# Patient Record
Sex: FEMALE | Race: WHITE | Employment: OTHER | URBAN - METROPOLITAN AREA
[De-identification: names, ages, dates, MRNs, and addresses within clinical notes are randomized per-mention and may not be internally consistent; named-entity substitution may affect disease eponyms.]

---

## 2020-05-25 ENCOUNTER — APPOINTMENT (OUTPATIENT)
Dept: CT IMAGING | Age: 85
DRG: 481 | End: 2020-05-25
Payer: MEDICARE

## 2020-05-25 ENCOUNTER — APPOINTMENT (OUTPATIENT)
Dept: GENERAL RADIOLOGY | Age: 85
DRG: 481 | End: 2020-05-25
Payer: MEDICARE

## 2020-05-25 ENCOUNTER — HOSPITAL ENCOUNTER (INPATIENT)
Age: 85
LOS: 4 days | Discharge: NURSING, REHAB OR LONG-TERM CARE | DRG: 481 | End: 2020-05-29
Attending: ORTHOPAEDIC SURGERY | Admitting: ORTHOPAEDIC SURGERY
Payer: MEDICARE

## 2020-05-25 PROBLEM — S72.142A CLOSED INTERTROCHANTERIC FRACTURE OF HIP, LEFT, INITIAL ENCOUNTER (HCC): Status: ACTIVE | Noted: 2020-05-25

## 2020-05-25 LAB
ABO/RH: NORMAL
ALBUMIN SERPL-MCNC: 3.5 G/DL (ref 3.5–4.6)
ALP BLD-CCNC: 65 U/L (ref 40–130)
ALT SERPL-CCNC: 11 U/L (ref 0–33)
ANION GAP SERPL CALCULATED.3IONS-SCNC: 9 MEQ/L (ref 9–15)
ANTIBODY IDENTIFICATION: NORMAL
ANTIBODY SCREEN: NORMAL
APTT: 49.4 SEC (ref 24.4–36.8)
AST SERPL-CCNC: 22 U/L (ref 0–35)
BASOPHILS ABSOLUTE: 0.1 K/UL (ref 0–0.2)
BASOPHILS RELATIVE PERCENT: 1 %
BILIRUB SERPL-MCNC: 0.4 MG/DL (ref 0.2–0.7)
BUN BLDV-MCNC: 15 MG/DL (ref 8–23)
CALCIUM SERPL-MCNC: 8.7 MG/DL (ref 8.5–9.9)
CHLORIDE BLD-SCNC: 90 MEQ/L (ref 95–107)
CO2: 29 MEQ/L (ref 20–31)
CREAT SERPL-MCNC: 0.79 MG/DL (ref 0.5–0.9)
DAT IGG: NORMAL
DAT POLYSPECIFIC: NORMAL
EOSINOPHILS ABSOLUTE: 0.2 K/UL (ref 0–0.7)
EOSINOPHILS RELATIVE PERCENT: 1.9 %
GFR AFRICAN AMERICAN: >60
GFR NON-AFRICAN AMERICAN: >60
GLOBULIN: 2.5 G/DL (ref 2.3–3.5)
GLUCOSE BLD-MCNC: 126 MG/DL (ref 70–99)
HCT VFR BLD CALC: 32.9 % (ref 37–47)
HEMOGLOBIN: 11.3 G/DL (ref 12–16)
INR BLD: 3.1
LYMPHOCYTES ABSOLUTE: 1.1 K/UL (ref 1–4.8)
LYMPHOCYTES RELATIVE PERCENT: 11.9 %
MCH RBC QN AUTO: 27 PG (ref 27–31.3)
MCHC RBC AUTO-ENTMCNC: 34.3 % (ref 33–37)
MCV RBC AUTO: 78.8 FL (ref 82–100)
MONOCYTES ABSOLUTE: 0.8 K/UL (ref 0.2–0.8)
MONOCYTES RELATIVE PERCENT: 9.4 %
NEUTROPHILS ABSOLUTE: 6.7 K/UL (ref 1.4–6.5)
NEUTROPHILS RELATIVE PERCENT: 75.8 %
PDW BLD-RTO: 14.8 % (ref 11.5–14.5)
PLATELET # BLD: 162 K/UL (ref 130–400)
POTASSIUM SERPL-SCNC: 3.9 MEQ/L (ref 3.4–4.9)
PROTHROMBIN TIME: 31.5 SEC (ref 12.3–14.9)
RBC # BLD: 4.17 M/UL (ref 4.2–5.4)
SODIUM BLD-SCNC: 128 MEQ/L (ref 135–144)
TOTAL PROTEIN: 6 G/DL (ref 6.3–8)
WBC # BLD: 8.8 K/UL (ref 4.8–10.8)

## 2020-05-25 PROCEDURE — 6370000000 HC RX 637 (ALT 250 FOR IP): Performed by: PHYSICIAN ASSISTANT

## 2020-05-25 PROCEDURE — 85730 THROMBOPLASTIN TIME PARTIAL: CPT

## 2020-05-25 PROCEDURE — 96376 TX/PRO/DX INJ SAME DRUG ADON: CPT

## 2020-05-25 PROCEDURE — 6360000002 HC RX W HCPCS: Performed by: PHYSICIAN ASSISTANT

## 2020-05-25 PROCEDURE — 6370000000 HC RX 637 (ALT 250 FOR IP): Performed by: ORTHOPAEDIC SURGERY

## 2020-05-25 PROCEDURE — 96374 THER/PROPH/DIAG INJ IV PUSH: CPT

## 2020-05-25 PROCEDURE — P9016 RBC LEUKOCYTES REDUCED: HCPCS

## 2020-05-25 PROCEDURE — 2580000003 HC RX 258: Performed by: ORTHOPAEDIC SURGERY

## 2020-05-25 PROCEDURE — 99222 1ST HOSP IP/OBS MODERATE 55: CPT | Performed by: ORTHOPAEDIC SURGERY

## 2020-05-25 PROCEDURE — 6830039000 HC L3 TRAUMA ALERT

## 2020-05-25 PROCEDURE — 71045 X-RAY EXAM CHEST 1 VIEW: CPT

## 2020-05-25 PROCEDURE — 81001 URINALYSIS AUTO W/SCOPE: CPT

## 2020-05-25 PROCEDURE — 99285 EMERGENCY DEPT VISIT HI MDM: CPT

## 2020-05-25 PROCEDURE — 86901 BLOOD TYPING SEROLOGIC RH(D): CPT

## 2020-05-25 PROCEDURE — 2580000003 HC RX 258: Performed by: PHYSICIAN ASSISTANT

## 2020-05-25 PROCEDURE — 70450 CT HEAD/BRAIN W/O DYE: CPT

## 2020-05-25 PROCEDURE — 96375 TX/PRO/DX INJ NEW DRUG ADDON: CPT

## 2020-05-25 PROCEDURE — P9059 PLASMA, FRZ BETWEEN 8-24HOUR: HCPCS

## 2020-05-25 PROCEDURE — 80053 COMPREHEN METABOLIC PANEL: CPT

## 2020-05-25 PROCEDURE — 1210000000 HC MED SURG R&B

## 2020-05-25 PROCEDURE — 86850 RBC ANTIBODY SCREEN: CPT

## 2020-05-25 PROCEDURE — 93005 ELECTROCARDIOGRAM TRACING: CPT | Performed by: PHYSICIAN ASSISTANT

## 2020-05-25 PROCEDURE — 86880 COOMBS TEST DIRECT: CPT

## 2020-05-25 PROCEDURE — 85610 PROTHROMBIN TIME: CPT

## 2020-05-25 PROCEDURE — 36430 TRANSFUSION BLD/BLD COMPNT: CPT

## 2020-05-25 PROCEDURE — 86870 RBC ANTIBODY IDENTIFICATION: CPT

## 2020-05-25 PROCEDURE — 72125 CT NECK SPINE W/O DYE: CPT

## 2020-05-25 PROCEDURE — 86900 BLOOD TYPING SEROLOGIC ABO: CPT

## 2020-05-25 PROCEDURE — 36415 COLL VENOUS BLD VENIPUNCTURE: CPT

## 2020-05-25 PROCEDURE — 85025 COMPLETE CBC W/AUTO DIFF WBC: CPT

## 2020-05-25 PROCEDURE — 86922 COMPATIBILITY TEST ANTIGLOB: CPT

## 2020-05-25 PROCEDURE — 73552 X-RAY EXAM OF FEMUR 2/>: CPT

## 2020-05-25 PROCEDURE — 73502 X-RAY EXAM HIP UNI 2-3 VIEWS: CPT

## 2020-05-25 RX ORDER — ONDANSETRON 2 MG/ML
4 INJECTION INTRAMUSCULAR; INTRAVENOUS EVERY 6 HOURS PRN
Status: DISCONTINUED | OUTPATIENT
Start: 2020-05-25 | End: 2020-05-29 | Stop reason: HOSPADM

## 2020-05-25 RX ORDER — OXYCODONE HYDROCHLORIDE 5 MG/1
2.5 TABLET ORAL EVERY 4 HOURS PRN
Status: DISCONTINUED | OUTPATIENT
Start: 2020-05-25 | End: 2020-05-26

## 2020-05-25 RX ORDER — OXYCODONE HYDROCHLORIDE 5 MG/1
5 TABLET ORAL EVERY 4 HOURS PRN
Status: DISCONTINUED | OUTPATIENT
Start: 2020-05-25 | End: 2020-05-25

## 2020-05-25 RX ORDER — FENTANYL CITRATE 50 UG/ML
25 INJECTION, SOLUTION INTRAMUSCULAR; INTRAVENOUS ONCE
Status: COMPLETED | OUTPATIENT
Start: 2020-05-25 | End: 2020-05-25

## 2020-05-25 RX ORDER — ACETAMINOPHEN 500 MG
1000 TABLET ORAL ONCE
Status: COMPLETED | OUTPATIENT
Start: 2020-05-25 | End: 2020-05-25

## 2020-05-25 RX ORDER — SODIUM CHLORIDE 0.9 % (FLUSH) 0.9 %
10 SYRINGE (ML) INJECTION EVERY 12 HOURS SCHEDULED
Status: DISCONTINUED | OUTPATIENT
Start: 2020-05-25 | End: 2020-05-27 | Stop reason: HOSPADM

## 2020-05-25 RX ORDER — MORPHINE SULFATE 2 MG/ML
2 INJECTION, SOLUTION INTRAMUSCULAR; INTRAVENOUS
Status: DISCONTINUED | OUTPATIENT
Start: 2020-05-25 | End: 2020-05-26

## 2020-05-25 RX ORDER — PROMETHAZINE HYDROCHLORIDE 12.5 MG/1
12.5 TABLET ORAL EVERY 6 HOURS PRN
Status: DISCONTINUED | OUTPATIENT
Start: 2020-05-25 | End: 2020-05-29 | Stop reason: HOSPADM

## 2020-05-25 RX ORDER — PHYTONADIONE 5 MG/1
10 TABLET ORAL ONCE
Status: COMPLETED | OUTPATIENT
Start: 2020-05-25 | End: 2020-05-25

## 2020-05-25 RX ORDER — FENTANYL CITRATE 50 UG/ML
12.5 INJECTION, SOLUTION INTRAMUSCULAR; INTRAVENOUS ONCE
Status: COMPLETED | OUTPATIENT
Start: 2020-05-25 | End: 2020-05-25

## 2020-05-25 RX ORDER — ONDANSETRON 2 MG/ML
4 INJECTION INTRAMUSCULAR; INTRAVENOUS ONCE
Status: COMPLETED | OUTPATIENT
Start: 2020-05-25 | End: 2020-05-25

## 2020-05-25 RX ORDER — SODIUM CHLORIDE, SODIUM LACTATE, POTASSIUM CHLORIDE, CALCIUM CHLORIDE 600; 310; 30; 20 MG/100ML; MG/100ML; MG/100ML; MG/100ML
INJECTION, SOLUTION INTRAVENOUS CONTINUOUS
Status: DISPENSED | OUTPATIENT
Start: 2020-05-25 | End: 2020-05-26

## 2020-05-25 RX ORDER — SODIUM CHLORIDE 9 MG/ML
INJECTION, SOLUTION INTRAVENOUS CONTINUOUS
Status: DISCONTINUED | OUTPATIENT
Start: 2020-05-25 | End: 2020-05-25

## 2020-05-25 RX ORDER — OXYCODONE HYDROCHLORIDE 5 MG/1
5 TABLET ORAL EVERY 4 HOURS PRN
Status: DISCONTINUED | OUTPATIENT
Start: 2020-05-25 | End: 2020-05-26

## 2020-05-25 RX ORDER — CEFAZOLIN SODIUM 2 G/50ML
2 SOLUTION INTRAVENOUS
Status: DISCONTINUED | OUTPATIENT
Start: 2020-05-25 | End: 2020-05-26

## 2020-05-25 RX ORDER — SODIUM CHLORIDE 0.9 % (FLUSH) 0.9 %
10 SYRINGE (ML) INJECTION PRN
Status: DISCONTINUED | OUTPATIENT
Start: 2020-05-25 | End: 2020-05-27 | Stop reason: HOSPADM

## 2020-05-25 RX ADMIN — SODIUM CHLORIDE: 9 INJECTION, SOLUTION INTRAVENOUS at 16:10

## 2020-05-25 RX ADMIN — FENTANYL CITRATE 12.5 MCG: 50 INJECTION, SOLUTION INTRAMUSCULAR; INTRAVENOUS at 17:51

## 2020-05-25 RX ADMIN — Medication 10 ML: at 22:23

## 2020-05-25 RX ADMIN — SODIUM CHLORIDE, POTASSIUM CHLORIDE, SODIUM LACTATE AND CALCIUM CHLORIDE: 600; 310; 30; 20 INJECTION, SOLUTION INTRAVENOUS at 22:22

## 2020-05-25 RX ADMIN — ONDANSETRON 4 MG: 2 INJECTION INTRAMUSCULAR; INTRAVENOUS at 16:11

## 2020-05-25 RX ADMIN — PHYTONADIONE 10 MG: 5 TABLET ORAL at 17:51

## 2020-05-25 RX ADMIN — FENTANYL CITRATE 25 MCG: 50 INJECTION, SOLUTION INTRAMUSCULAR; INTRAVENOUS at 16:10

## 2020-05-25 RX ADMIN — ACETAMINOPHEN 1000 MG: 500 TABLET ORAL at 22:15

## 2020-05-25 ASSESSMENT — ENCOUNTER SYMPTOMS
TROUBLE SWALLOWING: 0
COLOR CHANGE: 0
ABDOMINAL PAIN: 0
APNEA: 0
ALLERGIC/IMMUNOLOGIC NEGATIVE: 1
EYE PAIN: 0
SHORTNESS OF BREATH: 0

## 2020-05-25 ASSESSMENT — PAIN DESCRIPTION - FREQUENCY: FREQUENCY: CONTINUOUS

## 2020-05-25 ASSESSMENT — PAIN SCALES - GENERAL
PAINLEVEL_OUTOF10: 6
PAINLEVEL_OUTOF10: 8
PAINLEVEL_OUTOF10: 8
PAINLEVEL_OUTOF10: 0

## 2020-05-25 ASSESSMENT — PAIN DESCRIPTION - ORIENTATION: ORIENTATION: LEFT

## 2020-05-25 ASSESSMENT — PAIN DESCRIPTION - LOCATION: LOCATION: HIP

## 2020-05-25 ASSESSMENT — PAIN - FUNCTIONAL ASSESSMENT: PAIN_FUNCTIONAL_ASSESSMENT: PREVENTS OR INTERFERES SOME ACTIVE ACTIVITIES AND ADLS

## 2020-05-25 ASSESSMENT — PAIN DESCRIPTION - PAIN TYPE: TYPE: ACUTE PAIN

## 2020-05-25 ASSESSMENT — PAIN DESCRIPTION - ONSET: ONSET: SUDDEN

## 2020-05-25 NOTE — FLOWSHEET NOTE
1850-Patient arrived to floor, no pain at this time unless she moves. Patient is AOx4. Patients cardiologist is Dr. Yamini Gonsalez.

## 2020-05-25 NOTE — H&P
Patient: Heidi Bautista  Unit/Bed:W291/W291-01  YOB: 1935  MRN: [id number]  Acct: [de-identified]   Admitting Diagnosis: Closed intertrochanteric fracture of hip, left, initial encounter [facility]) Aron Bright  Admit Date:  5/25/2020  Hospital Day: 0      Chief Complaint:     Left hip pain    History of Present Illness:    Patient s/p fall, and presented to ED per EMS. She apparently fell over her nebulizer tubing. She apparently hit the back of her head, but did not have LOC. She also denies headache, neck or head pain. Patient of significance is on coumadin.     Allergies   Allergen Reactions    Lipitor [Atorvastatin Calcium]      Leg cramping       Current Facility-Administered Medications   Medication Dose Route Frequency Provider Last Rate Last Dose    0.9 % sodium chloride infusion   Intravenous Continuous Nigel Perez PA-C 100 mL/hr at 05/25/20 1610      lactated ringers infusion   Intravenous Continuous Fernando Roy MD        sodium chloride flush 0.9 % injection 10 mL  10 mL Intravenous 2 times per day Mica Wood MD        sodium chloride flush 0.9 % injection 10 mL  10 mL Intravenous PRN Fernando Sanchez MD        ceFAZolin (ANCEF) 2 g in dextrose 3 % 50 mL IVPB (duplex)  2 g Intravenous On Call to 29 Rea Ozuna MD        acetaminophen (TYLENOL) tablet 1,000 mg  1,000 mg Oral Once Mica Wood MD        oxyCODONE (ROXICODONE) immediate release tablet 5 mg  5 mg Oral Q4H PRN Fernando Sanchez MD        morphine (PF) injection 2 mg  2 mg Intravenous Q2H PRN Mica Wood MD        magnesium hydroxide (MILK OF MAGNESIA) 400 MG/5ML suspension 30 mL  30 mL Oral Daily PRN Mica Wood MD        promethazine (PHENERGAN) tablet 12.5 mg  12.5 mg Oral Q6H PRN Fernando Roy MD        Or    ondansetron (ZOFRAN) injection 4 mg  4 mg Intravenous Q6H PRN Mica Wood MD           PMHx:  Past Medical History:   Diagnosis Date  COPD (chronic obstructive pulmonary disease) (HCC)     Hypertension     Lupus (HCC)        PSHx:  Past Surgical History:   Procedure Laterality Date    APPENDECTOMY      CARDIAC PACEMAKER PLACEMENT      CHOLECYSTECTOMY      ELBOW SURGERY Left        Social Hx:  Social History     Socioeconomic History    Marital status:      Spouse name: None    Number of children: None    Years of education: None    Highest education level: None   Occupational History    None   Social Needs    Financial resource strain: None    Food insecurity     Worry: None     Inability: None    Transportation needs     Medical: None     Non-medical: None   Tobacco Use    Smoking status: Former Smoker     Types: Cigarettes    Smokeless tobacco: Never Used   Substance and Sexual Activity    Alcohol use: Not Currently    Drug use: Never    Sexual activity: None   Lifestyle    Physical activity     Days per week: None     Minutes per session: None    Stress: None   Relationships    Social connections     Talks on phone: None     Gets together: None     Attends [employer] service: None     Active member of club or organization: None     Attends meetings of clubs or organizations: None     Relationship status: None    Intimate partner violence     Fear of current or ex partner: None     Emotionally abused: None     Physically abused: None     Forced sexual activity: None   Other Topics Concern    None   Social History Narrative    None       Family Hx:  History reviewed. No pertinent family history. Review ofSystems:   Review of Systems       Physical Examination:    /61   Pulse 67   Temp 98 °F (36.7 °C) (Oral)   Resp 18   Ht 5' 5\" (1.651 m)   Wt 160 lb (72.6 kg)   LMP  (LMP Unknown)   SpO2 95%   BMI 26.63 kg/m²    Physical Exam     Examination demonstrates shortened left lower extremity. A little external rotation.   She has no skin problems no ecchymosis no significant bruising no asymmetrical LMP  (LMP Unknown)   SpO2 100%   BMI 26.63 kg/m²   CONSTITUTIONAL: Alert, oriented x3, no acute distress  EYES: Extraocular movements intact, no nystagmus  NECK: No jugular venous distention, no cervical lymphadenopathy  BACK: Nontender to palpation along lumbar spinous processes. LUNGS: Diffuse crackles, no wheezing to auscultation. Respiratory effort symmetrical.  CARDIOVASCULAR: Normal S1 and S2, irregularly irregular rate and rhythm  ABDOMEN: Abdomen soft, nontender, nondistended with normal bowel sounds  MUSCULOSKELETAL:    Bilateral upper extremities and right lower extremity with no palpable step-off or deformity. Left lower extremity with no gross shortening. No visible contusion. Left thigh swollen but supple. Left EHL, plantar flexion, dorsiflexion, knee flexion, knee extension are 4+/5. Wiggles toes. No spasticity or clonus. DP, femoral, popliteal pulses 2+ with capillary refill less than 2 seconds. NEUROLOGIC: Sensory intact light touch in deep peroneal, superficial peroneal, saphenous, tibial, sural nerve distributions. Patellar tendon and ankle reflexes 1+ and symmetrical.  SKIN: No visible contusion or rash.     DATA:  CBC:   Lab Results   Component Value Date    WBC 8.8 05/25/2020    RBC 4.17 05/25/2020    RBC 3.78 11/07/2011    HGB 11.3 05/25/2020    HCT 32.9 05/25/2020    MCV 78.8 05/25/2020    MCH 27.0 05/25/2020    MCHC 34.3 05/25/2020    RDW 14.8 05/25/2020     05/25/2020    MPV 9.0 05/05/2013     BMP:    Lab Results   Component Value Date     05/25/2020    K 3.9 05/25/2020    CL 90 05/25/2020    CO2 29 05/25/2020    BUN 15 05/25/2020    LABALBU 3.5 05/25/2020    LABALBU 3.6 11/06/2011    CREATININE 0.79 05/25/2020    CALCIUM 8.7 05/25/2020    GFRAA >60.0 05/25/2020    LABGLOM >60.0 05/25/2020    GLUCOSE 126 05/25/2020    GLUCOSE 105 11/07/2011     INR 1.5 this morning    Radiographs of the pelvis and left femur with unstable intertrochanteric fracture,  dissociation of posterior medial buttress. Left hip joint space concentric. ASSESSMENT AND PLAN:    -Acute left intertrochanteric femur fracture: Findings and options discussed with the patient. At this point demonstrates some element of fluid overload as well as atrial fibrillation. INR is 1.5 after reversal.  Awaiting cardiology input and optimization. Recommendation at this point is to defer surgery until clearance and optimization of been obtained. Discussed the risk, benefits, alternatives, convalescence for indirect versus open reduction and intramedullary nail fixation of left intertrochanteric femur fracture. Risk discussed included, but were not limited to, infection, DVT, pulmonary embolism, hematoma formation, wound healing complications, intraoperative and postoperative fracture, malunion or nonunion at the fracture site, need for further surgery, hardware related complications including trochanteric bursitis and cut out through the femoral head, acute and/or chronic postoperative pain, and death. Patient verbalized understanding and agrees to proceed. Plan for surgery tomorrow morning by my colleague, Dr. Adithya Rubalcava, to allow for appropriate medical optimization.     Jose Dawn MD  Orthopaedic Surgery

## 2020-05-25 NOTE — CARE COORDINATION
[facility] AT [place] Case Management Initial Discharge Assessment    Met with Patient to discuss discharge plan. PCP: Ronel Roach, DO                                Date of Last Visit: 3/23/20    If no PCP, list provided? N/A she states that her Dr retired and she will be getting a new Dr.    Sharon Sue: independently at home    Who do you live with? Daughter, son in law, grandson    Who helps you with your care:  self    If lives at home:     Do you have any barriers navigating in your home? no    Patient can perform ADL? Yes    Current Services (outpatient and in home) :  None    Dialysis: No    Is transportation available to get to your appointments? Yes    DME Equipment:  no    Respiratory equipment: PRN/HS Oxygen 2.5 Liters    Respiratory provider:  yes - [address]:  yes - [facility]    Consult with Medication Assistance Program?  No        Does Patient Have a High-Risk for Readmission Diagnosis (CHF, PN, MI, COPD)? Yes h/o copd on prn home o2. Initial Discharge Plan? (Note: please see concurrent daily documentation for any updates after initial note). CM to assess for further d/c needs and referrals. Daughter Kary Brennan is very involved in pt's care.       Electronically signed by Jaydon Viveros on 5/25/2020 at 6:15 PM

## 2020-05-26 ENCOUNTER — APPOINTMENT (OUTPATIENT)
Dept: GENERAL RADIOLOGY | Age: 85
DRG: 481 | End: 2020-05-26
Payer: MEDICARE

## 2020-05-26 ENCOUNTER — ANESTHESIA EVENT (OUTPATIENT)
Dept: OPERATING ROOM | Age: 85
DRG: 481 | End: 2020-05-26
Payer: MEDICARE

## 2020-05-26 LAB
ANION GAP SERPL CALCULATED.3IONS-SCNC: 8 MEQ/L (ref 9–15)
BACTERIA: NEGATIVE /HPF
BILIRUBIN URINE: NEGATIVE
BLOOD BANK DISPENSE STATUS: NORMAL
BLOOD BANK DISPENSE STATUS: NORMAL
BLOOD BANK PRODUCT CODE: NORMAL
BLOOD BANK PRODUCT CODE: NORMAL
BLOOD, URINE: NEGATIVE
BPU ID: NORMAL
BPU ID: NORMAL
BUN BLDV-MCNC: 10 MG/DL (ref 8–23)
CALCIUM SERPL-MCNC: 8.9 MG/DL (ref 8.5–9.9)
CHLORIDE BLD-SCNC: 92 MEQ/L (ref 95–107)
CLARITY: CLEAR
CO2: 30 MEQ/L (ref 20–31)
COLOR: YELLOW
CREAT SERPL-MCNC: 0.59 MG/DL (ref 0.5–0.9)
DESCRIPTION BLOOD BANK: NORMAL
DESCRIPTION BLOOD BANK: NORMAL
EKG ATRIAL RATE: 66 BPM
EKG ATRIAL RATE: 67 BPM
EKG P AXIS: 76 DEGREES
EKG P AXIS: 79 DEGREES
EKG P-R INTERVAL: 170 MS
EKG P-R INTERVAL: 188 MS
EKG Q-T INTERVAL: 464 MS
EKG Q-T INTERVAL: 508 MS
EKG QRS DURATION: 146 MS
EKG QRS DURATION: 176 MS
EKG QTC CALCULATION (BAZETT): 490 MS
EKG QTC CALCULATION (BAZETT): 532 MS
EKG R AXIS: 77 DEGREES
EKG R AXIS: 80 DEGREES
EKG T AXIS: -64 DEGREES
EKG T AXIS: -74 DEGREES
EKG VENTRICULAR RATE: 66 BPM
EKG VENTRICULAR RATE: 67 BPM
EPITHELIAL CELLS, UA: ABNORMAL /HPF (ref 0–5)
FERRITIN: 202.8 NG/ML (ref 13–150)
GFR AFRICAN AMERICAN: >60
GFR NON-AFRICAN AMERICAN: >60
GLUCOSE BLD-MCNC: 103 MG/DL (ref 70–99)
GLUCOSE URINE: NEGATIVE MG/DL
HYALINE CASTS: ABNORMAL /HPF (ref 0–5)
INR BLD: 1.5
IRON SATURATION: 14 % (ref 11–46)
IRON: 33 UG/DL (ref 37–145)
KETONES, URINE: NEGATIVE MG/DL
LEUKOCYTE ESTERASE, URINE: ABNORMAL
LV EF: 58 %
LVEF MODALITY: NORMAL
NITRITE, URINE: NEGATIVE
PH UA: 7 (ref 5–9)
POTASSIUM SERPL-SCNC: 4.1 MEQ/L (ref 3.4–4.9)
PROTEIN UA: NEGATIVE MG/DL
PROTHROMBIN TIME: 18.3 SEC (ref 12.3–14.9)
RBC UA: ABNORMAL /HPF (ref 0–5)
SARS-COV-2, NAAT: NOT DETECTED
SODIUM BLD-SCNC: 130 MEQ/L (ref 135–144)
SPECIFIC GRAVITY UA: 1.01 (ref 1–1.03)
TOTAL IRON BINDING CAPACITY: 236 UG/DL (ref 178–450)
URINE REFLEX TO CULTURE: ABNORMAL
UROBILINOGEN, URINE: 1 E.U./DL
WBC UA: ABNORMAL /HPF (ref 0–5)

## 2020-05-26 PROCEDURE — 2580000003 HC RX 258: Performed by: ORTHOPAEDIC SURGERY

## 2020-05-26 PROCEDURE — 85610 PROTHROMBIN TIME: CPT

## 2020-05-26 PROCEDURE — 83540 ASSAY OF IRON: CPT

## 2020-05-26 PROCEDURE — 6370000000 HC RX 637 (ALT 250 FOR IP): Performed by: NURSE PRACTITIONER

## 2020-05-26 PROCEDURE — 93010 ELECTROCARDIOGRAM REPORT: CPT | Performed by: INTERNAL MEDICINE

## 2020-05-26 PROCEDURE — 94761 N-INVAS EAR/PLS OXIMETRY MLT: CPT

## 2020-05-26 PROCEDURE — U0002 COVID-19 LAB TEST NON-CDC: HCPCS

## 2020-05-26 PROCEDURE — 6370000000 HC RX 637 (ALT 250 FOR IP): Performed by: ORTHOPAEDIC SURGERY

## 2020-05-26 PROCEDURE — 71045 X-RAY EXAM CHEST 1 VIEW: CPT

## 2020-05-26 PROCEDURE — 99223 1ST HOSP IP/OBS HIGH 75: CPT | Performed by: INTERNAL MEDICINE

## 2020-05-26 PROCEDURE — 94640 AIRWAY INHALATION TREATMENT: CPT

## 2020-05-26 PROCEDURE — 82728 ASSAY OF FERRITIN: CPT

## 2020-05-26 PROCEDURE — 93306 TTE W/DOPPLER COMPLETE: CPT

## 2020-05-26 PROCEDURE — 83550 IRON BINDING TEST: CPT

## 2020-05-26 PROCEDURE — 80048 BASIC METABOLIC PNL TOTAL CA: CPT

## 2020-05-26 PROCEDURE — 1210000000 HC MED SURG R&B

## 2020-05-26 PROCEDURE — 6370000000 HC RX 637 (ALT 250 FOR IP): Performed by: INTERNAL MEDICINE

## 2020-05-26 PROCEDURE — 36415 COLL VENOUS BLD VENIPUNCTURE: CPT

## 2020-05-26 RX ORDER — LISINOPRIL 20 MG/1
20 TABLET ORAL NIGHTLY
Status: DISCONTINUED | OUTPATIENT
Start: 2020-05-26 | End: 2020-05-29 | Stop reason: HOSPADM

## 2020-05-26 RX ORDER — MORPHINE SULFATE 2 MG/ML
1 INJECTION, SOLUTION INTRAMUSCULAR; INTRAVENOUS
Status: DISCONTINUED | OUTPATIENT
Start: 2020-05-26 | End: 2020-05-29 | Stop reason: HOSPADM

## 2020-05-26 RX ORDER — WARFARIN SODIUM 4 MG/1
4 TABLET ORAL EVERY OTHER DAY
Status: ON HOLD | COMMUNITY
End: 2020-06-10 | Stop reason: HOSPADM

## 2020-05-26 RX ORDER — MONTELUKAST SODIUM 10 MG/1
10 TABLET ORAL NIGHTLY
Status: DISCONTINUED | OUTPATIENT
Start: 2020-05-26 | End: 2020-05-29 | Stop reason: HOSPADM

## 2020-05-26 RX ORDER — IPRATROPIUM BROMIDE AND ALBUTEROL SULFATE 2.5; .5 MG/3ML; MG/3ML
1 SOLUTION RESPIRATORY (INHALATION) 4 TIMES DAILY PRN
Status: DISCONTINUED | OUTPATIENT
Start: 2020-05-26 | End: 2020-05-26

## 2020-05-26 RX ORDER — POTASSIUM CHLORIDE 20 MEQ/1
20 TABLET, EXTENDED RELEASE ORAL 2 TIMES DAILY
COMMUNITY

## 2020-05-26 RX ORDER — MAGNESIUM OXIDE 400 MG/1
400 TABLET ORAL DAILY
Status: DISCONTINUED | OUTPATIENT
Start: 2020-05-27 | End: 2020-05-26 | Stop reason: RX

## 2020-05-26 RX ORDER — HYDROCODONE BITARTRATE AND ACETAMINOPHEN 5; 325 MG/1; MG/1
0.5 TABLET ORAL EVERY 4 HOURS PRN
Status: DISCONTINUED | OUTPATIENT
Start: 2020-05-26 | End: 2020-05-29 | Stop reason: HOSPADM

## 2020-05-26 RX ORDER — MAGNESIUM OXIDE 400 MG/1
400 TABLET ORAL DAILY
COMMUNITY

## 2020-05-26 RX ORDER — HYDROXYCHLOROQUINE SULFATE 200 MG/1
100 TABLET, FILM COATED ORAL 2 TIMES DAILY
COMMUNITY
End: 2021-03-18

## 2020-05-26 RX ORDER — WARFARIN SODIUM 3 MG/1
3 TABLET ORAL EVERY OTHER DAY
Status: ON HOLD | COMMUNITY
End: 2020-06-10 | Stop reason: HOSPADM

## 2020-05-26 RX ORDER — TRANEXAMIC ACID 650 1/1
1300 TABLET ORAL ONCE
Status: COMPLETED | OUTPATIENT
Start: 2020-05-27 | End: 2020-05-27

## 2020-05-26 RX ORDER — ASPIRIN 81 MG/1
81 TABLET, CHEWABLE ORAL DAILY
COMMUNITY

## 2020-05-26 RX ORDER — SOTALOL HYDROCHLORIDE 120 MG/1
120 TABLET ORAL 2 TIMES DAILY
Status: DISCONTINUED | OUTPATIENT
Start: 2020-05-26 | End: 2020-05-29 | Stop reason: HOSPADM

## 2020-05-26 RX ORDER — ACETAMINOPHEN 500 MG
1000 TABLET ORAL DAILY
Status: DISCONTINUED | OUTPATIENT
Start: 2020-05-27 | End: 2020-05-29 | Stop reason: HOSPADM

## 2020-05-26 RX ORDER — CEFAZOLIN SODIUM 1 G/50ML
2 INJECTION, SOLUTION INTRAVENOUS
Status: DISCONTINUED | OUTPATIENT
Start: 2020-05-26 | End: 2020-05-26 | Stop reason: CLARIF

## 2020-05-26 RX ORDER — SOTALOL HYDROCHLORIDE 120 MG/1
120 TABLET ORAL 2 TIMES DAILY
COMMUNITY

## 2020-05-26 RX ORDER — HYDROCHLOROTHIAZIDE 12.5 MG/1
12.5 CAPSULE, GELATIN COATED ORAL DAILY
COMMUNITY

## 2020-05-26 RX ORDER — CEFAZOLIN SODIUM 2 G/50ML
2 SOLUTION INTRAVENOUS
Status: DISPENSED | OUTPATIENT
Start: 2020-05-26 | End: 2020-05-26

## 2020-05-26 RX ORDER — AMOXICILLIN 250 MG
2 CAPSULE ORAL 2 TIMES DAILY
Status: DISCONTINUED | OUTPATIENT
Start: 2020-05-26 | End: 2020-05-29 | Stop reason: HOSPADM

## 2020-05-26 RX ORDER — PANTOPRAZOLE SODIUM 40 MG/1
40 TABLET, DELAYED RELEASE ORAL
Status: DISCONTINUED | OUTPATIENT
Start: 2020-05-27 | End: 2020-05-29 | Stop reason: HOSPADM

## 2020-05-26 RX ORDER — METHOCARBAMOL 500 MG/1
500 TABLET, FILM COATED ORAL 4 TIMES DAILY PRN
Status: DISCONTINUED | OUTPATIENT
Start: 2020-05-26 | End: 2020-05-29 | Stop reason: HOSPADM

## 2020-05-26 RX ORDER — HYDROXYCHLOROQUINE SULFATE 200 MG/1
100 TABLET, FILM COATED ORAL 2 TIMES DAILY
Status: DISCONTINUED | OUTPATIENT
Start: 2020-05-26 | End: 2020-05-29 | Stop reason: HOSPADM

## 2020-05-26 RX ORDER — LANOLIN ALCOHOL/MO/W.PET/CERES
400 CREAM (GRAM) TOPICAL DAILY
Status: DISCONTINUED | OUTPATIENT
Start: 2020-05-27 | End: 2020-05-29 | Stop reason: HOSPADM

## 2020-05-26 RX ORDER — ALBUTEROL SULFATE 2.5 MG/3ML
2.5 SOLUTION RESPIRATORY (INHALATION) EVERY 6 HOURS PRN
COMMUNITY

## 2020-05-26 RX ORDER — HYDROCODONE BITARTRATE AND ACETAMINOPHEN 10; 325 MG/1; MG/1
1 TABLET ORAL EVERY 4 HOURS PRN
Status: DISCONTINUED | OUTPATIENT
Start: 2020-05-26 | End: 2020-05-29 | Stop reason: HOSPADM

## 2020-05-26 RX ORDER — FUROSEMIDE 20 MG/1
40 TABLET ORAL DAILY
Status: ON HOLD | COMMUNITY
End: 2020-06-10 | Stop reason: HOSPADM

## 2020-05-26 RX ORDER — LISINOPRIL 20 MG/1
20 TABLET ORAL NIGHTLY
Status: ON HOLD | COMMUNITY
End: 2020-06-10 | Stop reason: HOSPADM

## 2020-05-26 RX ORDER — ACETAMINOPHEN 80 MG
TABLET,CHEWABLE ORAL ONCE
Status: COMPLETED | OUTPATIENT
Start: 2020-05-26 | End: 2020-05-26

## 2020-05-26 RX ORDER — HYDROCODONE BITARTRATE AND ACETAMINOPHEN 5; 325 MG/1; MG/1
1 TABLET ORAL EVERY 4 HOURS PRN
Status: DISCONTINUED | OUTPATIENT
Start: 2020-05-26 | End: 2020-05-29 | Stop reason: HOSPADM

## 2020-05-26 RX ADMIN — MONTELUKAST SODIUM 10 MG: 10 TABLET, FILM COATED ORAL at 20:25

## 2020-05-26 RX ADMIN — DOCUSATE SODIUM 50MG AND SENNOSIDES 8.6MG 2 TABLET: 8.6; 5 TABLET, FILM COATED ORAL at 20:25

## 2020-05-26 RX ADMIN — METHOCARBAMOL TABLETS 500 MG: 500 TABLET, COATED ORAL at 12:20

## 2020-05-26 RX ADMIN — Medication 10 ML: at 20:29

## 2020-05-26 RX ADMIN — SOTALOL HYDROCHLORIDE 120 MG: 120 TABLET ORAL at 20:24

## 2020-05-26 RX ADMIN — TIOTROPIUM BROMIDE INHALATION SPRAY 2 PUFF: 3.12 SPRAY, METERED RESPIRATORY (INHALATION) at 17:32

## 2020-05-26 RX ADMIN — HYDROXYCHLOROQUINE SULFATE 100 MG: 200 TABLET, FILM COATED ORAL at 15:35

## 2020-05-26 RX ADMIN — HYDROXYCHLOROQUINE SULFATE 100 MG: 200 TABLET, FILM COATED ORAL at 23:55

## 2020-05-26 RX ADMIN — LISINOPRIL 20 MG: 20 TABLET ORAL at 23:55

## 2020-05-26 RX ADMIN — IPRATROPIUM BROMIDE AND ALBUTEROL SULFATE 1 AMPULE: .5; 3 SOLUTION RESPIRATORY (INHALATION) at 09:58

## 2020-05-26 RX ADMIN — SOTALOL HYDROCHLORIDE 120 MG: 120 TABLET ORAL at 12:20

## 2020-05-26 RX ADMIN — Medication: at 12:20

## 2020-05-26 ASSESSMENT — ENCOUNTER SYMPTOMS
GASTROINTESTINAL NEGATIVE: 1
SHORTNESS OF BREATH: 1
COUGH: 0
CHEST TIGHTNESS: 0
EYES NEGATIVE: 1

## 2020-05-26 ASSESSMENT — PAIN SCALES - GENERAL: PAINLEVEL_OUTOF10: 0

## 2020-05-26 NOTE — CONSULTS
Hospitalist Group   History and Physical      CHIEF COMPLAINT: Fall    History of Present Illness:  81 y.o. female with a history of lupus, COPD on O2 as needed, GONZALEZ on CPAP at night, hypertension, A. fib on Coumadin status post pacemaker, presents to the ER from home after a fall. Patient was using her nebulizer for COPD when she got up she tripped on the tube and fell hitting the back of her head and her left hip. She denied losing consciousness. Patient was only complaining of left hip pain. Patient is active at baseline. She takes care of herself and she usually walks with no assistance. Patient is completely independent. She currently denies dizziness, chest pain, shortness of breath, nausea, vomiting, abdominal pain, urinary symptoms. She said that she has history of A. fib and has pacemaker for the past 20 years. She is on Coumadin and her INR is 3.1. She denies bleeding from anywhere. Patient uses oxygen when needed and usually sleeps with CPAP. She denies any sick exposure. REVIEW OF SYSTEMS:  no fevers, chills, cp, sob, n/v, ha, vision/hearing changes, wt changes, hot/cold flashes, other open skin lesions, diarrhea, constipation, dysuria/hematuria unless noted in HPI. Complete ROS performed with the patient and is otherwise negative. PMH:  Past Medical History:   Diagnosis Date    COPD [facility]chronic obstructive pulmonary disease) ([facility] [address]     Hypertension     Lupus ([facility] [address]        Surgical History:  Past Surgical History:   Procedure Laterality Date    APPENDECTOMY      CARDIAC PACEMAKER PLACEMENT      CHOLECYSTECTOMY      ELBOW SURGERY Left        Medications Prior to Admission:    Prior to Admission medications    Not on File       Allergies:    Lipitor [atorvastatin calcium]    Social History:    reports that she has quit smoking. Her smoking use included cigarettes. She has never used smokeless tobacco. She reports previous alcohol use.  She reports that she does not use drugs. Family History:   History reviewed. No pertinent family history. PHYSICAL EXAM:  Vitals:  /61   Pulse 67   Temp 98 °F (36.7 °C) (Oral)   Resp 18   Ht 5' 5\" (1.651 m)   Wt 160 lb (72.6 kg)   LMP  (LMP Unknown)   SpO2 95%   BMI 26.63 kg/m²   General Appearance: alert and oriented to person, place and time, well developed and well- nourished, in no acute distress  Skin: warm and dry, no rash or erythema  Head: normocephalic and atraumatic  Eyes: pupils equal, round, and reactive to light, extraocular eye movements intact, conjunctivae normal  ENT: tympanic membrane, external ear and ear canal normal bilaterally, nose without deformity, nasal mucosa and turbinates normal without polyps  Neck: supple and non-tender without mass, no thyromegaly or thyroid nodules, no cervical lymphadenopathy  Pulmonary/Chest: clear to auscultation bilaterally- no wheezes, rales or rhonchi, normal air movement, no respiratory distress  Cardiovascular: normal rate, regular rhythm, normal S1 and S2, no murmurs, rubs, clicks, or gallops, distal pulses intact, no carotid bruits  Abdomen: soft, non-tender, non-distended, normal bowel sounds, no masses or organomegaly  Extremities: no cyanosis, clubbing or edema  Musculoskeletal: Tenderness over the left hip, restricted range of motion of the left lower extremity, externally rotated left lower extremity  Neurologic: reflexes normal and symmetric, no cranial nerve deficit, coordination and speech normal      LABS:  Recent Labs     05/25/20  1615   *   K 3.9   CL 90*   CO2 29   BUN 15   CREATININE 0.79   GLUCOSE 126*   CALCIUM 8.7       Recent Labs     05/25/20  1615   WBC 8.8   RBC 4.17*   HGB 11.3*   HCT 32.9*   MCV 78.8*   MCH 27.0   MCHC 34.3   RDW 14.8*          No results for input(s): POCGLU in the last 72 hours.     CBC with Differential:    Lab Results   Component Value Date    WBC 8.8 05/25/2020    RBC 4.17 05/25/2020    RBC 3.78 11/07/2011    HGB 11.3 05/25/2020    HCT 32.9 05/25/2020     05/25/2020    MCV 78.8 05/25/2020    MCH 27.0 05/25/2020    MCHC 34.3 05/25/2020    RDW 14.8 05/25/2020    LYMPHOPCT 11.9 05/25/2020    MONOPCT 9.4 05/25/2020    EOSPCT 0.0 11/05/2011    BASOPCT 1.0 05/25/2020    MONOSABS 0.8 05/25/2020    LYMPHSABS 1.1 05/25/2020    EOSABS 0.2 05/25/2020    BASOSABS 0.1 05/25/2020     CMP:    Lab Results   Component Value Date     05/25/2020    K 3.9 05/25/2020    CL 90 05/25/2020    CO2 29 05/25/2020    BUN 15 05/25/2020    CREATININE 0.79 05/25/2020    GFRAA >60.0 05/25/2020    LABGLOM >60.0 05/25/2020    GLUCOSE 126 05/25/2020    GLUCOSE 105 11/07/2011    PROT 6.0 05/25/2020    LABALBU 3.5 05/25/2020    LABALBU 3.6 11/06/2011    CALCIUM 8.7 05/25/2020    BILITOT 0.4 05/25/2020    ALKPHOS 65 05/25/2020    AST 22 05/25/2020    ALT 11 05/25/2020       Radiology: Ct Head Wo Contrast    Result Date: 5/25/2020  EXAM: CT SCAN OF THE BRAIN WITHOUT CONTRAST COMPARISON: 5/3/2013 REASONS FOR EXAMINATION:     TRAUMA, HEAD INJURY IN FALL, PATIENT STRUCK HEAD IN FALL, HEADACHE TECHNIQUE:  CT brain is obtained without IV contrast agent. FINDINGS: An unenhanced CT scan of the brain demonstrates no evidence of a skull fracture. There is cerebral atrophy and age related findings in the brain. There is no acute CVA. There is no acute intra-axial or extra-axial findings in the brain. There is no hydrocephalus, intracranial mass, hemorrhage, \"mass effect\" or midline shift. The remainder of the CT scan of the brain appears unremarkable. 1. CEREBRAL ATROPHY AND AGE RELATED FINDINGS IN THE BRAIN. 2. NO ACUTE INTRA-AXIAL OR EXTRA-AXIAL FINDINGS IN THE BRAIN. All CT scans at this facility use dose modulation, iterative reconstruction, and/or weight based dosing when appropriate to reduce radiation dose to as low as reasonably achievable.      Ct Cervical Spine Wo Contrast    Result Date: 5/25/2020  EXAMINATION: CT CERVICAL SPINE WITHOUT surgery   Cardiology was also consulted for preoperative assessment   Patient has history of COPD on oxygen as needed, A. fib status post pacemaker, on anticoagulation with INR 3.1, on aspirin   Patient is active and fully independent   She does not have active chest pain, diabetes, CKD   She has low to moderate risk for surgery -recommend to continue telemetry after surgery   Follow-up cardiology assessment and recommendation before surgery   Hold Coumadin and aspirin   INR needs to be reversed before surgery  3. Hyponatremia   Sodium 128   Patient is on lisinopril and hydrochlorothiazide   Most likely medication induced   Start patient on hydralazine PRN and hold hydrochlorothiazide for now   Monitor sodium  4. A. Fib history    Pacemaker in place   Patient is on Coumadin with INR 3.1   Surgery for hip fracture scheduled for Wednesday    Will hold Coumadin and give a dose of vitamin K -if needed, patient can be transfused FFP's before surgery   Monitor INR daily   Follow-up cardiology recommendation  5. history of COPD   Patient currently not complaining shortness of breath   Will resume home medication   DuoNeb PRN  6. Hypertension    Given the mild hyponatremia-hydrochlorothiazide will be held   Will resume other home medication after verification  7. GONZALEZ   Resume CPAP    Code Status: Full  DVT prophylaxis:  NR 3.1      Electronically signed by Skye Torres MD on 5/25/2020 at 8:40 PM      NOTE: This report was transcribed using voice recognition software. Every effort was made to ensure accuracy; however, inadvertent computerized transcription errors may be present.

## 2020-05-26 NOTE — CONSULTS
[facility] Daily Consult Note  Name: Jeromy Parker  Age: 81 y.o. Gender: female  CodeStatus: Full Code  Allergies: Lipitor [Atorvastatin [facility]      Primary Cardiology:    Dr. Lilian Iniguez Cardiology:  Dr. Jessica Gay  Primary Care Provider: Jaqueline Green DO  InpatientTreatment Team: Treatment Team: Attending Provider: Althea Redmond MD; : Lion Miramontes. Anna, RN; Consulting Physician: Ricardo Navarrete MD; Patient Care Tech: Kandy Blend; Registered Nurse: Vijay Rust RN; Utilization Reviewer: Reyna Johnson, PENELOPE; Consulting Physician: Fozia Patel MD  Admission Date: 5/25/2020   Reason for consult: Preop assessment/atrial fibrillation/shortness of breath/  Time of consult 5/26/2020 0929 hours    I have personally performed a complete face to face history and physical on this patient. I have reviewed the notations as entered by VENESSA Langston I have personally  formulated the impression and plan on this patient and amended as necessary. Westley Gay MD [address] [facility] with physical as noted below. She is currently short of breath and would postpone surgery by 24 hours. 1.  Shortness of breath would by physical exam be most likely related to COPD exacerbation. She has missed several breathing treatments since in hospital.  Agree with echocardiogram to assess degree if any of significant pulmonary hypertension. Continue respiratory treatments as ordered. She also missed 1 dose of Lasix and would resume her Lasix as at home. 2.  Persistent atrial fibrillation: No clinical recurrence though she describes at home occasionally feeling chest tightness and irregular heart rates which is her symptom when A. fib recurs. It is fairly infrequent. Continue sotalol at current dose with QTC less than 550 which is reasonable given that she has continued paced rhythm. 3.  Hyponatremia: Likely related to hydrochlorothiazide. May be able to tolerate ACE plus loop diuretic.   Repeat sodium today to see Respiratory distress present. Comments: Fine crackles bilateral lower lobes Prolonged expiratory phase. Purse lipped breathing. No wheeze. Poor air exchange. Abdominal:      Palpations: Abdomen is soft. Tenderness: There is no abdominal tenderness. Musculoskeletal:         General: Tenderness and signs of injury present. Comments: Limited ROM L leg d/t fracture. Skin:     General: Skin is warm and dry. Neurological:      General: No focal deficit present. Mental Status: She is alert and oriented to person, place, and time. Psychiatric:         Mood and Affect: Mood normal.         Review of Systems   Constitutional: Negative for chills, fatigue and fever. HENT: Negative. Eyes: Negative. Respiratory: Positive for shortness of breath. Negative for cough and chest tightness. Cardiovascular: Negative for chest pain, palpitations and leg swelling. Gastrointestinal: Negative. Genitourinary: Negative. Musculoskeletal: Negative. Skin: Negative. Neurological: Negative for dizziness, syncope, light-headedness and numbness. Psychiatric/Behavioral: Negative.         Medications:  Reviewed    Infusion Medications:    lactated ringers 50 mL/hr at 05/25/20 2222     Scheduled Medications:    sodium chloride flush  10 mL Intravenous 2 times per day     PRN Meds: sodium chloride flush, morphine, magnesium hydroxide, promethazine **OR** ondansetron, oxyCODONE, oxyCODONE    Vitals  Vitals:    05/26/20 0507   BP: (!) 150/59   Pulse: 69   Resp: 16   Temp: 98 °F (36.7 °C)   SpO2: 100%           Labs:   Recent Labs     05/25/20  1615   WBC 8.8   HGB 11.3*   HCT 32.9*        Recent Labs     05/25/20  1615   *   K 3.9   CL 90*   CO2 29   BUN 15   CREATININE 0.79   CALCIUM 8.7     Recent Labs     05/25/20  1615   AST 22   ALT 11   BILITOT 0.4   ALKPHOS 65     Recent Labs     05/25/20  1615 05/26/20  0524   INR 3.1 1.5     No results for input(s): Best Patel in the

## 2020-05-26 NOTE — CONSULTS
infiltrate        Assessment, plan:   Patient is at risk due to    · Mechanical fall with left femoral fracture  · COPD, currently symptoms at baseline, no signs of exacerbation  · Chronic cough likely chronic bronchitis  ·  Arozulla score 23 , class III, 4.2% risk of respiratory failure  · [facility] 1.8%     Patient at low to moderate risk of pulmonary complications.     · Obstructive sleep apnea on sleep  · Chronic hypoxic respiratory failure on O2      Recommendation  · Start Spiriva  · Pulmonary hygiene  · O2 to keep sat 90 to 92%  · Continue CPAP while asleep  · DuoNebs every 6 hours as needed  · Early mobility  · Pain control       Thank you for consultation    Electronically signed by Sandra Smith MD, FCCP,  on 5/26/2020 at 10:50 AM

## 2020-05-27 ENCOUNTER — TELEPHONE (OUTPATIENT)
Dept: ORTHOPEDIC SURGERY | Age: 85
End: 2020-05-27

## 2020-05-27 ENCOUNTER — ANESTHESIA (OUTPATIENT)
Dept: OPERATING ROOM | Age: 85
DRG: 481 | End: 2020-05-27
Payer: MEDICARE

## 2020-05-27 ENCOUNTER — APPOINTMENT (OUTPATIENT)
Dept: GENERAL RADIOLOGY | Age: 85
DRG: 481 | End: 2020-05-27
Payer: MEDICARE

## 2020-05-27 VITALS — DIASTOLIC BLOOD PRESSURE: 45 MMHG | TEMPERATURE: 97 F | OXYGEN SATURATION: 100 % | SYSTOLIC BLOOD PRESSURE: 91 MMHG

## 2020-05-27 LAB
ALBUMIN SERPL-MCNC: 3.4 G/DL (ref 3.5–4.6)
ANION GAP SERPL CALCULATED.3IONS-SCNC: 8 MEQ/L (ref 9–15)
BUN BLDV-MCNC: 5 MG/DL (ref 8–23)
CALCIUM SERPL-MCNC: 9 MG/DL (ref 8.5–9.9)
CHLORIDE BLD-SCNC: 94 MEQ/L (ref 95–107)
CO2: 30 MEQ/L (ref 20–31)
CREAT SERPL-MCNC: 0.39 MG/DL (ref 0.5–0.9)
GFR AFRICAN AMERICAN: >60
GFR NON-AFRICAN AMERICAN: >60
GLUCOSE BLD-MCNC: 106 MG/DL (ref 70–99)
HCT VFR BLD CALC: 27.6 % (ref 37–47)
HEMOGLOBIN: 9.2 G/DL (ref 12–16)
INR BLD: 1.1
INR BLD: 1.1
MAGNESIUM: 2.1 MG/DL (ref 1.7–2.4)
MCH RBC QN AUTO: 26.9 PG (ref 27–31.3)
MCHC RBC AUTO-ENTMCNC: 33.4 % (ref 33–37)
MCV RBC AUTO: 80.3 FL (ref 82–100)
PDW BLD-RTO: 14.8 % (ref 11.5–14.5)
PHOSPHORUS: 2.5 MG/DL (ref 2.3–4.8)
PLATELET # BLD: 132 K/UL (ref 130–400)
POTASSIUM SERPL-SCNC: 3.8 MEQ/L (ref 3.4–4.9)
PROTHROMBIN TIME: 13.8 SEC (ref 12.3–14.9)
PROTHROMBIN TIME: 14 SEC (ref 12.3–14.9)
RBC # BLD: 3.43 M/UL (ref 4.2–5.4)
SODIUM BLD-SCNC: 132 MEQ/L (ref 135–144)
WBC # BLD: 8.1 K/UL (ref 4.8–10.8)

## 2020-05-27 PROCEDURE — 6360000002 HC RX W HCPCS: Performed by: ORTHOPAEDIC SURGERY

## 2020-05-27 PROCEDURE — 7100000001 HC PACU RECOVERY - ADDTL 15 MIN: Performed by: ORTHOPAEDIC SURGERY

## 2020-05-27 PROCEDURE — 6370000000 HC RX 637 (ALT 250 FOR IP): Performed by: ORTHOPAEDIC SURGERY

## 2020-05-27 PROCEDURE — 3600000004 HC SURGERY LEVEL 4 BASE: Performed by: ORTHOPAEDIC SURGERY

## 2020-05-27 PROCEDURE — 2580000003 HC RX 258: Performed by: ORTHOPAEDIC SURGERY

## 2020-05-27 PROCEDURE — 3209999900 FLUORO FOR SURGICAL PROCEDURES

## 2020-05-27 PROCEDURE — 2700000000 HC OXYGEN THERAPY PER DAY

## 2020-05-27 PROCEDURE — C1769 GUIDE WIRE: HCPCS | Performed by: ORTHOPAEDIC SURGERY

## 2020-05-27 PROCEDURE — 99232 SBSQ HOSP IP/OBS MODERATE 35: CPT | Performed by: INTERNAL MEDICINE

## 2020-05-27 PROCEDURE — C1713 ANCHOR/SCREW BN/BN,TIS/BN: HCPCS | Performed by: ORTHOPAEDIC SURGERY

## 2020-05-27 PROCEDURE — 6370000000 HC RX 637 (ALT 250 FOR IP): Performed by: NURSE PRACTITIONER

## 2020-05-27 PROCEDURE — 2580000003 HC RX 258: Performed by: NURSE ANESTHETIST, CERTIFIED REGISTERED

## 2020-05-27 PROCEDURE — 2720000010 HC SURG SUPPLY STERILE: Performed by: ORTHOPAEDIC SURGERY

## 2020-05-27 PROCEDURE — 85027 COMPLETE CBC AUTOMATED: CPT

## 2020-05-27 PROCEDURE — 94640 AIRWAY INHALATION TREATMENT: CPT

## 2020-05-27 PROCEDURE — 36415 COLL VENOUS BLD VENIPUNCTURE: CPT

## 2020-05-27 PROCEDURE — 6370000000 HC RX 637 (ALT 250 FOR IP): Performed by: INTERNAL MEDICINE

## 2020-05-27 PROCEDURE — 1210000000 HC MED SURG R&B

## 2020-05-27 PROCEDURE — 3600000014 HC SURGERY LEVEL 4 ADDTL 15MIN: Performed by: ORTHOPAEDIC SURGERY

## 2020-05-27 PROCEDURE — 94761 N-INVAS EAR/PLS OXIMETRY MLT: CPT

## 2020-05-27 PROCEDURE — 80069 RENAL FUNCTION PANEL: CPT

## 2020-05-27 PROCEDURE — 3700000000 HC ANESTHESIA ATTENDED CARE: Performed by: ORTHOPAEDIC SURGERY

## 2020-05-27 PROCEDURE — 27245 TREAT THIGH FRACTURE: CPT | Performed by: ORTHOPAEDIC SURGERY

## 2020-05-27 PROCEDURE — 2500000003 HC RX 250 WO HCPCS: Performed by: NURSE ANESTHETIST, CERTIFIED REGISTERED

## 2020-05-27 PROCEDURE — 83735 ASSAY OF MAGNESIUM: CPT

## 2020-05-27 PROCEDURE — 7100000000 HC PACU RECOVERY - FIRST 15 MIN: Performed by: ORTHOPAEDIC SURGERY

## 2020-05-27 PROCEDURE — 85610 PROTHROMBIN TIME: CPT

## 2020-05-27 PROCEDURE — 2709999900 HC NON-CHARGEABLE SUPPLY: Performed by: ORTHOPAEDIC SURGERY

## 2020-05-27 PROCEDURE — 3700000001 HC ADD 15 MINUTES (ANESTHESIA): Performed by: ORTHOPAEDIC SURGERY

## 2020-05-27 PROCEDURE — 6360000002 HC RX W HCPCS: Performed by: NURSE ANESTHETIST, CERTIFIED REGISTERED

## 2020-05-27 PROCEDURE — 0QSC06Z REPOSITION LEFT LOWER FEMUR WITH INTRAMEDULLARY INTERNAL FIXATION DEVICE, OPEN APPROACH: ICD-10-PCS | Performed by: ORTHOPAEDIC SURGERY

## 2020-05-27 DEVICE — SCREW BNE L36MM DIA5MM TIB LT GRN TI ST CANN LOK FULL THRD: Type: IMPLANTABLE DEVICE | Site: HIP | Status: FUNCTIONAL

## 2020-05-27 DEVICE — IMPLANTABLE DEVICE: Type: IMPLANTABLE DEVICE | Site: HIP | Status: FUNCTIONAL

## 2020-05-27 RX ORDER — POTASSIUM CHLORIDE 20 MEQ/1
20 TABLET, EXTENDED RELEASE ORAL 2 TIMES DAILY
Status: DISCONTINUED | OUTPATIENT
Start: 2020-05-27 | End: 2020-05-29 | Stop reason: HOSPADM

## 2020-05-27 RX ORDER — SODIUM CHLORIDE 0.9 % (FLUSH) 0.9 %
10 SYRINGE (ML) INJECTION EVERY 12 HOURS SCHEDULED
Status: DISCONTINUED | OUTPATIENT
Start: 2020-05-27 | End: 2020-05-29 | Stop reason: HOSPADM

## 2020-05-27 RX ORDER — PROPOFOL 10 MG/ML
INJECTION, EMULSION INTRAVENOUS PRN
Status: DISCONTINUED | OUTPATIENT
Start: 2020-05-27 | End: 2020-05-27 | Stop reason: SDUPTHER

## 2020-05-27 RX ORDER — WARFARIN SODIUM 5 MG/1
5 TABLET ORAL DAILY
Status: DISCONTINUED | OUTPATIENT
Start: 2020-05-27 | End: 2020-05-29 | Stop reason: HOSPADM

## 2020-05-27 RX ORDER — EPHEDRINE SULFATE/0.9% NACL/PF 50 MG/5 ML
SYRINGE (ML) INTRAVENOUS PRN
Status: DISCONTINUED | OUTPATIENT
Start: 2020-05-27 | End: 2020-05-27 | Stop reason: SDUPTHER

## 2020-05-27 RX ORDER — LIDOCAINE HYDROCHLORIDE 20 MG/ML
INJECTION, SOLUTION INTRAVENOUS PRN
Status: DISCONTINUED | OUTPATIENT
Start: 2020-05-27 | End: 2020-05-27 | Stop reason: SDUPTHER

## 2020-05-27 RX ORDER — ALBUTEROL SULFATE 2.5 MG/3ML
2.5 SOLUTION RESPIRATORY (INHALATION) EVERY 6 HOURS PRN
Status: DISCONTINUED | OUTPATIENT
Start: 2020-05-27 | End: 2020-05-28

## 2020-05-27 RX ORDER — SODIUM CHLORIDE, SODIUM LACTATE, POTASSIUM CHLORIDE, CALCIUM CHLORIDE 600; 310; 30; 20 MG/100ML; MG/100ML; MG/100ML; MG/100ML
INJECTION, SOLUTION INTRAVENOUS CONTINUOUS
Status: ACTIVE | OUTPATIENT
Start: 2020-05-27 | End: 2020-05-28

## 2020-05-27 RX ORDER — SODIUM CHLORIDE, SODIUM LACTATE, POTASSIUM CHLORIDE, CALCIUM CHLORIDE 600; 310; 30; 20 MG/100ML; MG/100ML; MG/100ML; MG/100ML
INJECTION, SOLUTION INTRAVENOUS CONTINUOUS PRN
Status: DISCONTINUED | OUTPATIENT
Start: 2020-05-27 | End: 2020-05-27 | Stop reason: SDUPTHER

## 2020-05-27 RX ORDER — FENTANYL CITRATE 50 UG/ML
INJECTION, SOLUTION INTRAMUSCULAR; INTRAVENOUS PRN
Status: DISCONTINUED | OUTPATIENT
Start: 2020-05-27 | End: 2020-05-27 | Stop reason: SDUPTHER

## 2020-05-27 RX ORDER — ACETAMINOPHEN 325 MG/1
650 TABLET ORAL EVERY 6 HOURS
Status: DISCONTINUED | OUTPATIENT
Start: 2020-05-27 | End: 2020-05-29 | Stop reason: HOSPADM

## 2020-05-27 RX ORDER — MAGNESIUM HYDROXIDE 1200 MG/15ML
LIQUID ORAL CONTINUOUS PRN
Status: COMPLETED | OUTPATIENT
Start: 2020-05-27 | End: 2020-05-27

## 2020-05-27 RX ORDER — SODIUM CHLORIDE 0.9 % (FLUSH) 0.9 %
10 SYRINGE (ML) INJECTION PRN
Status: DISCONTINUED | OUTPATIENT
Start: 2020-05-27 | End: 2020-05-29 | Stop reason: HOSPADM

## 2020-05-27 RX ORDER — CEFAZOLIN SODIUM 2 G/50ML
2 SOLUTION INTRAVENOUS
Status: COMPLETED | OUTPATIENT
Start: 2020-05-27 | End: 2020-05-27

## 2020-05-27 RX ORDER — CEFAZOLIN SODIUM 2 G/50ML
2 SOLUTION INTRAVENOUS EVERY 8 HOURS
Status: COMPLETED | OUTPATIENT
Start: 2020-05-27 | End: 2020-05-28

## 2020-05-27 RX ORDER — SODIUM CHLORIDE, SODIUM LACTATE, POTASSIUM CHLORIDE, CALCIUM CHLORIDE 600; 310; 30; 20 MG/100ML; MG/100ML; MG/100ML; MG/100ML
INJECTION, SOLUTION INTRAVENOUS
Status: COMPLETED
Start: 2020-05-27 | End: 2020-05-27

## 2020-05-27 RX ORDER — DEXAMETHASONE SODIUM PHOSPHATE 10 MG/ML
INJECTION INTRAMUSCULAR; INTRAVENOUS PRN
Status: DISCONTINUED | OUTPATIENT
Start: 2020-05-27 | End: 2020-05-27 | Stop reason: SDUPTHER

## 2020-05-27 RX ORDER — TRANEXAMIC ACID 650 1/1
1300 TABLET ORAL EVERY 8 HOURS
Status: COMPLETED | OUTPATIENT
Start: 2020-05-27 | End: 2020-05-27

## 2020-05-27 RX ORDER — ROCURONIUM BROMIDE 10 MG/ML
INJECTION, SOLUTION INTRAVENOUS PRN
Status: DISCONTINUED | OUTPATIENT
Start: 2020-05-27 | End: 2020-05-27 | Stop reason: SDUPTHER

## 2020-05-27 RX ORDER — FUROSEMIDE 20 MG/1
20 TABLET ORAL 2 TIMES DAILY
Status: DISCONTINUED | OUTPATIENT
Start: 2020-05-27 | End: 2020-05-29 | Stop reason: HOSPADM

## 2020-05-27 RX ORDER — TRANEXAMIC ACID 650 1/1
1300 TABLET ORAL ONCE
Status: COMPLETED | OUTPATIENT
Start: 2020-05-28 | End: 2020-05-28

## 2020-05-27 RX ORDER — ALBUTEROL SULFATE 90 UG/1
2 AEROSOL, METERED RESPIRATORY (INHALATION) 3 TIMES DAILY
Status: DISCONTINUED | OUTPATIENT
Start: 2020-05-27 | End: 2020-05-28

## 2020-05-27 RX ORDER — ONDANSETRON 2 MG/ML
INJECTION INTRAMUSCULAR; INTRAVENOUS PRN
Status: DISCONTINUED | OUTPATIENT
Start: 2020-05-27 | End: 2020-05-27 | Stop reason: SDUPTHER

## 2020-05-27 RX ADMIN — TRANEXAMIC ACID 1300 MG: 650 TABLET ORAL at 17:48

## 2020-05-27 RX ADMIN — MONTELUKAST SODIUM 10 MG: 10 TABLET, FILM COATED ORAL at 20:56

## 2020-05-27 RX ADMIN — PANTOPRAZOLE SODIUM 40 MG: 40 TABLET, DELAYED RELEASE ORAL at 06:33

## 2020-05-27 RX ADMIN — PROPOFOL 10 MG: 10 INJECTION, EMULSION INTRAVENOUS at 11:51

## 2020-05-27 RX ADMIN — PHENYLEPHRINE HYDROCHLORIDE 50 MCG: 10 INJECTION INTRAVENOUS at 11:16

## 2020-05-27 RX ADMIN — PHENYLEPHRINE HYDROCHLORIDE 100 MCG: 10 INJECTION INTRAVENOUS at 11:19

## 2020-05-27 RX ADMIN — Medication 5 MG: at 11:40

## 2020-05-27 RX ADMIN — LIDOCAINE HYDROCHLORIDE 40 MG: 20 INJECTION, SOLUTION INTRAVENOUS at 10:14

## 2020-05-27 RX ADMIN — SOTALOL HYDROCHLORIDE 120 MG: 120 TABLET ORAL at 08:31

## 2020-05-27 RX ADMIN — ONDANSETRON 4 MG: 2 INJECTION INTRAMUSCULAR; INTRAVENOUS at 11:02

## 2020-05-27 RX ADMIN — Medication 5 MG: at 11:49

## 2020-05-27 RX ADMIN — HYDROXYCHLOROQUINE SULFATE 100 MG: 200 TABLET, FILM COATED ORAL at 20:56

## 2020-05-27 RX ADMIN — CEFAZOLIN SODIUM 2 G: 2 SOLUTION INTRAVENOUS at 17:49

## 2020-05-27 RX ADMIN — SUGAMMADEX 200 MG: 100 INJECTION, SOLUTION INTRAVENOUS at 11:27

## 2020-05-27 RX ADMIN — FENTANYL CITRATE 25 MCG: 50 INJECTION, SOLUTION INTRAMUSCULAR; INTRAVENOUS at 12:03

## 2020-05-27 RX ADMIN — TIOTROPIUM BROMIDE INHALATION SPRAY 2 PUFF: 3.12 SPRAY, METERED RESPIRATORY (INHALATION) at 07:34

## 2020-05-27 RX ADMIN — PHENYLEPHRINE HYDROCHLORIDE 100 MCG: 10 INJECTION INTRAVENOUS at 10:44

## 2020-05-27 RX ADMIN — DOCUSATE SODIUM 50MG AND SENNOSIDES 8.6MG 2 TABLET: 8.6; 5 TABLET, FILM COATED ORAL at 20:55

## 2020-05-27 RX ADMIN — ALBUTEROL SULFATE 2 PUFF: 90 AEROSOL, METERED RESPIRATORY (INHALATION) at 19:48

## 2020-05-27 RX ADMIN — Medication 5 MG: at 11:28

## 2020-05-27 RX ADMIN — ROCURONIUM BROMIDE 30 MG: 10 INJECTION INTRAVENOUS at 10:16

## 2020-05-27 RX ADMIN — Medication 5 MG: at 10:40

## 2020-05-27 RX ADMIN — POTASSIUM CHLORIDE 20 MEQ: 20 TABLET, EXTENDED RELEASE ORAL at 20:56

## 2020-05-27 RX ADMIN — DEXAMETHASONE SODIUM PHOSPHATE 10 MG: 10 INJECTION INTRAMUSCULAR; INTRAVENOUS at 10:34

## 2020-05-27 RX ADMIN — ACETAMINOPHEN 650 MG: 325 TABLET, FILM COATED ORAL at 14:45

## 2020-05-27 RX ADMIN — PHENYLEPHRINE HYDROCHLORIDE 50 MCG: 10 INJECTION INTRAVENOUS at 11:10

## 2020-05-27 RX ADMIN — FENTANYL CITRATE 25 MCG: 50 INJECTION, SOLUTION INTRAMUSCULAR; INTRAVENOUS at 10:59

## 2020-05-27 RX ADMIN — FENTANYL CITRATE 25 MCG: 50 INJECTION, SOLUTION INTRAMUSCULAR; INTRAVENOUS at 11:59

## 2020-05-27 RX ADMIN — SODIUM CHLORIDE, POTASSIUM CHLORIDE, SODIUM LACTATE AND CALCIUM CHLORIDE: 600; 310; 30; 20 INJECTION, SOLUTION INTRAVENOUS at 11:45

## 2020-05-27 RX ADMIN — PROPOFOL 100 MG: 10 INJECTION, EMULSION INTRAVENOUS at 10:14

## 2020-05-27 RX ADMIN — FUROSEMIDE 20 MG: 20 TABLET ORAL at 20:56

## 2020-05-27 RX ADMIN — PHENYLEPHRINE HYDROCHLORIDE 100 MCG: 10 INJECTION INTRAVENOUS at 11:49

## 2020-05-27 RX ADMIN — Medication 5 MG: at 10:31

## 2020-05-27 RX ADMIN — CEFAZOLIN SODIUM 2 G: 2 SOLUTION INTRAVENOUS at 10:25

## 2020-05-27 RX ADMIN — PHENYLEPHRINE HYDROCHLORIDE 100 MCG: 10 INJECTION INTRAVENOUS at 11:29

## 2020-05-27 RX ADMIN — PHENYLEPHRINE HYDROCHLORIDE 100 MCG: 10 INJECTION INTRAVENOUS at 10:31

## 2020-05-27 RX ADMIN — PROPOFOL 20 MG: 10 INJECTION, EMULSION INTRAVENOUS at 11:32

## 2020-05-27 RX ADMIN — SODIUM CHLORIDE, POTASSIUM CHLORIDE, SODIUM LACTATE AND CALCIUM CHLORIDE: 600; 310; 30; 20 INJECTION, SOLUTION INTRAVENOUS at 10:10

## 2020-05-27 RX ADMIN — PHENYLEPHRINE HYDROCHLORIDE 100 MCG: 10 INJECTION INTRAVENOUS at 11:58

## 2020-05-27 RX ADMIN — PROPOFOL 20 MG: 10 INJECTION, EMULSION INTRAVENOUS at 11:40

## 2020-05-27 RX ADMIN — PHENYLEPHRINE HYDROCHLORIDE 100 MCG: 10 INJECTION INTRAVENOUS at 10:50

## 2020-05-27 RX ADMIN — TRANEXAMIC ACID 1300 MG: 650 TABLET ORAL at 08:52

## 2020-05-27 RX ADMIN — FENTANYL CITRATE 25 MCG: 50 INJECTION, SOLUTION INTRAMUSCULAR; INTRAVENOUS at 10:16

## 2020-05-27 RX ADMIN — PHENYLEPHRINE HYDROCHLORIDE 100 MCG: 10 INJECTION INTRAVENOUS at 11:43

## 2020-05-27 RX ADMIN — Medication 10 ML: at 08:31

## 2020-05-27 RX ADMIN — ACETAMINOPHEN 650 MG: 325 TABLET, FILM COATED ORAL at 21:00

## 2020-05-27 RX ADMIN — Medication 5 MG: at 10:28

## 2020-05-27 ASSESSMENT — PULMONARY FUNCTION TESTS
PIF_VALUE: 2
PIF_VALUE: 23
PIF_VALUE: 21
PIF_VALUE: 2
PIF_VALUE: 3
PIF_VALUE: 29
PIF_VALUE: 2
PIF_VALUE: 20
PIF_VALUE: 17
PIF_VALUE: 25
PIF_VALUE: 23
PIF_VALUE: 25
PIF_VALUE: 20
PIF_VALUE: 3
PIF_VALUE: 20
PIF_VALUE: 20
PIF_VALUE: 21
PIF_VALUE: 2
PIF_VALUE: 24
PIF_VALUE: 21
PIF_VALUE: 16
PIF_VALUE: 20
PIF_VALUE: 2
PIF_VALUE: 25
PIF_VALUE: 2
PIF_VALUE: 0
PIF_VALUE: 22
PIF_VALUE: 1
PIF_VALUE: 20
PIF_VALUE: 22
PIF_VALUE: 20
PIF_VALUE: 23
PIF_VALUE: 2
PIF_VALUE: 2
PIF_VALUE: 0
PIF_VALUE: 18
PIF_VALUE: 16
PIF_VALUE: 13
PIF_VALUE: 3
PIF_VALUE: 19
PIF_VALUE: 25
PIF_VALUE: 2
PIF_VALUE: 1
PIF_VALUE: 15
PIF_VALUE: 3
PIF_VALUE: 20
PIF_VALUE: 2
PIF_VALUE: 21
PIF_VALUE: 23
PIF_VALUE: 24
PIF_VALUE: 2
PIF_VALUE: 19
PIF_VALUE: 24
PIF_VALUE: 3
PIF_VALUE: 26
PIF_VALUE: 15
PIF_VALUE: 3
PIF_VALUE: 4
PIF_VALUE: 1
PIF_VALUE: 26
PIF_VALUE: 2
PIF_VALUE: 24
PIF_VALUE: 3
PIF_VALUE: 22
PIF_VALUE: 21
PIF_VALUE: 21
PIF_VALUE: 23
PIF_VALUE: 3
PIF_VALUE: 2
PIF_VALUE: 21
PIF_VALUE: 2
PIF_VALUE: 25
PIF_VALUE: 1
PIF_VALUE: 22
PIF_VALUE: 25
PIF_VALUE: 1
PIF_VALUE: 20
PIF_VALUE: 20
PIF_VALUE: 17
PIF_VALUE: 22
PIF_VALUE: 1
PIF_VALUE: 20
PIF_VALUE: 16
PIF_VALUE: 24
PIF_VALUE: 2
PIF_VALUE: 20
PIF_VALUE: 6
PIF_VALUE: 12
PIF_VALUE: 15
PIF_VALUE: 2
PIF_VALUE: 14
PIF_VALUE: 2
PIF_VALUE: 18
PIF_VALUE: 24
PIF_VALUE: 22
PIF_VALUE: 17
PIF_VALUE: 20
PIF_VALUE: 3
PIF_VALUE: 15
PIF_VALUE: 19
PIF_VALUE: 4
PIF_VALUE: 21
PIF_VALUE: 24
PIF_VALUE: 18
PIF_VALUE: 4

## 2020-05-27 ASSESSMENT — PAIN SCALES - GENERAL
PAINLEVEL_OUTOF10: 0
PAINLEVEL_OUTOF10: 4
PAINLEVEL_OUTOF10: 4
PAINLEVEL_OUTOF10: 2

## 2020-05-27 NOTE — PROGRESS NOTES
[facility] EMERGENCY [facility] AT [place] Respiratory Therapy Evaluation   Current Order:  ALB Q6 PRN      Home Regimen: Y      Ordering Physician: Leigh Ann Abbasi  Re-evaluation Date:  5/30     Diagnosis: POST OP      Patient Status: Stable / Unstable + Physician notified    The following MDI Criteria must be met in order to convert aerosol to MDI with spacer. If unable to meet, MDI will be converted to aerosol:  []  Patient able to demonstrate the ability to use MDI effectively  []  Patient alert and cooperative  []  Patient able to take deep breath with 5-10 second hold  []  Medication(s) available in this delivery method   []  Peak flow greater than or equal to 200 ml/min            Current Order Substituted To  (same drug, same frequency)   Aerosol to MDI [x] Albuterol Sulfate 0.083% unit dose by aerosol Albuterol Sulfate MDI 2 puffs by inhalation with spacer    [] Levalbuterol 1.25 mg unit dose by aerosol Levalbuterol MDI 2 puffs by inhalation with spacer    [] Levalbuterol 0.63 mg unit dose by aerosol Levalbuterol MDI 2 puffs by inhalation with spacer    [] Ipratropium Bromide 0.02% unit dose by aerosol Ipratropium Bromide MDI 2 puffs by inhalation with spacer    [] Duoneb (Ipratropium + Albuterol) unit dose by aerosol Ipratropium MDI + Albuterol MDI 2 puffs by inhalation w/spacer   MDI to Aerosol [] Albuterol Sulfate MDI Albuterol Sulfate 0.083% unit dose by aerosol    [] Levalbuterol MDI 2 puffs by inhalation Levalbuterol 1.25 mg unit dose by aerosol    [] Ipratropium Bromide MDI by inhalation Ipratropium Bromide 0.02% unit dose by aerosol    [] Combivent (Ipratropium + Albuterol) MDI by inhalation Duoneb (Ipratropium + Albuterol) unit dose by aerosol   Treatment Assessment [Frequency/Schedule]:  Change frequency to: _________ALB MDI TID _________________________________________per Protocol, P&T, MEC      Points 0 1 2 3 4   Pulmonary Status  Non-Smoker  []   Smoking history   < 20 pack years  []   Smoking history  ?  20 pack years  []

## 2020-05-27 NOTE — PROGRESS NOTES
8.8 8.1   RBC 4.17* 3.43*   HGB 11.3* 9.2*   HCT 32.9* 27.6*    132       CMP:    Recent Labs     05/25/20  1615 05/26/20  0522 05/27/20  0558   * 130* 132*   K 3.9 4.1 3.8   CL 90* 92* 94*   CO2 29 30 30   BUN 15 10 5*   CREATININE 0.79 0.59 0.39*   GFRAA >60.0 >60.0 >60.0   LABGLOM >60.0 >60.0 >60.0   GLUCOSE 126* 103* 106*   CALCIUM 8.7 8.9 9.0       Hepatic Function Panel:    Recent Labs     05/25/20  1615 05/27/20  0558   ALKPHOS 65  --    ALT 11  --    AST 22  --    PROT 6.0*  --    BILITOT 0.4  --    LABALBU 3.5 3.4*       Magnesium:    Recent Labs     05/27/20  0558   MG 2.1       PT/INR:    Recent Labs     05/26/20  0524 05/27/20  0558 05/27/20  1333   PROTIME 18.3* 14.0 13.8   INR 1.5 1.1 1.1       Lipids:  No results for input(s): CHOL, TRIG, HDL, LDLCALC, LABVLDL in the last 72 hours. Active Problems:    Closed intertrochanteric fracture of hip, left, initial encounter ([facility] [address]    Closed head injury  Resolved Problems:    * No resolved hospital problems.  *           Electronically signed by Dori Myrick MD on 5/27/2020 at 2:51 PM

## 2020-05-27 NOTE — ANESTHESIA PRE PROCEDURE
05/27/2020    MCV 80.3 05/27/2020    RDW 14.8 05/27/2020     05/27/2020       CMP:   Lab Results   Component Value Date     05/27/2020    K 3.8 05/27/2020    CL 94 05/27/2020    CO2 30 05/27/2020    BUN 5 05/27/2020    CREATININE 0.39 05/27/2020    GFRAA >60.0 05/27/2020    LABGLOM >60.0 05/27/2020    GLUCOSE 106 05/27/2020    GLUCOSE 105 11/07/2011    PROT 6.0 05/25/2020    CALCIUM 9.0 05/27/2020    BILITOT 0.4 05/25/2020    ALKPHOS 65 05/25/2020    AST 22 05/25/2020    ALT 11 05/25/2020       POC Tests: No results for input(s): POCGLU, POCNA, POCK, POCCL, POCBUN, POCHEMO, POCHCT in the last 72 hours. Coags:   Lab Results   Component Value Date    PROTIME 14.0 05/27/2020    PROTIME 10.7 11/06/2011    INR 1.1 05/27/2020    APTT 49.4 05/25/2020       HCG (If Applicable): No results found for: PREGTESTUR, PREGSERUM, HCG, HCGQUANT     ABGs: No results found for: PHART, PO2ART, MEC1XWI, TPR8MAH, BEART, B7YBMHWV     Type & Screen (If Applicable):  No results found for: LABABO, LABRH    Drug/Infectious Status (If Applicable):  No results found for: HIV, HEPCAB    COVID-19 Screening (If Applicable):   Lab Results   Component Value Date    COVID19 Not Detected 05/26/2020         Anesthesia Evaluation  Patient summary reviewed and Nursing notes reviewed no history of anesthetic complications:   Airway: Mallampati: II  TM distance: >3 FB     Mouth opening: > = 3 FB Dental: normal exam         Pulmonary:normal exam    (+) COPD:                             Cardiovascular:    (+) hypertension: no interval change,       ECG reviewed                        Neuro/Psych:   Negative Neuro/Psych ROS              GI/Hepatic/Renal: Neg GI/Hepatic/Renal ROS            Endo/Other:    (+) : arthritis:., .                 Abdominal:           Vascular: negative vascular ROS. Anesthesia Plan      general     ASA 3           MIPS: Prophylactic antiemetics administered.   Anesthetic plan and risks discussed with patient. Plan discussed with CRNA.     Attending anesthesiologist reviewed and agrees with Pre Eval content              Dori Mcknight MD   5/27/2020

## 2020-05-27 NOTE — PROGRESS NOTES
Patient ID:  Roverto Salas  [id number]  54 y.o.  1935  BOVIE PAD SITE CLEAR AND INTACT PRE AND POST OP. TAKEN TO PACU,   ATTACHED TO MONITOR AND REPORT GIVEN TO RN.   VSS DRSG DRY AND INTACT  DENTURES IN LABELED DENTURE CUP IN LABELED BAG ON PATIENT BED        Electronically signed by Carlos Cox RN on 5/27/2020

## 2020-05-27 NOTE — OP NOTE
guide.  This was placed with the same distal incision flush against the cortex. This was placed under routine technique with drilling measurement and placement of 36 mm derotation screw. The guides were all removed final x-rays were taken noted satisfactory alignment and carlos alberto placement. Final x-rays were taken. Attention was turned toward closure of the ITB and were was repaired in both locations with a Vicryl suture followed by closure approximation and closure of the skin in multiple layers including subcuticular and external layer. Sterile dressings were applied. Disposition will be recovery room and to the floor. Due to the comminution of the greater troches the patient will be made only 25% weightbearing on the left hip.     Electronically signed by Kerri Geller MD on 5/27/2020 at 11:24 AM

## 2020-05-27 NOTE — PROGRESS NOTES
INPATIENT PROGRESS NOTES    PATIENT NAME: Ted Benjamin  MRN: [id number]  SERVICE DATE:  May 27, 2020   SERVICE TIME:  2:13 PM      PRIMARY SERVICE: Pulmonary Disease    CHIEF COMPLAINTS: Cough    INTERVAL HPI: Patient seen and examined at bedside, Interval Notes, orders reviewed. Nursing notes noted    Patient report feeling better today, cough is stable at her baseline, no phlegm, no chest pain, no shortness of breath, she had her surgery this morning with no issues    Review of system:     GI Abdominal pain No  Skin Rash No    Social History     Tobacco Use    Smoking status: Former Smoker     Types: Cigarettes    Smokeless tobacco: Never Used   Substance Use Topics    Alcohol use: Not Currently     History reviewed. No pertinent family history. OBJECTIVE    Body mass index is 26.63 kg/m². PHYSICAL EXAM:  Vitals:  BP (!) 87/48   Pulse 60   Temp 96.9 °F (36.1 °C)   Resp 20   Ht 5' 5\" (1.651 m)   Wt 160 lb (72.6 kg)   LMP  (LMP Unknown)   SpO2 100%   BMI 26.63 kg/m²     General: alert, cooperative, no distress  Head: normocephalic, atraumatic  Eyes:No gross abnormalities. and PERRL  ENT:  MMM no lesions  Neck:  supple and no masses  Chest : Good air movement, minimal rales at the left base, no wheezing, nontender, tympanic  Heart[de-identified] Heart sounds are normal.  Regular rate and rhythm without murmur, gallop or rub. ABD:  symmetric, soft, non-tender  Musculoskeletal : no cyanosis, no clubbing and no edema  Neuro:  Grossly normal  Skin: No rashes or nodules noted.   Lymph node:  no cervical nodes  Urology: No Ortega   Psychiatric: appropriate    DATA:   Recent Labs     05/25/20  1615 05/27/20  0558   WBC 8.8 8.1   HGB 11.3* 9.2*   HCT 32.9* 27.6*   MCV 78.8* 80.3*    132     Recent Labs     05/25/20  1615 05/26/20  0522 05/27/20  0558   * 130* 132*   K 3.9 4.1 3.8   CL 90* 92* 94*   CO2 29 30 30   BUN 15 10 5*   CREATININE 0.79 0.59 0.39*   GLUCOSE 126* 103* 106*   CALCIUM 8.7 8.9 9.0 PROT 6.0*  --   --    LABALBU 3.5  --  3.4*   BILITOT 0.4  --   --    ALKPHOS 65  --   --    AST 22  --   --    ALT 11  --   --    LABGLOM >60.0 >60.0 >60.0   GFRAA >60.0 >60.0 >60.0   GLOB 2.5  --   --        MV Settings:          No results for input(s): PHART, YQB3ZXO, PO2ART, ZLC6OLL, BEART, Q3RGINEW in the last 72 hours. O2 Device: Simple Mask  O2 Flow Rate (L/min): 8 L/min    DIET CLEAR LIQUID;     MEDICATIONS during current hospitalization:    Continuous Infusions:   lactated ringers 50 mL/hr at 05/27/20 1359       Scheduled Meds:   furosemide  20 mg Oral BID    potassium chloride  20 mEq Oral BID    sodium chloride flush  10 mL Intravenous 2 times per day    ceFAZolin (ANCEF) IVPB  2 g Intravenous Q8H    acetaminophen  650 mg Oral Q6H    warfarin  5 mg Oral Daily    acetaminophen  1,000 mg Oral Daily    pantoprazole  40 mg Oral QAM AC    senna-docusate  2 tablet Oral BID    montelukast  10 mg Oral Nightly    tiotropium  2 puff Inhalation Daily    hydroxychloroquine  100 mg Oral BID    sotalol  120 mg Oral BID    lisinopril  20 mg Oral Nightly    magnesium oxide  400 mg Oral Daily       PRN Meds:albuterol, sodium chloride flush, famotidine (PEPCID) injection, HYDROcodone 5 mg - acetaminophen **OR** HYDROcodone 5 mg - acetaminophen **OR** HYDROcodone-acetaminophen, morphine, methocarbamol, magnesium hydroxide, promethazine **OR** ondansetron    Radiology  Xr Chest (single View Frontal)    Result Date: 5/26/2020  EXAMINATION: CHEST PORTABLE VIEW  CLINICAL HISTORY: Short of breath COMPARISONS: May 25, 2020  FINDINGS: 2 views of the chest is submitted. Left-sided CCD device. Leads overlying the cardiac silhouette. Unchanged  The cardiac silhouette is of normal size configuration. Pulmonary vascular attenuated. Lung fields are hyperinflated. Coarse interstitium. Right sided trachea. No focal infiltrates. No Pneumothoraces. FALL, HEADACHE TECHNIQUE:  CT brain is obtained without IV contrast agent. FINDINGS: An unenhanced CT scan of the brain demonstrates no evidence of a skull fracture. There is cerebral atrophy and age related findings in the brain. There is no acute CVA. There is no acute intra-axial or extra-axial findings in the brain. There is no hydrocephalus, intracranial mass, hemorrhage, \"mass effect\" or midline shift. The remainder of the CT scan of the brain appears unremarkable. 1. CEREBRAL ATROPHY AND AGE RELATED FINDINGS IN THE BRAIN. 2. NO ACUTE INTRA-AXIAL OR EXTRA-AXIAL FINDINGS IN THE BRAIN. All CT scans at this facility use dose modulation, iterative reconstruction, and/or weight based dosing when appropriate to reduce radiation dose to as low as reasonably achievable. Ct Cervical Spine Wo Contrast    Result Date: 5/25/2020  EXAMINATION: CT CERVICAL SPINE WITHOUT CONTRAST CLINICAL HISTORY: TRAUMA, HEAD AND NECK INJURY IN FALL, NECK PAIN, EVALUATE FOR C-SPINE FRACTURE COMPARISONS: None available. TECHNIQUE:  Routine axial and MPR CT images of the cervical spine were obtained. FINDINGS: There is no prevertebral soft tissue swelling. 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 There is cervical spondylosis with degenerative bone spurring and posterior facet arthritis at multiple cervical vertebral levels. There is uncovertebral joint arthropathy throughout the cervical spine. 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 There is no fracture, subluxation or dislocation involving the cervical spine. There are no osteolytic or osteoblastic lesion in the cervical spine. No acute traumatic bone injury involving the cervical spine. 1.  DEGENERATIVE AND ARTHRITIC CHANGES THROUGHOUT THE C-SPINE AS DESCRIBED. 2.  NO ACUTE FRACTURE, SUBLUXATION OR DISLOCATION INVOLVING THE CERVICAL SPINE.  All CT scans at to 90%  · Early mobility and pain control               Electronically signed by Devang Rai MD,  [facility]   on 5/27/2020 at 2:13 PM

## 2020-05-28 ENCOUNTER — APPOINTMENT (OUTPATIENT)
Dept: GENERAL RADIOLOGY | Age: 85
DRG: 481 | End: 2020-05-28
Payer: MEDICARE

## 2020-05-28 LAB
ALBUMIN SERPL-MCNC: 2.8 G/DL (ref 3.5–4.6)
ANION GAP SERPL CALCULATED.3IONS-SCNC: 5 MEQ/L (ref 9–15)
BUN BLDV-MCNC: 9 MG/DL (ref 8–23)
CALCIUM SERPL-MCNC: 8.7 MG/DL (ref 8.5–9.9)
CHLORIDE BLD-SCNC: 95 MEQ/L (ref 95–107)
CO2: 29 MEQ/L (ref 20–31)
CREAT SERPL-MCNC: 0.5 MG/DL (ref 0.5–0.9)
FOLATE: 14.5 NG/ML (ref 7.3–26.1)
GFR AFRICAN AMERICAN: >60
GFR NON-AFRICAN AMERICAN: >60
GLUCOSE BLD-MCNC: 98 MG/DL (ref 70–99)
HCT VFR BLD CALC: 22.2 % (ref 37–47)
HEMOGLOBIN: 7.4 G/DL (ref 12–16)
INR BLD: 1.1
MAGNESIUM: 2 MG/DL (ref 1.7–2.4)
MCH RBC QN AUTO: 26.9 PG (ref 27–31.3)
MCHC RBC AUTO-ENTMCNC: 33.4 % (ref 33–37)
MCV RBC AUTO: 80.7 FL (ref 82–100)
PDW BLD-RTO: 14.9 % (ref 11.5–14.5)
PHOSPHORUS: 2.6 MG/DL (ref 2.3–4.8)
PLATELET # BLD: 132 K/UL (ref 130–400)
POTASSIUM REFLEX MAGNESIUM: 4.9 MEQ/L (ref 3.4–4.9)
POTASSIUM SERPL-SCNC: 4.9 MEQ/L (ref 3.4–4.9)
PROTHROMBIN TIME: 13.9 SEC (ref 12.3–14.9)
RBC # BLD: 2.75 M/UL (ref 4.2–5.4)
SODIUM BLD-SCNC: 129 MEQ/L (ref 135–144)
VITAMIN B-12: 1554 PG/ML (ref 232–1245)
WBC # BLD: 9 K/UL (ref 4.8–10.8)

## 2020-05-28 PROCEDURE — 6370000000 HC RX 637 (ALT 250 FOR IP): Performed by: ORTHOPAEDIC SURGERY

## 2020-05-28 PROCEDURE — 97162 PT EVAL MOD COMPLEX 30 MIN: CPT

## 2020-05-28 PROCEDURE — 94760 N-INVAS EAR/PLS OXIMETRY 1: CPT

## 2020-05-28 PROCEDURE — 71045 X-RAY EXAM CHEST 1 VIEW: CPT

## 2020-05-28 PROCEDURE — 80069 RENAL FUNCTION PANEL: CPT

## 2020-05-28 PROCEDURE — 36415 COLL VENOUS BLD VENIPUNCTURE: CPT

## 2020-05-28 PROCEDURE — 1210000000 HC MED SURG R&B

## 2020-05-28 PROCEDURE — 6370000000 HC RX 637 (ALT 250 FOR IP): Performed by: INTERNAL MEDICINE

## 2020-05-28 PROCEDURE — 94640 AIRWAY INHALATION TREATMENT: CPT

## 2020-05-28 PROCEDURE — P9016 RBC LEUKOCYTES REDUCED: HCPCS

## 2020-05-28 PROCEDURE — 85027 COMPLETE CBC AUTOMATED: CPT

## 2020-05-28 PROCEDURE — 85610 PROTHROMBIN TIME: CPT

## 2020-05-28 PROCEDURE — 36430 TRANSFUSION BLD/BLD COMPNT: CPT

## 2020-05-28 PROCEDURE — 6360000002 HC RX W HCPCS: Performed by: ORTHOPAEDIC SURGERY

## 2020-05-28 PROCEDURE — 97166 OT EVAL MOD COMPLEX 45 MIN: CPT

## 2020-05-28 PROCEDURE — 2580000003 HC RX 258

## 2020-05-28 PROCEDURE — 83735 ASSAY OF MAGNESIUM: CPT

## 2020-05-28 PROCEDURE — 99233 SBSQ HOSP IP/OBS HIGH 50: CPT | Performed by: INTERNAL MEDICINE

## 2020-05-28 PROCEDURE — 82746 ASSAY OF FOLIC ACID SERUM: CPT

## 2020-05-28 PROCEDURE — 2580000003 HC RX 258: Performed by: ORTHOPAEDIC SURGERY

## 2020-05-28 PROCEDURE — 97535 SELF CARE MNGMENT TRAINING: CPT

## 2020-05-28 PROCEDURE — 2700000000 HC OXYGEN THERAPY PER DAY

## 2020-05-28 PROCEDURE — 6370000000 HC RX 637 (ALT 250 FOR IP): Performed by: NURSE PRACTITIONER

## 2020-05-28 PROCEDURE — 82607 VITAMIN B-12: CPT

## 2020-05-28 RX ORDER — ALBUTEROL SULFATE 2.5 MG/3ML
2.5 SOLUTION RESPIRATORY (INHALATION) 3 TIMES DAILY
Status: DISCONTINUED | OUTPATIENT
Start: 2020-05-28 | End: 2020-05-28

## 2020-05-28 RX ORDER — PREDNISONE 20 MG/1
40 TABLET ORAL DAILY
Status: DISCONTINUED | OUTPATIENT
Start: 2020-05-28 | End: 2020-05-29 | Stop reason: HOSPADM

## 2020-05-28 RX ORDER — ALBUTEROL SULFATE 90 UG/1
2 AEROSOL, METERED RESPIRATORY (INHALATION) EVERY 4 HOURS PRN
Status: DISCONTINUED | OUTPATIENT
Start: 2020-05-28 | End: 2020-05-29 | Stop reason: HOSPADM

## 2020-05-28 RX ORDER — BUDESONIDE AND FORMOTEROL FUMARATE DIHYDRATE 80; 4.5 UG/1; UG/1
2 AEROSOL RESPIRATORY (INHALATION) 2 TIMES DAILY
Status: DISCONTINUED | OUTPATIENT
Start: 2020-05-28 | End: 2020-05-29 | Stop reason: HOSPADM

## 2020-05-28 RX ORDER — SODIUM CHLORIDE 9 MG/ML
INJECTION, SOLUTION INTRAVENOUS
Status: COMPLETED
Start: 2020-05-28 | End: 2020-05-28

## 2020-05-28 RX ADMIN — SODIUM CHLORIDE 250 ML: 9 INJECTION, SOLUTION INTRAVENOUS at 18:12

## 2020-05-28 RX ADMIN — LISINOPRIL 20 MG: 20 TABLET ORAL at 21:51

## 2020-05-28 RX ADMIN — HYDROXYCHLOROQUINE SULFATE 100 MG: 200 TABLET, FILM COATED ORAL at 10:00

## 2020-05-28 RX ADMIN — Medication 10 ML: at 10:00

## 2020-05-28 RX ADMIN — CEFAZOLIN SODIUM 2 G: 2 SOLUTION INTRAVENOUS at 03:09

## 2020-05-28 RX ADMIN — PREDNISONE 40 MG: 20 TABLET ORAL at 15:27

## 2020-05-28 RX ADMIN — PANTOPRAZOLE SODIUM 40 MG: 40 TABLET, DELAYED RELEASE ORAL at 06:04

## 2020-05-28 RX ADMIN — WARFARIN SODIUM 5 MG: 5 TABLET ORAL at 18:09

## 2020-05-28 RX ADMIN — ALBUTEROL SULFATE 2 PUFF: 90 AEROSOL, METERED RESPIRATORY (INHALATION) at 13:19

## 2020-05-28 RX ADMIN — ACETAMINOPHEN 650 MG: 325 TABLET, FILM COATED ORAL at 03:09

## 2020-05-28 RX ADMIN — ALBUTEROL SULFATE 2.5 MG: 2.5 SOLUTION RESPIRATORY (INHALATION) at 10:24

## 2020-05-28 RX ADMIN — DOCUSATE SODIUM 50MG AND SENNOSIDES 8.6MG 2 TABLET: 8.6; 5 TABLET, FILM COATED ORAL at 21:46

## 2020-05-28 RX ADMIN — MONTELUKAST SODIUM 10 MG: 10 TABLET, FILM COATED ORAL at 21:45

## 2020-05-28 RX ADMIN — BUDESONIDE AND FORMOTEROL FUMARATE DIHYDRATE 2 PUFF: 80; 4.5 AEROSOL RESPIRATORY (INHALATION) at 20:00

## 2020-05-28 RX ADMIN — HYDROXYCHLOROQUINE SULFATE 100 MG: 200 TABLET, FILM COATED ORAL at 21:45

## 2020-05-28 RX ADMIN — TIOTROPIUM BROMIDE INHALATION SPRAY 2 PUFF: 3.12 SPRAY, METERED RESPIRATORY (INHALATION) at 07:27

## 2020-05-28 RX ADMIN — ACETAMINOPHEN 1000 MG: 500 TABLET ORAL at 09:58

## 2020-05-28 RX ADMIN — SOTALOL HYDROCHLORIDE 120 MG: 120 TABLET ORAL at 21:45

## 2020-05-28 RX ADMIN — TRANEXAMIC ACID 1300 MG: 650 TABLET ORAL at 06:04

## 2020-05-28 RX ADMIN — METHOCARBAMOL TABLETS 500 MG: 500 TABLET, COATED ORAL at 21:45

## 2020-05-28 RX ADMIN — POTASSIUM CHLORIDE 20 MEQ: 20 TABLET, EXTENDED RELEASE ORAL at 09:59

## 2020-05-28 RX ADMIN — ALBUTEROL SULFATE 2 PUFF: 90 AEROSOL, METERED RESPIRATORY (INHALATION) at 07:27

## 2020-05-28 RX ADMIN — ACETAMINOPHEN 650 MG: 325 TABLET, FILM COATED ORAL at 15:27

## 2020-05-28 RX ADMIN — SOTALOL HYDROCHLORIDE 120 MG: 120 TABLET ORAL at 10:10

## 2020-05-28 RX ADMIN — ACETAMINOPHEN 650 MG: 325 TABLET, FILM COATED ORAL at 21:46

## 2020-05-28 RX ADMIN — Medication 400 MG: at 09:59

## 2020-05-28 RX ADMIN — HYDROCODONE BITARTRATE AND ACETAMINOPHEN 1 TABLET: 5; 325 TABLET ORAL at 11:28

## 2020-05-28 RX ADMIN — DOCUSATE SODIUM 50MG AND SENNOSIDES 8.6MG 2 TABLET: 8.6; 5 TABLET, FILM COATED ORAL at 09:58

## 2020-05-28 RX ADMIN — POTASSIUM CHLORIDE 20 MEQ: 20 TABLET, EXTENDED RELEASE ORAL at 21:46

## 2020-05-28 ASSESSMENT — PAIN SCALES - GENERAL
PAINLEVEL_OUTOF10: 4
PAINLEVEL_OUTOF10: 2
PAINLEVEL_OUTOF10: 4
PAINLEVEL_OUTOF10: 1
PAINLEVEL_OUTOF10: 4
PAINLEVEL_OUTOF10: 2

## 2020-05-28 ASSESSMENT — PAIN DESCRIPTION - ORIENTATION
ORIENTATION: LEFT
ORIENTATION: LEFT

## 2020-05-28 ASSESSMENT — PAIN DESCRIPTION - FREQUENCY: FREQUENCY: INTERMITTENT

## 2020-05-28 ASSESSMENT — PAIN DESCRIPTION - DESCRIPTORS: DESCRIPTORS: ACHING;SORE

## 2020-05-28 ASSESSMENT — PAIN - FUNCTIONAL ASSESSMENT: PAIN_FUNCTIONAL_ASSESSMENT: PREVENTS OR INTERFERES WITH MANY ACTIVE NOT PASSIVE ACTIVITIES

## 2020-05-28 ASSESSMENT — PAIN DESCRIPTION - PAIN TYPE
TYPE: ACUTE PAIN
TYPE: ACUTE PAIN

## 2020-05-28 ASSESSMENT — PAIN DESCRIPTION - PROGRESSION: CLINICAL_PROGRESSION: GRADUALLY IMPROVING

## 2020-05-28 ASSESSMENT — PAIN DESCRIPTION - LOCATION
LOCATION: HIP
LOCATION: HIP

## 2020-05-28 NOTE — PROGRESS NOTES
[facility] OF THE [facility] Heart Progress Note      Patient:  Radha Tate  YOB: 1935  MRN: [id number]   Acct: [de-identified]  Admit Date:  5/25/2020  Primary Cardiologist:Thierry Hidalgo Cardiologist: Elin Terry  APRN_CNP    Impression/Plan:     1. Post op: stable. 2. No short of breath  3. Persis a.fib: no recurrence to date. SR/paced with PVC's  4. Resume anticoag today, per ortho OK to restart coumadin today. 5.         Hyponatremia: Continues, Na 129  6. Anemia post op:  Suggest to maintain Hgb greater than 8      Chief Complaint/Active Symptoms: No angina/dyspnea/palpitations. Mild residual surgical pain laying in bed. Overall feels better.        Review of Systems:   Mild hip pain    CARDIAC HISTORY:  Persistent atrial fib  Echo 5/26/20  Normal  HTN  2019-MAY normal lexiscan  RBBB  Hx diastolic chf      Objective:   Vitals:    05/27/20 1949 05/27/20 2357 05/28/20 0727 05/28/20 0735   BP:  (!) 97/42  (!) 115/45   Pulse:  60  60   Resp: 16 16 16 16   Temp:  97.3 °F (36.3 °C)  97.5 °F (36.4 °C)   TempSrc:  Oral  Oral   SpO2: 99% 100% 99% 100%   Weight:       Height:          Wt Readings from Last 3 Encounters:   05/25/20 160 lb (72.6 kg)       TELEMETRY: normal sinus  and paced                            Rhythm Strip: sinus with PVC's    Physical Exam:  General Appearance: alert and oriented to person, place and time, in no acute distress  Cardiovascular: normal rate, regular rhythm, normal S1 and S2, no murmurs, rubs, clicks, or gallops,  no JVD  Pulmonary/Chest: clear to auscultation bilaterally- no wheezes, rales or rhonchi, normal air movement, no respiratory distress  Abdomen: soft, non-tender, non-distended, normal bowel sounds, no masses   Extremities: no cyanosis, clubbing or edema  Skin: warm and dry, no rash or erythema  Eyes: EOMI  Neck: supple and non-tender without mass, no thyromegaly   Neurological: alert, oriented, normal speech, no focal findings or movement disorder noted    Allergies: Allergies   Allergen Reactions    Lipitor [Atorvastatin Calcium]      Leg cramping        Medications:    furosemide  20 mg Oral BID    potassium chloride  20 mEq Oral BID    sodium chloride flush  10 mL Intravenous 2 times per day    acetaminophen  650 mg Oral Q6H    warfarin  5 mg Oral Daily    albuterol sulfate HFA  2 puff Inhalation TID    acetaminophen  1,000 mg Oral Daily    pantoprazole  40 mg Oral QAM AC    senna-docusate  2 tablet Oral BID    montelukast  10 mg Oral Nightly    tiotropium  2 puff Inhalation Daily    hydroxychloroquine  100 mg Oral BID    sotalol  120 mg Oral BID    lisinopril  20 mg Oral Nightly    magnesium oxide  400 mg Oral Daily      lactated ringers 50 mL/hr at 05/27/20 1359     albuterol, 2.5 mg, Q6H PRN  sodium chloride flush, 10 mL, PRN  famotidine (PEPCID) injection, 20 mg, BID PRN  HYDROcodone 5 mg - acetaminophen, 0.5 tablet, Q4H PRN    Or  HYDROcodone 5 mg - acetaminophen, 1 tablet, Q4H PRN    Or  HYDROcodone-acetaminophen, 1 tablet, Q4H PRN  morphine, 1 mg, Q2H PRN  methocarbamol, 500 mg, 4x Daily PRN  magnesium hydroxide, 30 mL, Daily PRN  promethazine, 12.5 mg, Q6H PRN    Or  ondansetron, 4 mg, Q6H PRN        Diagnostics:  EKG:  Sinus/paced    Echo:  05/06/2020  Normal left ventricular systolic function, no regional wall motion   abnormalities, estimated ejection fraction of 55-60%. Normal left ventricular size and function. Normal left ventricular wall thickness. Normal diastolic filling pattern for age. No evidence of mitral regurgitation. No evidence of mitral valve stenosis. No evidence of aortic valve regurgitation . No evidence of aortic valve stenosis. There is Trace tricuspid regurgitation with estimated RVSP of 36 mm Hg. CXR: 2-view: No results found for this or any previous visit. Portable: No results found for this or any previous visit.       Lab Data:    Cardiac Enzymes:  No results for input(s): CKTOTAL, CKMB, CKMBINDEX, TROPONINI in the last 72 hours. ProBNP: No results found for: PROBNP    CBC:   Recent Labs     05/25/20  1615 05/27/20  0558 05/28/20  0542   WBC 8.8 8.1 9.0   RBC 4.17* 3.43* 2.75*   HGB 11.3* 9.2* 7.4*   HCT 32.9* 27.6* 22.2*    132 132       CMP:    Recent Labs     05/26/20  0522 05/27/20  0558 05/28/20  0542   * 132* 129*   K 4.1 3.8 4.9   CL 92* 94* 95   CO2 30 30 29   BUN 10 5* 9   CREATININE 0.59 0.39* 0.50   GFRAA >60.0 >60.0 >60.0   LABGLOM >60.0 >60.0 >60.0   GLUCOSE 103* 106* 98   CALCIUM 8.9 9.0 8.7       Hepatic Function Panel:    Recent Labs     05/25/20  1615 05/27/20  0558 05/28/20  0542   ALKPHOS 65  --   --    ALT 11  --   --    AST 22  --   --    PROT 6.0*  --   --    BILITOT 0.4  --   --    LABALBU 3.5 3.4* 2.8*       Magnesium:    Recent Labs     05/27/20  0558 05/28/20  0542   MG 2.1 2.0       PT/INR:    Recent Labs     05/27/20  0558 05/27/20  1333 05/28/20  0542   PROTIME 14.0 13.8 13.9   INR 1.1 1.1 1.1       Lipids:  No results for input(s): CHOL, TRIG, HDL, LDLCALC, LABVLDL in the last 72 hours. Active Problems:    Closed [facility]teric [facility] of hip, [facility]    Closed head injury  Resolved Problems:    * No resolved hospital problems.  *           Electronically signed by RON Silva CNP on 5/28/2020 at 8:45 AM

## 2020-05-28 NOTE — PROGRESS NOTES
INPATIENT PROGRESS NOTES    PATIENT NAME: Emerson Bush  MRN: [id number]  SERVICE DATE:  May 28, 2020   SERVICE TIME:  1:54 PM      PRIMARY SERVICE: Pulmonary Disease    CHIEF COMPLAINTS: Cough    INTERVAL HPI: Patient seen and examined at bedside, Interval Notes, orders reviewed. Nursing notes noted    Feels worse today, shortness of breath is worse, she did have some wheezing earlier today, no cough, no chest pain, no fever, no nausea no vomiting. Review of system:     GI Abdominal pain No  Skin Rash No    Social History     Tobacco Use    Smoking status: Former Smoker     Types: Cigarettes    Smokeless tobacco: Never Used   Substance Use Topics    Alcohol use: Not Currently     History reviewed. No pertinent family history. OBJECTIVE    Body mass index is 26.63 kg/m². PHYSICAL EXAM:  Vitals:  BP (!) 113/44   Pulse 60   Temp 97.5 °F (36.4 °C) (Oral)   Resp 16   Ht 5' 5\" (1.651 m)   Wt 160 lb (72.6 kg)   LMP  (LMP Unknown)   SpO2 99%   BMI 26.63 kg/m²     General: alert, cooperative, no distress  Head: normocephalic, atraumatic  Eyes:No gross abnormalities. and PERRL  ENT:  MMM no lesions  Neck:  supple and no masses  Chest : Good air movement, no wheezing, minimal rales at the bases, nontender, tympanic  Heart[de-identified] Heart sounds are normal.  Regular rate and rhythm without murmur, gallop or rub. ABD:  symmetric, soft, non-tender  Musculoskeletal : no cyanosis, no clubbing and   trace edema  Neuro:  Grossly normal  Skin: No rashes or nodules noted.   Lymph node:  no cervical nodes  Urology: No Ortega   Psychiatric: appropriate    DATA:   Recent Labs     05/27/20  0558 05/28/20  0542   WBC 8.1 9.0   HGB 9.2* 7.4*   HCT 27.6* 22.2*   MCV 80.3* 80.7*    132     Recent Labs     05/25/20  1615  05/27/20  0558 05/28/20  0542   *   < > 132* 129*   K 3.9   < > 3.8 4.9  4.9   CL 90*   < > 94* 95   CO2 29   < > 30 29   BUN 15   < > 5* 9   CREATININE 0.79   < > 0.39* 0.50   GLUCOSE 126*   < > Pneumothoraces. NO ACUTE ACTIVE CARDIOPULMONARY PROCESS. RADIOGRAPHIC FINDINGS OF COPD    Xr Chest (single View Frontal)    Result Date: 5/26/2020  XR CHEST (SINGLE VIEW FRONTAL) Exam Date/Time:  5/25/2020 5:15 PM Clinical History:   PAIN    Fall . Comparison:  11/5/2011  RESULT: Lines, tubes, and devices:  Left transvenous pacemaker with leads in the region of the right atrium and right ventricle, unchanged. Lungs and pleura:  Emphysematous changes. No focal consolidation. No pleural effusion. No pneumothorax. Normal pulmonary vascular pattern. Cardiomediastinal silhouette:  Normal. Aortic atherosclerotic calcification. Other:  No acute osseous findings. Surgical clips right upper quadrant. No acute radiographic abnormality or significant interval change. Emphysema    Xr Femur Left (min 2 Views)    Result Date: 5/26/2020  EXAMINATION:  XR FEMUR LEFT (MIN 2 VIEWS), XR HIP 2-3 VW W PELVIS LEFT HISTORY:   PAIN    Fall . Tripped over nebulizer tubing, fell on left hip. Left hip pain. TECHNIQUE:  XR FEMUR LEFT (MIN 2 VIEWS), XR HIP 2-3 VW W PELVIS LEFT COMPARISON: Correlation with abdominal radiographs from 3/1/2012. RESULT: Acute complex left proximal femur intertrochanteric fracture, with fracture line extending to involve both the greater and lesser trochanters, with multiple small fracture fragments at the fracture sites and displacement measuring up to 15 mm. Alignment of left hip appears maintained. Degenerative changes lower lumbar spine and SI joints. Right hip unremarkable. No other fracture in the distal femur on the dedicated left femur radiographs. Alignment is grossly maintained. No other significant abnormality. ---------------------------------------------     Left femur complex intertrochanteric fracture.     Ct Head Wo Contrast    Result Date: 5/25/2020  EXAM: CT SCAN OF THE BRAIN WITHOUT CONTRAST COMPARISON: 5/3/2013

## 2020-05-28 NOTE — DISCHARGE INSTR - COC
5\" (1.651 m)   Wt 160 lb (72.6 kg)   LMP  (LMP Unknown)   SpO2 99%   BMI 26.63 kg/m²     Last documented pain score (0-10 scale): Pain Level: 2  Last Weight:   Wt Readings from Last 1 Encounters:   20 160 lb (72.6 kg)     Mental Status:  {IP PT MENTAL STATUS:00971}    IV Access:  { ISSAC IV ACCESS:788754777}    Nursing Mobility/ADLs:  Walking   {CHP DME ICYM:812680708}  Transfer  {CHP DME EKSQ:402428544}  Bathing  {CHP DME JLME:972627346}  Dressing  {CHP DME VSPJ:597130546}  Toileting  {CHP DME BYVD:266864197}  Feeding  {CHP DME CFWH:412614394}  Med Admin  {CHP DME KTRN:399445559}  Med Delivery   { ISSAC MED Delivery:113791479}    Wound Care Documentation and Therapy:        Elimination:  Continence:   · Bowel: {YES / VL:88609}  · Bladder: {YES / LI:39956}  Urinary Catheter: {Urinary Catheter:423953955}   Colostomy/Ileostomy/Ileal Conduit: {YES / XI:99482}       Date of Last BM: ***    Intake/Output Summary (Last 24 hours) at 2020 1644  Last data filed at 2020 0645  Gross per 24 hour   Intake 1647 ml   Output 950 ml   Net 697 ml     I/O last 3 completed shifts:   In: 1624 [P.O.:960; I.V.:587; IV Piggyback:100]  Out: 950 [Urine:950]    Safety Concerns:     [address] [employer] Safety Concerns:852328484}    Impairments/Disabilities:      [address] [employer] Impairments/Disabilities:832921208}    Nutrition Therapy:  Current Nutrition Therapy:   [address] [employer] Diet List:931850593}    Routes of Feeding: {CHP DME Other Feedings:818385677}  Liquids: {Slp liquid thickness:07247}  Daily Fluid Restriction: {CHP DME Yes amt example:922749410}  Last Modified Barium Swallow with Video (Video Swallowing Test): {Done Not Done IBGK:506180918}    Treatments at the Time of Hospital Discharge:   Respiratory Treatments: ***  Oxygen Therapy:  {Therapy; copd oxygen:21508}  Ventilator:    {EDEN GARCIA Vent SJQQ:353162509}    Rehab Therapies: {THERAPEUTIC INTERVENTION:8350827756}  Weight Bearing Status/Restrictions: {EDEN GARCIA Weight Bearin}  Other

## 2020-05-28 NOTE — PROGRESS NOTES
requires verbal cues or instructions from another person, close to but not touching, to perform the activity  Minimal assistance= pt performs 75% or more of the activity; assistance is required to complete the activity  Moderate assistance= pt performs 50% of the activity; assistance is required to complete the activity  Maximal assistance = pt performs 25% of the activity; assistance is required to complete the activity  Dependent = pt requires total physical assistance to accomplish the task

## 2020-05-28 NOTE — PROGRESS NOTES
CL 92* 94* 95   CO2 30 30 29   BUN 10 5* 9   CREATININE 0.59 0.39* 0.50   CALCIUM 8.9 9.0 8.7   PHOS  --  2.5 2.6     Recent Labs     05/25/20  1615   AST 22   ALT 11   BILITOT 0.4   ALKPHOS 65     Recent Labs     05/27/20  0558 05/27/20  1333 05/28/20  0542   INR 1.1 1.1 1.1     No results for input(s): Reyna Newer in the last 72 hours. Urinalysis:      Lab Results   Component Value Date    NITRU Negative 05/25/2020    WBCUA 3-5 05/25/2020    BACTERIA Negative 05/25/2020    RBCUA 3-5 05/25/2020    BLOODU Negative 05/25/2020    SPECGRAV 1.010 05/25/2020    GLUCOSEU Negative 05/25/2020    GLUCOSEU NEG 11/06/2011       Radiology:  FLUORO FOR SURGICAL PROCEDURES   Final Result   FLUOROSCOPIC INTRAOPERATIVE IMAGING ASSISTANCE FOR LEFT HIP TFN ORIF         XR CHEST (SINGLE VIEW FRONTAL)   Final Result   NO ACUTE ACTIVE CARDIOPULMONARY PROCESS. RADIOGRAPHIC FINDINGS OF COPD      XR HIP 2-3 VW W PELVIS LEFT   Final Result      Left femur complex intertrochanteric fracture. XR FEMUR LEFT (MIN 2 VIEWS)   Final Result      Left femur complex intertrochanteric fracture. XR CHEST (SINGLE VIEW FRONTAL)   Final Result      No acute radiographic abnormality or significant interval change. Emphysema      CT HEAD WO CONTRAST   Final Result   1. CEREBRAL ATROPHY AND AGE RELATED FINDINGS IN THE BRAIN. 2. NO ACUTE INTRA-AXIAL OR EXTRA-AXIAL FINDINGS IN THE BRAIN. All CT scans at this facility use dose modulation, iterative reconstruction, and/or weight based dosing when appropriate to reduce radiation dose to as low as reasonably achievable. CT CERVICAL SPINE WO CONTRAST   Final Result   1. DEGENERATIVE AND ARTHRITIC CHANGES THROUGHOUT THE C-SPINE AS DESCRIBED. 2.  NO ACUTE FRACTURE, SUBLUXATION OR DISLOCATION INVOLVING THE CERVICAL SPINE.       All CT scans at this facility use dose modulation, iterative reconstruction, and/or weight based dosing when appropriate to reduce radiation dose to as low as reasonably achievable.                  Assessment/Plan:    80 y/o female with history of AFIB on Warfarin, SSS s/p PPM, HTN, SLE, COPD on home O2, GONZALEZ who presented with :     Acute left intertrochanteric femur fracture s/p fall  - s/p repair   - management per Ortho    Hyponatremia  - holding HCTZ and furosemide  - monitor Na level    Acute blood loss anemia  - 1 unit of PRBCs will be transfused today per ortho  - follow H/H    AFIB, SSS s/p PPM  - warfarin resumed  - on Sotalol  - follow INR  - cardiology following    COPD exacerbation/ Chronic respiratory failure with hypoxia  - started on Prednisone and Symbicort  - pulmonology following    SLE  - continue plaquenil        Electronically signed by Shahida Mo MD on 5/28/2020 at 11:25 AM

## 2020-05-28 NOTE — PROGRESS NOTES
Moderate assistance    Bed Mobility  Bed mobility  Supine to Sit: Moderate assistance    Seated and Standing Balance:  Balance  Sitting Balance: Supervision  Standing Balance: Minimal assistance    Functional Endurance:  Activity Tolerance  Activity Tolerance: Patient Tolerated treatment well  Activity Tolerance: Fair-    D/C Recommendations:  OT D/C RECOMMENDATIONS  REQUIRES OT FOLLOW UP: Yes    Equipment Recommendations:       OT Education:        OT Follow Up:  OT D/C RECOMMENDATIONS  REQUIRES OT FOLLOW UP: Yes       Assessment/Discharge Disposition:     Performance deficits / Impairments: Decreased functional mobility , Decreased strength, Decreased endurance, Decreased ADL status, Decreased high-level IADLs, Decreased balance  Prognosis: Good  Discharge Recommendations: Continue to assess pending progress  Decision Making: Medium Complexity  History: 3 complexities  Exam: 6 deficits  Assistance / Modification: Max A    Six Click Score    How much help for putting on and taking off regular lower body clothing?: Total  How much help for Bathing?: A Lot  How much help for Toileting?: A Little  How much help for putting on and taking off regular upper body clothing?: None  How much help for taking care of personal grooming?: None  How much help for eating meals?: None  AM-[facility] Inpatient Daily Activity Raw Score: 18  AM-PAC Inpatient ADL T-Scale Score : 38.66  ADL Inpatient CMS 0-100% Score: 46.65    Plan:  Plan  Times per week: 1-4x/wk  Current Treatment Recommendations: Strengthening, Functional Mobility Training, Endurance Training, Balance Training, Neuromuscular Re-education, Self-Care / ADL, Equipment Evaluation, Education, & procurement, Safety Education & Training    Goals:   Patient will:    - Improve functional endurance to tolerate/complete 10-15 mins of ADL's  - Be independent in UB ADLs   - Be MIn A in LB ADLs  - Be SBA in ADL transfers without LOB  - Be SBA in toileting tasks  - Improve bilateral UE

## 2020-05-29 ENCOUNTER — HOSPITAL ENCOUNTER (INPATIENT)
Age: 85
LOS: 12 days | Discharge: HOME HEALTH | DRG: 560 | End: 2020-06-10
Attending: PHYSICAL MEDICINE & REHABILITATION | Admitting: PHYSICAL MEDICINE & REHABILITATION
Payer: MEDICARE

## 2020-05-29 VITALS
DIASTOLIC BLOOD PRESSURE: 55 MMHG | TEMPERATURE: 97.9 F | HEIGHT: 65 IN | BODY MASS INDEX: 26.66 KG/M2 | OXYGEN SATURATION: 100 % | SYSTOLIC BLOOD PRESSURE: 139 MMHG | HEART RATE: 64 BPM | WEIGHT: 160 LBS | RESPIRATION RATE: 16 BRPM

## 2020-05-29 PROBLEM — I49.5 SICK SINUS SYNDROME (HCC): Status: ACTIVE | Noted: 2018-03-01

## 2020-05-29 PROBLEM — I10 ESSENTIAL HYPERTENSION: Status: ACTIVE | Noted: 2018-03-01

## 2020-05-29 PROBLEM — M80.00XD AGE-RELATED OSTEOPOROSIS WITH CURRENT PATHOLOGICAL FRACTURE WITH ROUTINE HEALING: Status: ACTIVE | Noted: 2020-05-29

## 2020-05-29 PROBLEM — L30.9 ECZEMA: Status: ACTIVE | Noted: 2017-01-11

## 2020-05-29 PROBLEM — I48.91 ATRIAL FIBRILLATION (HCC): Status: ACTIVE | Noted: 2020-05-29

## 2020-05-29 PROBLEM — J96.90 RESPIRATORY FAILURE (HCC): Status: ACTIVE | Noted: 2018-03-01

## 2020-05-29 PROBLEM — R26.9 GAIT ABNORMALITY: Status: ACTIVE | Noted: 2020-05-29

## 2020-05-29 PROBLEM — Z72.0 TOBACCO USER: Status: ACTIVE | Noted: 2020-05-29

## 2020-05-29 PROBLEM — E78.5 HYPERLIPIDEMIA, UNSPECIFIED: Status: ACTIVE | Noted: 2018-03-01

## 2020-05-29 PROBLEM — E87.1 HYPONATREMIA: Status: ACTIVE | Noted: 2020-05-29

## 2020-05-29 PROBLEM — J44.9 CHRONIC OBSTRUCTIVE PULMONARY DISEASE, UNSPECIFIED (HCC): Status: ACTIVE | Noted: 2018-10-30

## 2020-05-29 PROBLEM — Z85.828 HISTORY OF BASAL CELL CARCINOMA: Status: ACTIVE | Noted: 2018-01-18

## 2020-05-29 PROBLEM — Z95.0 PACEMAKER: Status: ACTIVE | Noted: 2020-05-29

## 2020-05-29 PROBLEM — G47.33 OBSTRUCTIVE SLEEP APNEA (ADULT) (PEDIATRIC): Status: ACTIVE | Noted: 2018-10-30

## 2020-05-29 PROBLEM — E87.5 HYPERKALEMIA: Status: ACTIVE | Noted: 2020-05-29

## 2020-05-29 PROBLEM — N20.1 CALCULUS OF URETER: Status: ACTIVE | Noted: 2020-05-29

## 2020-05-29 PROBLEM — R05.9 COUGH: Status: ACTIVE | Noted: 2018-03-01

## 2020-05-29 PROBLEM — K21.00 REFLUX ESOPHAGITIS: Status: ACTIVE | Noted: 2020-05-29

## 2020-05-29 PROBLEM — I25.10 CORONARY ATHEROSCLEROSIS: Status: ACTIVE | Noted: 2020-05-29

## 2020-05-29 PROBLEM — E55.9 VITAMIN D DEFICIENCY: Status: ACTIVE | Noted: 2020-05-29

## 2020-05-29 LAB
ALBUMIN SERPL-MCNC: 3.3 G/DL (ref 3.5–4.6)
ANION GAP SERPL CALCULATED.3IONS-SCNC: 9 MEQ/L (ref 9–15)
BLOOD BANK DISPENSE STATUS: NORMAL
BLOOD BANK DISPENSE STATUS: NORMAL
BLOOD BANK PRODUCT CODE: NORMAL
BLOOD BANK PRODUCT CODE: NORMAL
BPU ID: NORMAL
BPU ID: NORMAL
BUN BLDV-MCNC: 11 MG/DL (ref 8–23)
CALCIUM SERPL-MCNC: 9.2 MG/DL (ref 8.5–9.9)
CHLORIDE BLD-SCNC: 97 MEQ/L (ref 95–107)
CO2: 27 MEQ/L (ref 20–31)
CREAT SERPL-MCNC: 0.53 MG/DL (ref 0.5–0.9)
DESCRIPTION BLOOD BANK: NORMAL
DESCRIPTION BLOOD BANK: NORMAL
GFR AFRICAN AMERICAN: >60
GFR NON-AFRICAN AMERICAN: >60
GLUCOSE BLD-MCNC: 130 MG/DL (ref 70–99)
HCT VFR BLD CALC: 27.9 % (ref 37–47)
HEMOGLOBIN: 9.5 G/DL (ref 12–16)
INR BLD: 0.9
MAGNESIUM: 2.1 MG/DL (ref 1.7–2.4)
MCH RBC QN AUTO: 27.7 PG (ref 27–31.3)
MCHC RBC AUTO-ENTMCNC: 34 % (ref 33–37)
MCV RBC AUTO: 81.6 FL (ref 82–100)
PDW BLD-RTO: 15.7 % (ref 11.5–14.5)
PHOSPHORUS: 3.1 MG/DL (ref 2.3–4.8)
PLATELET # BLD: 153 K/UL (ref 130–400)
POTASSIUM SERPL-SCNC: 5.2 MEQ/L (ref 3.4–4.9)
PROTHROMBIN TIME: 12.7 SEC (ref 12.3–14.9)
RBC # BLD: 3.42 M/UL (ref 4.2–5.4)
SODIUM BLD-SCNC: 133 MEQ/L (ref 135–144)
WBC # BLD: 7.7 K/UL (ref 4.8–10.8)

## 2020-05-29 PROCEDURE — 6370000000 HC RX 637 (ALT 250 FOR IP): Performed by: ORTHOPAEDIC SURGERY

## 2020-05-29 PROCEDURE — 85610 PROTHROMBIN TIME: CPT

## 2020-05-29 PROCEDURE — 6370000000 HC RX 637 (ALT 250 FOR IP): Performed by: INTERNAL MEDICINE

## 2020-05-29 PROCEDURE — 1180000000 HC REHAB R&B

## 2020-05-29 PROCEDURE — 80069 RENAL FUNCTION PANEL: CPT

## 2020-05-29 PROCEDURE — 99232 SBSQ HOSP IP/OBS MODERATE 35: CPT | Performed by: INTERNAL MEDICINE

## 2020-05-29 PROCEDURE — 97116 GAIT TRAINING THERAPY: CPT

## 2020-05-29 PROCEDURE — 94640 AIRWAY INHALATION TREATMENT: CPT

## 2020-05-29 PROCEDURE — 94761 N-INVAS EAR/PLS OXIMETRY MLT: CPT

## 2020-05-29 PROCEDURE — 85027 COMPLETE CBC AUTOMATED: CPT

## 2020-05-29 PROCEDURE — 2700000000 HC OXYGEN THERAPY PER DAY

## 2020-05-29 PROCEDURE — 83735 ASSAY OF MAGNESIUM: CPT

## 2020-05-29 PROCEDURE — 2580000003 HC RX 258: Performed by: ORTHOPAEDIC SURGERY

## 2020-05-29 PROCEDURE — 36415 COLL VENOUS BLD VENIPUNCTURE: CPT

## 2020-05-29 PROCEDURE — 6370000000 HC RX 637 (ALT 250 FOR IP): Performed by: NURSE PRACTITIONER

## 2020-05-29 PROCEDURE — 99222 1ST HOSP IP/OBS MODERATE 55: CPT | Performed by: PHYSICAL MEDICINE & REHABILITATION

## 2020-05-29 RX ORDER — METHOCARBAMOL 500 MG/1
500 TABLET, FILM COATED ORAL 4 TIMES DAILY PRN
Status: DISCONTINUED | OUTPATIENT
Start: 2020-05-29 | End: 2020-06-10 | Stop reason: HOSPADM

## 2020-05-29 RX ORDER — PANTOPRAZOLE SODIUM 40 MG/1
40 TABLET, DELAYED RELEASE ORAL
Status: CANCELLED | OUTPATIENT
Start: 2020-05-30

## 2020-05-29 RX ORDER — WARFARIN SODIUM 5 MG/1
5 TABLET ORAL DAILY
Status: CANCELLED | OUTPATIENT
Start: 2020-05-29

## 2020-05-29 RX ORDER — PANTOPRAZOLE SODIUM 40 MG/1
40 TABLET, DELAYED RELEASE ORAL
Status: DISCONTINUED | OUTPATIENT
Start: 2020-05-30 | End: 2020-05-30

## 2020-05-29 RX ORDER — HYDROXYCHLOROQUINE SULFATE 200 MG/1
100 TABLET, FILM COATED ORAL 2 TIMES DAILY
Status: CANCELLED | OUTPATIENT
Start: 2020-05-29

## 2020-05-29 RX ORDER — MONTELUKAST SODIUM 10 MG/1
10 TABLET ORAL NIGHTLY
Status: CANCELLED | OUTPATIENT
Start: 2020-05-29

## 2020-05-29 RX ORDER — SOTALOL HYDROCHLORIDE 120 MG/1
120 TABLET ORAL 2 TIMES DAILY
Status: DISCONTINUED | OUTPATIENT
Start: 2020-05-29 | End: 2020-06-10 | Stop reason: HOSPADM

## 2020-05-29 RX ORDER — MONTELUKAST SODIUM 10 MG/1
10 TABLET ORAL NIGHTLY
Status: DISCONTINUED | OUTPATIENT
Start: 2020-05-29 | End: 2020-06-10 | Stop reason: HOSPADM

## 2020-05-29 RX ORDER — HYDROCODONE BITARTRATE AND ACETAMINOPHEN 5; 325 MG/1; MG/1
0.5 TABLET ORAL EVERY 4 HOURS PRN
Status: DISCONTINUED | OUTPATIENT
Start: 2020-05-29 | End: 2020-06-10 | Stop reason: HOSPADM

## 2020-05-29 RX ORDER — ACETAMINOPHEN 325 MG/1
650 TABLET ORAL EVERY 6 HOURS
Status: CANCELLED | OUTPATIENT
Start: 2020-05-29

## 2020-05-29 RX ORDER — LANOLIN ALCOHOL/MO/W.PET/CERES
400 CREAM (GRAM) TOPICAL DAILY
Status: DISCONTINUED | OUTPATIENT
Start: 2020-05-30 | End: 2020-06-04

## 2020-05-29 RX ORDER — BUDESONIDE AND FORMOTEROL FUMARATE DIHYDRATE 80; 4.5 UG/1; UG/1
2 AEROSOL RESPIRATORY (INHALATION) 2 TIMES DAILY
Status: DISCONTINUED | OUTPATIENT
Start: 2020-05-29 | End: 2020-05-30

## 2020-05-29 RX ORDER — HYDROCODONE BITARTRATE AND ACETAMINOPHEN 5; 325 MG/1; MG/1
1 TABLET ORAL EVERY 4 HOURS PRN
Status: CANCELLED | OUTPATIENT
Start: 2020-05-29

## 2020-05-29 RX ORDER — HYDROCODONE BITARTRATE AND ACETAMINOPHEN 10; 325 MG/1; MG/1
1 TABLET ORAL EVERY 4 HOURS PRN
Status: CANCELLED | OUTPATIENT
Start: 2020-05-29

## 2020-05-29 RX ORDER — ALBUTEROL SULFATE 90 UG/1
2 AEROSOL, METERED RESPIRATORY (INHALATION) EVERY 4 HOURS PRN
Status: DISCONTINUED | OUTPATIENT
Start: 2020-05-29 | End: 2020-06-10 | Stop reason: HOSPADM

## 2020-05-29 RX ORDER — AMOXICILLIN 250 MG
2 CAPSULE ORAL 2 TIMES DAILY
Status: DISCONTINUED | OUTPATIENT
Start: 2020-05-29 | End: 2020-06-01

## 2020-05-29 RX ORDER — HYDROXYCHLOROQUINE SULFATE 200 MG/1
100 TABLET, FILM COATED ORAL 2 TIMES DAILY
Status: DISCONTINUED | OUTPATIENT
Start: 2020-05-29 | End: 2020-06-10 | Stop reason: HOSPADM

## 2020-05-29 RX ORDER — ACETAMINOPHEN 80 MG
TABLET,CHEWABLE ORAL ONCE
Status: COMPLETED | OUTPATIENT
Start: 2020-05-29 | End: 2020-05-29

## 2020-05-29 RX ORDER — FUROSEMIDE 20 MG/1
20 TABLET ORAL 2 TIMES DAILY
Status: CANCELLED | OUTPATIENT
Start: 2020-05-29

## 2020-05-29 RX ORDER — LANOLIN ALCOHOL/MO/W.PET/CERES
400 CREAM (GRAM) TOPICAL DAILY
Status: CANCELLED | OUTPATIENT
Start: 2020-05-30

## 2020-05-29 RX ORDER — PREDNISONE 20 MG/1
40 TABLET ORAL DAILY
Status: CANCELLED | OUTPATIENT
Start: 2020-05-30 | End: 2020-06-02

## 2020-05-29 RX ORDER — POTASSIUM CHLORIDE 20 MEQ/1
20 TABLET, EXTENDED RELEASE ORAL 2 TIMES DAILY
Status: CANCELLED | OUTPATIENT
Start: 2020-05-29

## 2020-05-29 RX ORDER — HYDROCODONE BITARTRATE AND ACETAMINOPHEN 5; 325 MG/1; MG/1
0.5 TABLET ORAL EVERY 4 HOURS PRN
Status: CANCELLED | OUTPATIENT
Start: 2020-05-29

## 2020-05-29 RX ORDER — BUDESONIDE AND FORMOTEROL FUMARATE DIHYDRATE 80; 4.5 UG/1; UG/1
2 AEROSOL RESPIRATORY (INHALATION) 2 TIMES DAILY
Status: CANCELLED | OUTPATIENT
Start: 2020-05-29

## 2020-05-29 RX ORDER — WARFARIN SODIUM 5 MG/1
5 TABLET ORAL DAILY
Status: DISCONTINUED | OUTPATIENT
Start: 2020-05-29 | End: 2020-06-06

## 2020-05-29 RX ORDER — LISINOPRIL 20 MG/1
20 TABLET ORAL NIGHTLY
Status: DISCONTINUED | OUTPATIENT
Start: 2020-05-29 | End: 2020-06-03

## 2020-05-29 RX ORDER — AMOXICILLIN 250 MG
2 CAPSULE ORAL 2 TIMES DAILY
Status: CANCELLED | OUTPATIENT
Start: 2020-05-29

## 2020-05-29 RX ORDER — SOTALOL HYDROCHLORIDE 120 MG/1
120 TABLET ORAL 2 TIMES DAILY
Status: CANCELLED | OUTPATIENT
Start: 2020-05-29

## 2020-05-29 RX ORDER — METHOCARBAMOL 500 MG/1
500 TABLET, FILM COATED ORAL 4 TIMES DAILY PRN
Status: CANCELLED | OUTPATIENT
Start: 2020-05-29

## 2020-05-29 RX ORDER — PREDNISONE 20 MG/1
40 TABLET ORAL DAILY
Status: COMPLETED | OUTPATIENT
Start: 2020-05-30 | End: 2020-06-01

## 2020-05-29 RX ORDER — ALBUTEROL SULFATE 90 UG/1
2 AEROSOL, METERED RESPIRATORY (INHALATION) EVERY 4 HOURS PRN
Status: CANCELLED | OUTPATIENT
Start: 2020-05-29

## 2020-05-29 RX ORDER — HYDROCODONE BITARTRATE AND ACETAMINOPHEN 5; 325 MG/1; MG/1
1 TABLET ORAL EVERY 4 HOURS PRN
Status: DISCONTINUED | OUTPATIENT
Start: 2020-05-29 | End: 2020-06-10 | Stop reason: HOSPADM

## 2020-05-29 RX ORDER — LISINOPRIL 20 MG/1
20 TABLET ORAL NIGHTLY
Status: CANCELLED | OUTPATIENT
Start: 2020-05-29

## 2020-05-29 RX ORDER — ACETAMINOPHEN 325 MG/1
650 TABLET ORAL EVERY 6 HOURS
Status: DISCONTINUED | OUTPATIENT
Start: 2020-05-29 | End: 2020-06-10 | Stop reason: HOSPADM

## 2020-05-29 RX ORDER — HYDROCODONE BITARTRATE AND ACETAMINOPHEN 10; 325 MG/1; MG/1
1 TABLET ORAL EVERY 4 HOURS PRN
Status: DISCONTINUED | OUTPATIENT
Start: 2020-05-29 | End: 2020-06-10

## 2020-05-29 RX ADMIN — HYDROXYCHLOROQUINE SULFATE 100 MG: 200 TABLET, FILM COATED ORAL at 10:41

## 2020-05-29 RX ADMIN — POTASSIUM CHLORIDE 20 MEQ: 20 TABLET, EXTENDED RELEASE ORAL at 10:41

## 2020-05-29 RX ADMIN — WARFARIN SODIUM 5 MG: 5 TABLET ORAL at 20:57

## 2020-05-29 RX ADMIN — Medication 400 MG: at 10:42

## 2020-05-29 RX ADMIN — LISINOPRIL 20 MG: 20 TABLET ORAL at 20:57

## 2020-05-29 RX ADMIN — MONTELUKAST 10 MG: 10 TABLET, FILM COATED ORAL at 20:57

## 2020-05-29 RX ADMIN — ALBUTEROL SULFATE 2 PUFF: 90 AEROSOL, METERED RESPIRATORY (INHALATION) at 09:23

## 2020-05-29 RX ADMIN — DOCUSATE SODIUM 50MG AND SENNOSIDES 8.6MG 2 TABLET: 8.6; 5 TABLET, FILM COATED ORAL at 20:57

## 2020-05-29 RX ADMIN — Medication 10 ML: at 10:43

## 2020-05-29 RX ADMIN — PREDNISONE 40 MG: 20 TABLET ORAL at 10:39

## 2020-05-29 RX ADMIN — SOTALOL HYDROCHLORIDE 120 MG: 120 TABLET ORAL at 20:57

## 2020-05-29 RX ADMIN — SOTALOL HYDROCHLORIDE 120 MG: 120 TABLET ORAL at 10:40

## 2020-05-29 RX ADMIN — ACETAMINOPHEN 650 MG: 325 TABLET ORAL at 20:56

## 2020-05-29 RX ADMIN — TIOTROPIUM BROMIDE INHALATION SPRAY 2 PUFF: 3.12 SPRAY, METERED RESPIRATORY (INHALATION) at 09:15

## 2020-05-29 RX ADMIN — HYDROXYCHLOROQUINE SULFATE 100 MG: 200 TABLET, FILM COATED ORAL at 20:58

## 2020-05-29 RX ADMIN — DOCUSATE SODIUM 50MG AND SENNOSIDES 8.6MG 2 TABLET: 8.6; 5 TABLET, FILM COATED ORAL at 10:40

## 2020-05-29 RX ADMIN — MAGNESIUM HYDROXIDE 30 ML: 400 SUSPENSION ORAL at 01:02

## 2020-05-29 RX ADMIN — PANTOPRAZOLE SODIUM 40 MG: 40 TABLET, DELAYED RELEASE ORAL at 05:34

## 2020-05-29 RX ADMIN — Medication: at 20:58

## 2020-05-29 RX ADMIN — ACETAMINOPHEN 650 MG: 325 TABLET, FILM COATED ORAL at 10:39

## 2020-05-29 RX ADMIN — ACETAMINOPHEN 650 MG: 325 TABLET, FILM COATED ORAL at 15:31

## 2020-05-29 RX ADMIN — FUROSEMIDE 20 MG: 20 TABLET ORAL at 12:39

## 2020-05-29 RX ADMIN — ACETAMINOPHEN 650 MG: 325 TABLET, FILM COATED ORAL at 05:34

## 2020-05-29 RX ADMIN — BUDESONIDE AND FORMOTEROL FUMARATE DIHYDRATE 2 PUFF: 80; 4.5 AEROSOL RESPIRATORY (INHALATION) at 09:15

## 2020-05-29 SDOH — ECONOMIC STABILITY: TRANSPORTATION INSECURITY
IN THE PAST 12 MONTHS, HAS LACK OF TRANSPORTATION KEPT YOU FROM MEETINGS, WORK, OR FROM GETTING THINGS NEEDED FOR DAILY LIVING?: NO

## 2020-05-29 SDOH — SOCIAL STABILITY: SOCIAL INSECURITY: WITHIN THE LAST YEAR, HAVE YOU BEEN AFRAID OF YOUR PARTNER OR EX-PARTNER?: NO

## 2020-05-29 SDOH — SOCIAL STABILITY: SOCIAL INSECURITY
WITHIN THE LAST YEAR, HAVE TO BEEN RAPED OR FORCED TO HAVE ANY KIND OF SEXUAL ACTIVITY BY YOUR PARTNER OR EX-PARTNER?: NO

## 2020-05-29 SDOH — SOCIAL STABILITY: SOCIAL NETWORK
IN A TYPICAL WEEK, HOW MANY TIMES DO YOU TALK ON THE PHONE WITH FAMILY, FRIENDS, OR NEIGHBORS?: MORE THAN THREE TIMES A WEEK

## 2020-05-29 SDOH — SOCIAL STABILITY: SOCIAL NETWORK: ARE YOU MARRIED, WIDOWED, DIVORCED, SEPARATED, NEVER MARRIED, OR LIVING WITH A PARTNER?: WIDOWED

## 2020-05-29 SDOH — HEALTH STABILITY: MENTAL HEALTH
STRESS IS WHEN SOMEONE FEELS TENSE, NERVOUS, ANXIOUS, OR CAN'T SLEEP AT NIGHT BECAUSE THEIR MIND IS TROUBLED. HOW STRESSED ARE YOU?: ONLY A LITTLE

## 2020-05-29 SDOH — SOCIAL STABILITY: SOCIAL NETWORK: HOW OFTEN DO YOU ATTEND CHURCH OR RELIGIOUS SERVICES?: 1 TO 4 TIMES PER YEAR

## 2020-05-29 SDOH — ECONOMIC STABILITY: INCOME INSECURITY: HOW HARD IS IT FOR YOU TO PAY FOR THE VERY BASICS LIKE FOOD, HOUSING, MEDICAL CARE, AND HEATING?: NOT HARD AT ALL

## 2020-05-29 SDOH — SOCIAL STABILITY: SOCIAL INSECURITY: WITHIN THE LAST YEAR, HAVE YOU BEEN HUMILIATED OR EMOTIONALLY ABUSED IN OTHER WAYS BY YOUR PARTNER OR EX-PARTNER?: NO

## 2020-05-29 SDOH — HEALTH STABILITY: PHYSICAL HEALTH: ON AVERAGE, HOW MANY MINUTES DO YOU ENGAGE IN EXERCISE AT THIS LEVEL?: 0 MIN

## 2020-05-29 SDOH — ECONOMIC STABILITY: FOOD INSECURITY: WITHIN THE PAST 12 MONTHS, YOU WORRIED THAT YOUR FOOD WOULD RUN OUT BEFORE YOU GOT MONEY TO BUY MORE.: NEVER TRUE

## 2020-05-29 SDOH — ECONOMIC STABILITY: TRANSPORTATION INSECURITY
IN THE PAST 12 MONTHS, HAS THE LACK OF TRANSPORTATION KEPT YOU FROM MEDICAL APPOINTMENTS OR FROM GETTING MEDICATIONS?: NO

## 2020-05-29 SDOH — SOCIAL STABILITY: SOCIAL NETWORK: HOW OFTEN DO YOU GET TOGETHER WITH FRIENDS OR RELATIVES?: ONCE A WEEK

## 2020-05-29 SDOH — HEALTH STABILITY: PHYSICAL HEALTH: ON AVERAGE, HOW MANY DAYS PER WEEK DO YOU ENGAGE IN MODERATE TO STRENUOUS EXERCISE (LIKE A BRISK WALK)?: 0 DAYS

## 2020-05-29 SDOH — ECONOMIC STABILITY: FOOD INSECURITY: WITHIN THE PAST 12 MONTHS, THE FOOD YOU BOUGHT JUST DIDN'T LAST AND YOU DIDN'T HAVE MONEY TO GET MORE.: NEVER TRUE

## 2020-05-29 SDOH — SOCIAL STABILITY: SOCIAL NETWORK
DO YOU BELONG TO ANY CLUBS OR ORGANIZATIONS SUCH AS CHURCH GROUPS UNIONS, FRATERNAL OR ATHLETIC GROUPS, OR SCHOOL GROUPS?: NO

## 2020-05-29 SDOH — SOCIAL STABILITY: SOCIAL NETWORK: HOW OFTEN DO YOU ATTENT MEETINGS OF THE CLUB OR ORGANIZATION YOU BELONG TO?: NEVER

## 2020-05-29 SDOH — SOCIAL STABILITY: SOCIAL INSECURITY
WITHIN THE LAST YEAR, HAVE YOU BEEN KICKED, HIT, SLAPPED, OR OTHERWISE PHYSICALLY HURT BY YOUR PARTNER OR EX-PARTNER?: NO

## 2020-05-29 ASSESSMENT — PAIN SCALES - GENERAL
PAINLEVEL_OUTOF10: 4
PAINLEVEL_OUTOF10: 0
PAINLEVEL_OUTOF10: 4

## 2020-05-29 ASSESSMENT — ENCOUNTER SYMPTOMS
DIARRHEA: 0
VOMITING: 0
PHOTOPHOBIA: 0
CONSTIPATION: 1
SORE THROAT: 0
EYE PAIN: 0
WHEEZING: 0
COUGH: 0
ABDOMINAL PAIN: 0
BACK PAIN: 1
BLOOD IN STOOL: 0
SHORTNESS OF BREATH: 1
STRIDOR: 0
NAUSEA: 0
EYE REDNESS: 0

## 2020-05-29 ASSESSMENT — PAIN DESCRIPTION - PAIN TYPE: TYPE: ACUTE PAIN

## 2020-05-29 ASSESSMENT — PAIN DESCRIPTION - LOCATION: LOCATION: HIP

## 2020-05-29 ASSESSMENT — PAIN DESCRIPTION - ORIENTATION: ORIENTATION: LEFT

## 2020-05-29 NOTE — CONSULTS
Physical Medicine & Rehabilitation  Consult Note      Admitting Physician: Roger Thompson MD    Primary Care Provider: Collette Winn DO     Reason for Consult:  Asses rehab needs,promote physical and mental function, and decrease likelihood of re-admit to the hospital after discharge. History of Present Illness:    Lilliana Nielsen is a 81 y.o. female admitted to [facility] on 5/25/2020. Status Post left TFN-status post mechanical fall with left femoral fracture-tangled legs in oxygen cord--also has aerosols at home. Postop she is having difficulty with mobility and ADLs. INTRAMEDULLARY NAIL LEFT FEMUR (Left Hip) Diagnosis:  (INPATIENT)      Surgeon:  Roger Thompson MD Responsible Provider:  RON García - CRNA     Anesthesia Type:  general          Fall   The accident occurred 3 to 5 days ago. The fall occurred while walking. The point of impact was the head, left shoulder, left foot and left hip. The pain is at a severity of 10/10. The pain is severe. The symptoms are aggravated by cold and pressure on injury. Pertinent negatives include no abdominal pain, fever, headaches, hematuria, nausea or vomiting. She has tried NSAID, rest, immobilization, heat, elevation and acetaminophen for the symptoms. The treatment provided mild relief. Hip Pain    The incident occurred 3 to 5 days ago. The incident occurred at home. The injury mechanism was a fall. The pain is present in the left knee, left foot, left thigh and left hip. The quality of the pain is described as stabbing and aching. The pain is severe. The pain has been fluctuating since onset. Associated symptoms include an inability to bear weight and a loss of motion. Possible foreign bodies include metal (Status Post left TFN). The symptoms are aggravated by palpation, weight bearing and movement. She has tried elevation, ice and acetaminophen for the symptoms. The treatment provided mild relief. at the fracture sites and displacement measuring up to 15 mm. Alignment of left hip appears maintained. Degenerative changes lower lumbar spine and SI joints. Right hip unremarkable. No other fracture in the distal femur on the dedicated left femur radiographs. Alignment is grossly maintained. No other significant abnormality. ---------------------------------------------     Left femur complex intertrochanteric fracture. Labs:     labs reviewed and discussed with patient and staff    No results found for: POCGLU  Lab Results   Component Value Date     05/29/2020    K 5.2 05/29/2020    K 4.9 05/28/2020    CL 97 05/29/2020    CO2 27 05/29/2020    BUN 11 05/29/2020    CREATININE 0.53 05/29/2020    CALCIUM 9.2 05/29/2020    LABALBU 3.3 05/29/2020    LABALBU 3.6 11/06/2011    BILITOT 0.4 05/25/2020    ALKPHOS 65 05/25/2020    AST 22 05/25/2020    ALT 11 05/25/2020     Lab Results   Component Value Date    WBC 7.7 05/29/2020    RBC 3.42 05/29/2020    RBC 3.78 11/07/2011    HGB 9.5 05/29/2020    HCT 27.9 05/29/2020    MCV 81.6 05/29/2020    MCH 27.7 05/29/2020    MCHC 34.0 05/29/2020    RDW 15.7 05/29/2020     05/29/2020    MPV 9.0 05/05/2013     No results found for: VITD25  Lab Results   Component Value Date    APPEARANCE CLEAR 09/20/2012    COLORU Yellow 05/25/2020    NITRU Negative 05/25/2020    GLUCOSEU Negative 05/25/2020    GLUCOSEU NEG 11/06/2011    KETUA Negative 05/25/2020    UROBILINOGEN 1.0 05/25/2020    BILIRUBINUR Negative 05/25/2020    BILIRUBINUR NEG 11/06/2011     Lab Results   Component Value Date    PROTIME 12.7 05/29/2020    PROTIME 10.7 11/06/2011     Lab Results   Component Value Date    INR 0.9 05/29/2020         I discussed results with patient.     Current Rehabilitation Assessments:    Rehabilitation:  Physical therapy: FIMS:  BedMobility: Scooting: Maximal assistance    Transfers: Sit to Stand: Minimal Assistance  Stand to sit: Minimal Assistance  Bed to Chair: Minimal Family History:     History reviewed. No pertinent family history. Review of Systems:    Review of Systems   Constitutional: Positive for activity change and fatigue. Negative for chills, diaphoresis and fever. HENT: Negative for congestion, ear discharge, ear pain, hearing loss, nosebleeds, sore throat and tinnitus. Eyes: Negative for photophobia, pain and redness. Respiratory: Positive for shortness of breath. Negative for cough, wheezing and stridor. Shortness of breath on exertion   Cardiovascular: Negative for chest pain, palpitations and leg swelling. Gastrointestinal: Positive for constipation. Negative for abdominal pain, blood in stool, diarrhea, nausea and vomiting. Endocrine: Negative for polydipsia. Genitourinary: Negative for dysuria, flank pain, frequency, hematuria and urgency. Musculoskeletal: Positive for back pain, gait problem and myalgias. Negative for neck pain. Skin: Negative for rash. Allergic/Immunologic: Positive for immunocompromised state. Negative for environmental allergies. Neurological: Positive for weakness. Negative for dizziness, tremors, seizures and headaches. Hematological: Does not bruise/bleed easily. Psychiatric/Behavioral: Negative for hallucinations and suicidal ideas. The patient is not nervous/anxious. Physical Exam:    BP (!) 139/55   Pulse 64   Temp 97.9 °F (36.6 °C) (Oral)   Resp 16   Ht 5' 5\" (1.651 m)   Wt 160 lb (72.6 kg)   LMP  (LMP Unknown)   SpO2 100%   BMI 26.63 kg/m²      Physical Exam  Constitutional:       General: She is not in acute distress. Appearance: She is well-developed. She is ill-appearing. She is not toxic-appearing or diaphoretic. HENT:      Head: Normocephalic. Not macrocephalic and not microcephalic. No raccoon eyes or Man's sign. Mouth/Throat:      Pharynx: Oropharyngeal exudate present. Eyes:      General: No scleral icterus. Right eye: No discharge. reflexes are 0 on the right side and 0 on the left side. Achilles reflexes are 0 on the right side and 0 on the left side. Psychiatric:         Attention and Perception: She is attentive. Mood and Affect: Mood is not anxious or depressed. Affect is not angry. Speech: Speech is delayed. Speech is not rapid and pressured. Behavior: Behavior is slowed. Behavior is not withdrawn. Thought Content: Thought content normal.         Cognition and Memory: Memory is not impaired. She exhibits impaired recent memory. She does not exhibit impaired remote memory. Judgment: Judgment is not impulsive or inappropriate. Ortho Exam  Neurologic Exam     Mental Status   Level of consciousness: drowsy  Able to name object. Able to read. Cranial Nerves     CN III, IV, VI   Pupils are equal, round, and reactive to light.     Motor Exam   Muscle bulk: decreased  Overall muscle tone: normal    Gait, Coordination, and Reflexes     Reflexes   Right brachioradialis: 1+  Left brachioradialis: 1+  Right biceps: 1+  Left biceps: 1+  Right triceps: 1+  Left triceps: 1+  Right patellar: 0  Left patellar: 0  Right achilles: 0  Left achilles: 0Status Post left TFN             Diagnostics:    Recent Results (from the past 24 hour(s))   Protime-INR    Collection Time: 05/29/20  5:31 AM   Result Value Ref Range    Protime 12.7 12.3 - 14.9 sec    INR 0.9    RENAL FUNCTION PANEL    Collection Time: 05/29/20  5:31 AM   Result Value Ref Range    Sodium 133 (L) 135 - 144 mEq/L    Potassium 5.2 (H) 3.4 - 4.9 mEq/L    Chloride 97 95 - 107 mEq/L    CO2 27 20 - 31 mEq/L    Anion Gap 9 9 - 15 mEq/L    Glucose 130 (H) 70 - 99 mg/dL    BUN 11 8 - 23 mg/dL    CREATININE 0.53 0.50 - 0.90 mg/dL    GFR Non-African American >60.0 >60    GFR  >60.0 >60    Calcium 9.2 8.5 - 9.9 mg/dL    Phosphorus 3.1 2.3 - 4.8 mg/dL    Alb 3.3 (L) 3.5 - 4.6 g/dL   Magnesium    Collection Time: 05/29/20  5:31 AM

## 2020-05-29 NOTE — PROGRESS NOTES
rhythm, normal S1 and S2, no murmurs, rubs, clicks, or gallops,  no JVD  Pulmonary/Chest: clear to auscultation bilaterally- no wheezes, rales or rhonchi, normal air movement, no respiratory distress  Abdomen: soft, non-tender, non-distended, normal bowel sounds, no masses   Extremities: no cyanosis, clubbing or edema  Skin: warm and dry, no rash or erythema  Eyes: EOMI  Neck: supple and non-tender without mass, no thyromegaly   Neurological: alert, oriented, normal speech, no focal findings or movement disorder noted    Agree with exam. Post op leg still a bit sore. Allergies:   Allergies   Allergen Reactions    Lipitor [Atorvastatin Calcium]      Leg cramping        Medications:    budesonide-formoterol  2 puff Inhalation BID    predniSONE  40 mg Oral Daily    [Held by provider] furosemide  20 mg Oral BID    potassium chloride  20 mEq Oral BID    sodium chloride flush  10 mL Intravenous 2 times per day    acetaminophen  650 mg Oral Q6H    warfarin  5 mg Oral Daily    acetaminophen  1,000 mg Oral Daily    pantoprazole  40 mg Oral QAM AC    senna-docusate  2 tablet Oral BID    montelukast  10 mg Oral Nightly    tiotropium  2 puff Inhalation Daily    hydroxychloroquine  100 mg Oral BID    sotalol  120 mg Oral BID    lisinopril  20 mg Oral Nightly    magnesium oxide  400 mg Oral Daily       albuterol sulfate HFA, 2 puff, Q4H PRN  sodium chloride flush, 10 mL, PRN  famotidine (PEPCID) injection, 20 mg, BID PRN  HYDROcodone 5 mg - acetaminophen, 0.5 tablet, Q4H PRN    Or  HYDROcodone 5 mg - acetaminophen, 1 tablet, Q4H PRN    Or  HYDROcodone-acetaminophen, 1 tablet, Q4H PRN  morphine, 1 mg, Q2H PRN  methocarbamol, 500 mg, 4x Daily PRN  magnesium hydroxide, 30 mL, Daily PRN  promethazine, 12.5 mg, Q6H PRN    Or  ondansetron, 4 mg, Q6H PRN        Diagnostics:  EKG:  Sinus/paced    Echo:  05/26/2020  Normal left ventricular systolic function, no regional wall motion   abnormalities, estimated ejection fraction of 55-60%. Normal left ventricular size and function. Normal left ventricular wall thickness. Normal diastolic filling pattern for age. No evidence of mitral regurgitation. No evidence of mitral valve stenosis. No evidence of aortic valve regurgitation . No evidence of aortic valve stenosis. There is Trace tricuspid regurgitation with estimated RVSP of 36 mm Hg. CXR:  05/28/2020  FINDINGS:       Single  views of the chest is submitted. Left-sided CCD device. Leads overlying the cardiac silhouette. Unchanged The cardiac silhouette is of normal size configuration.  .   Pulmonary vascular unremarkable. Right sided trachea. No focal infiltrates.  No Pneumothoraces. Old healed right humeral head fracture                                                                                            Lab Data:    Cardiac Enzymes:  No results for input(s): CKTOTAL, CKMB, CKMBINDEX, TROPONINI in the last 72 hours. ProBNP: No results found for: PROBNP    CBC:   Recent Labs     05/27/20  0558 05/28/20  0542 05/29/20  0531   WBC 8.1 9.0 7.7   RBC 3.43* 2.75* 3.42*   HGB 9.2* 7.4* 9.5*   HCT 27.6* 22.2* 27.9*    132 153       CMP:    Recent Labs     05/27/20  0558 05/28/20  0542 05/29/20  0531   * 129* 133*   K 3.8 4.9  4.9 5.2*   CL 94* 95 97   CO2 30 29 27   BUN 5* 9 11   CREATININE 0.39* 0.50 0.53   GFRAA >60.0 >60.0 >60.0   LABGLOM >60.0 >60.0 >60.0   GLUCOSE 106* 98 130*   CALCIUM 9.0 8.7 9.2       Hepatic Function Panel:    Recent Labs     05/27/20  0558 05/28/20  0542 05/29/20  0531   LABALBU 3.4* 2.8* 3.3*       Magnesium:    Recent Labs     05/27/20  0558 05/28/20  0542 05/29/20  0531   MG 2.1 2.0 2.1       PT/INR:    Recent Labs     05/27/20  1333 05/28/20  0542 05/29/20  0531   PROTIME 13.8 13.9 12.7   INR 1.1 1.1 0.9       Lipids:  No results for input(s): CHOL, TRIG, HDL, LDLCALC, LABVLDL in the last 72 hours.     Active Problems:    Closed intertrochanteric fracture of hip, left, initial encounter ([facility] [address]    Closed head injury  Resolved Problems:    * No resolved hospital problems.  *           Electronically signed by RON Flores CNP on 5/29/2020 at 8:38 AM

## 2020-05-29 NOTE — PROGRESS NOTES
FRONTAL) Exam Date/Time:  5/25/2020 5:15 PM Clinical History:   PAIN    Fall . Comparison:  11/5/2011  RESULT: Lines, tubes, and devices:  Left transvenous pacemaker with leads in the region of the right atrium and right ventricle, unchanged. Lungs and pleura:  Emphysematous changes. No focal consolidation. No pleural effusion. No pneumothorax. Normal pulmonary vascular pattern. Cardiomediastinal silhouette:  Normal. Aortic atherosclerotic calcification. Other:  No acute osseous findings. Surgical clips right upper quadrant. No acute radiographic abnormality or significant interval change. Emphysema    Xr Femur Left (min 2 Views)    Result Date: 5/26/2020  EXAMINATION:  XR FEMUR LEFT (MIN 2 VIEWS), XR HIP 2-3 VW W PELVIS LEFT HISTORY:   PAIN    Fall . Tripped over nebulizer tubing, fell on left hip. Left hip pain. TECHNIQUE:  XR FEMUR LEFT (MIN 2 VIEWS), XR HIP 2-3 VW W PELVIS LEFT COMPARISON: Correlation with abdominal radiographs from 3/1/2012. RESULT: Acute complex left proximal femur intertrochanteric fracture, with fracture line extending to involve both the greater and lesser trochanters, with multiple small fracture fragments at the fracture sites and displacement measuring up to 15 mm. Alignment of left hip appears maintained. Degenerative changes lower lumbar spine and SI joints. Right hip unremarkable. No other fracture in the distal femur on the dedicated left femur radiographs. Alignment is grossly maintained. No other significant abnormality. ---------------------------------------------     Left femur complex intertrochanteric fracture. Ct Head Wo Contrast    Result Date: 5/25/2020  EXAM: CT SCAN OF THE BRAIN WITHOUT CONTRAST COMPARISON: 5/3/2013 REASONS FOR EXAMINATION:     TRAUMA, HEAD INJURY IN FALL, PATIENT STRUCK HEAD IN FALL, HEADACHE TECHNIQUE:  CT brain is obtained without IV contrast agent. FINDINGS: An unenhanced CT scan of the brain demonstrates no evidence of a skull fracture. Alysa Mcintosh MD,  FCCP   on 5/29/2020 at 3:00 PM

## 2020-05-29 NOTE — PROGRESS NOTES
Physical Therapy Med Surg Daily Treatment Note  [facility]ility[facility]Department[facility]  [place] [facility] [place]       NAME: Lilliana Nielsen  : 1935 (81 y.o.)  MRN: [id number]  CODE STATUS: Full Code    Date of Service: 2020    Patient Diagnosis(es): Closed intertrochanteric fracture of hip, left, initial encounter [facility]) [S72.142A]   Chief Complaint   Patient presents with   Candace Rivera     pt states she tripped on hose and fell on concrete; pt states she hit her head during fall; - loc; + coumadin; deformity noted to left hip     Patient Active Problem List    Diagnosis Date Noted    Atrial fibrillation ([facility] [address] 2020    Calculus of ureter 2020    Coronary atherosclerosis 2020    Reflux esophagitis 2020    Tobacco user 2020    Vitamin D deficiency 2020    Gait abnormality 2020    Hyponatremia 2020    Hyperkalemia 2020    Age-related osteoporosis with current pathological fracture with routine healing 2020    Closed intertrochanteric fracture of hip, left, initial encounter ([address] 2020    Closed head injury     Chronic obstructive pulmonary disease, unspecified ([address] 10/30/2018    Obstructive sleep apnea (adult) (pediatric) 10/30/2018    Hyperlipidemia, unspecified 2018    Respiratory failure ([address] 2018    Cough 2018    Essential hypertension 2018    Sick sinus syndrome ([address] 2018    History of basal cell carcinoma 2018    Eczema 2017    Hammer toe 03/15/2016    Peripheral vascular disease ([address] 03/15/2016    Edema of toe 2016    Fatigue 2016    Osteopenia 2016        Past Medical History:   Diagnosis Date    COPD (chronic obstructive pulmonary disease) ([address]     Hypertension     Lupus ([address]      Past Surgical History:   Procedure Laterality Date    APPENDECTOMY      CARDIAC PACEMAKER PLACEMENT      CHOLECYSTECTOMY      ELBOW SURGERY Left     FEMUR FRACTURE SURGERY Left 5/27/2020    INTRAMEDULLARY NAIL LEFT FEMUR performed by Cecilia Choi MD at [address] [place] [address]  Restrictions/Precautions: Weight Bearing; Fall Risk  Lower Extremity Weight Bearing Restrictions  Left Lower Extremity Weight Bearing: Partial Weight Bearing  Partial Weight Bearing Percentage Or Pounds: 25%    SUBJECTIVE   General  Chart Reviewed: Yes  Family / Caregiver Present: No  Subjective  Subjective: \"I can't lift my leg up,\"     Pre-Session Pain Report  Pre Treatment Pain Screening  Pain at present: 4  Scale Used: Numeric Score  Intervention List: Patient able to continue with treatment  Pain Screening  Patient Currently in Pain: Yes       Post-Session Pain Report  Pain Assessment  Pain Assessment: 0-10  Pain Level: 4  Pain Type: Acute pain  Pain Location: Hip  Pain Orientation: Left         OBJECTIVE        Bed mobility  Supine to Sit: Minimal assistance(pt needs assistance with lifting LLE. )  Comment: vc's for improved sequencing. Transfers  Sit to Stand: Minimal Assistance  Stand to sit: Minimal Assistance  Bed to Chair: Minimal assistance  Comment:  step by step to sequence with 2ww and maintain 25% PWB, increased time and effort. SOB upon exertion on 2L O2. Ambulation  Ambulation?: Yes  Ambulation 1  Surface: level tile  Device: Rolling Walker  Other Apparatus: O2  Assistance: Minimal assistance  Quality of Gait: hop-to pattern, short steps with decreased step clearance  Distance: 12' within room with turns. Comments: vc's for improved technique to maintain 25% WB, pt with improved ability once closer to [address] [place] [address]. Exercises  Hip Flexion: x 10 AAROM  Knee Long Arc Quad: x10  Ankle Pumps: x10                    Activity Tolerance  Activity Tolerance: Patient Tolerated treatment well          ASSESSMENT   Assessment: pt able to increase ambulation distance, pt with improved WB adherance towards end of session once cued.       Discharge

## 2020-05-29 NOTE — PROGRESS NOTES
Hospitalist Progress Note      PCP: Princess Nicci DO    Date of Admission: 5/25/2020    Chief Complaint:  No acute events, afebrile, stable HD,     Medications:  Reviewed    Infusion Medications     Scheduled Medications    budesonide-formoterol  2 puff Inhalation BID    predniSONE  40 mg Oral Daily    furosemide  20 mg Oral BID    [Held by provider] potassium chloride  20 mEq Oral BID    sodium chloride flush  10 mL Intravenous 2 times per day    acetaminophen  650 mg Oral Q6H    warfarin  5 mg Oral Daily    acetaminophen  1,000 mg Oral Daily    pantoprazole  40 mg Oral QAM AC    senna-docusate  2 tablet Oral BID    montelukast  10 mg Oral Nightly    tiotropium  2 puff Inhalation Daily    hydroxychloroquine  100 mg Oral BID    sotalol  120 mg Oral BID    lisinopril  20 mg Oral Nightly    magnesium oxide  400 mg Oral Daily     PRN Meds: albuterol sulfate HFA, sodium chloride flush, famotidine (PEPCID) injection, HYDROcodone 5 mg - acetaminophen **OR** HYDROcodone 5 mg - acetaminophen **OR** HYDROcodone-acetaminophen, morphine, methocarbamol, magnesium hydroxide, promethazine **OR** ondansetron      Intake/Output Summary (Last 24 hours) at 5/29/2020 1154  Last data filed at 5/28/2020 2129  Gross per 24 hour   Intake 1835.83 ml   Output 400 ml   Net 1435.83 ml       Exam:    BP (!) 139/55   Pulse 64   Temp 97.9 °F (36.6 °C) (Oral)   Resp 16   Ht 5' 5\" (1.651 m)   Wt 160 lb (72.6 kg)   LMP  (LMP Unknown)   SpO2 100%   BMI 26.63 kg/m²     General appearance: awake and alert, cooperative. Respiratory: diminished BS bilaterally. Cardiovascular: Regular rate and rhythm with normal S1/S2 . Abdomen: Soft, active bowel sounds. Musculoskeletal: No edema bilaterally.      Labs:   Recent Labs     05/27/20  0558 05/28/20  0542 05/29/20  0531   WBC 8.1 9.0 7.7   HGB 9.2* 7.4* 9.5*   HCT 27.6* 22.2* 27.9*    132 153     Recent Labs     05/27/20  0558 05/28/20  0542 05/29/20  0531   * 129* 133*   K 3.8 4.9  4.9 5.2*   CL 94* 95 97   CO2 30 29 27   BUN 5* 9 11   CREATININE 0.39* 0.50 0.53   CALCIUM 9.0 8.7 9.2   PHOS 2.5 2.6 3.1     No results for input(s): AST, ALT, BILIDIR, BILITOT, ALKPHOS in the last 72 hours. Recent Labs     05/27/20  1333 05/28/20  0542 05/29/20  0531   INR 1.1 1.1 0.9     No results for input(s): CKTOTAL, TROPONINI in the last 72 hours. Urinalysis:      Lab Results   Component Value Date    NITRU Negative 05/25/2020    WBCUA 3-5 05/25/2020    BACTERIA Negative 05/25/2020    RBCUA 3-5 05/25/2020    BLOODU Negative 05/25/2020    SPECGRAV 1.010 05/25/2020    GLUCOSEU Negative 05/25/2020    GLUCOSEU NEG 11/06/2011       Radiology:  XR CHEST PORTABLE   Final Result   NO ACUTE ACTIVE CARDIOPULMONARY PROCESS      FLUORO FOR SURGICAL PROCEDURES   Final Result   FLUOROSCOPIC INTRAOPERATIVE IMAGING ASSISTANCE FOR LEFT HIP TFN ORIF         XR CHEST (SINGLE VIEW FRONTAL)   Final Result   NO ACUTE ACTIVE CARDIOPULMONARY PROCESS. RADIOGRAPHIC FINDINGS OF COPD      XR HIP 2-3 VW W PELVIS LEFT   Final Result      Left femur complex intertrochanteric fracture. XR FEMUR LEFT (MIN 2 VIEWS)   Final Result      Left femur complex intertrochanteric fracture. XR CHEST (SINGLE VIEW FRONTAL)   Final Result      No acute radiographic abnormality or significant interval change. Emphysema      CT HEAD WO CONTRAST   Final Result   1. CEREBRAL ATROPHY AND AGE RELATED FINDINGS IN THE BRAIN. 2. NO ACUTE INTRA-AXIAL OR EXTRA-AXIAL FINDINGS IN THE BRAIN. All CT scans at this facility use dose modulation, iterative reconstruction, and/or weight based dosing when appropriate to reduce radiation dose to as low as reasonably achievable. CT CERVICAL SPINE WO CONTRAST   Final Result   1. DEGENERATIVE AND ARTHRITIC CHANGES THROUGHOUT THE C-SPINE AS DESCRIBED. 2.  NO ACUTE FRACTURE, SUBLUXATION OR DISLOCATION INVOLVING THE CERVICAL SPINE.       All CT scans at this facility use dose modulation, iterative reconstruction, and/or weight based dosing when appropriate to reduce radiation dose to as low as reasonably achievable.                  Assessment/Plan:    80 y/o female with history of AFIB on Warfarin, SSS s/p PPM, HTN, SLE, COPD on home O2, GONZALEZ who presented with :     Acute left intertrochanteric femur fracture s/p fall  - s/p repair   - management per Ortho    Hyponatremia  - improving  - holding HCTZ and furosemide  - monitor Na level    Hyperkalemia   - mild, hold K supplement    Acute blood loss anemia  - Hb improved s/p transfusion of 1 unit of PRBCs    AFIB, SSS s/p PPM  - warfarin resumed  - on Sotalol  - follow INR  - cardiology following    COPD exacerbation/ Chronic respiratory failure with hypoxia  - started on Prednisone and Symbicort  - pulmonology following    SLE  - continue plaquenil        Electronically signed by Isabel Mccloud MD on 5/29/2020 at 11:54 AM

## 2020-05-30 PROBLEM — M81.0 AGE RELATED OSTEOPOROSIS: Status: ACTIVE | Noted: 2020-05-29

## 2020-05-30 PROBLEM — R26.9 ABNORMALITY OF GAIT AND MOBILITY: Status: ACTIVE | Noted: 2020-05-30

## 2020-05-30 LAB
ANION GAP SERPL CALCULATED.3IONS-SCNC: 10 MEQ/L (ref 9–15)
BUN BLDV-MCNC: 13 MG/DL (ref 8–23)
CALCIUM SERPL-MCNC: 9.4 MG/DL (ref 8.5–9.9)
CHLORIDE BLD-SCNC: 93 MEQ/L (ref 95–107)
CO2: 29 MEQ/L (ref 20–31)
CREAT SERPL-MCNC: 0.66 MG/DL (ref 0.5–0.9)
GFR AFRICAN AMERICAN: >60
GFR NON-AFRICAN AMERICAN: >60
GLUCOSE BLD-MCNC: 170 MG/DL (ref 70–99)
INR BLD: 1.1
POTASSIUM REFLEX MAGNESIUM: 5.2 MEQ/L (ref 3.4–4.9)
PROTHROMBIN TIME: 13.9 SEC (ref 12.3–14.9)
SODIUM BLD-SCNC: 132 MEQ/L (ref 135–144)

## 2020-05-30 PROCEDURE — 6360000002 HC RX W HCPCS: Performed by: PHYSICAL MEDICINE & REHABILITATION

## 2020-05-30 PROCEDURE — 94664 DEMO&/EVAL PT USE INHALER: CPT

## 2020-05-30 PROCEDURE — 85610 PROTHROMBIN TIME: CPT

## 2020-05-30 PROCEDURE — 2700000000 HC OXYGEN THERAPY PER DAY

## 2020-05-30 PROCEDURE — 94640 AIRWAY INHALATION TREATMENT: CPT

## 2020-05-30 PROCEDURE — 6370000000 HC RX 637 (ALT 250 FOR IP): Performed by: NURSE PRACTITIONER

## 2020-05-30 PROCEDURE — 97116 GAIT TRAINING THERAPY: CPT

## 2020-05-30 PROCEDURE — 36415 COLL VENOUS BLD VENIPUNCTURE: CPT

## 2020-05-30 PROCEDURE — 94761 N-INVAS EAR/PLS OXIMETRY MLT: CPT

## 2020-05-30 PROCEDURE — 6370000000 HC RX 637 (ALT 250 FOR IP): Performed by: PHYSICAL MEDICINE & REHABILITATION

## 2020-05-30 PROCEDURE — 97110 THERAPEUTIC EXERCISES: CPT

## 2020-05-30 PROCEDURE — 99222 1ST HOSP IP/OBS MODERATE 55: CPT | Performed by: PHYSICAL MEDICINE & REHABILITATION

## 2020-05-30 PROCEDURE — 97162 PT EVAL MOD COMPLEX 30 MIN: CPT

## 2020-05-30 PROCEDURE — 1180000000 HC REHAB R&B

## 2020-05-30 PROCEDURE — 97535 SELF CARE MNGMENT TRAINING: CPT

## 2020-05-30 PROCEDURE — 97165 OT EVAL LOW COMPLEX 30 MIN: CPT

## 2020-05-30 PROCEDURE — 80048 BASIC METABOLIC PNL TOTAL CA: CPT

## 2020-05-30 PROCEDURE — 97140 MANUAL THERAPY 1/> REGIONS: CPT

## 2020-05-30 RX ORDER — LIDOCAINE 4 G/G
3 PATCH TOPICAL DAILY
Status: DISCONTINUED | OUTPATIENT
Start: 2020-05-30 | End: 2020-06-10 | Stop reason: HOSPADM

## 2020-05-30 RX ORDER — ACETAMINOPHEN 325 MG/1
650 TABLET ORAL EVERY 4 HOURS PRN
Status: DISCONTINUED | OUTPATIENT
Start: 2020-05-30 | End: 2020-06-10 | Stop reason: HOSPADM

## 2020-05-30 RX ORDER — SODIUM PHOSPHATE, DIBASIC AND SODIUM PHOSPHATE, MONOBASIC 7; 19 G/133ML; G/133ML
1 ENEMA RECTAL DAILY PRN
Status: DISCONTINUED | OUTPATIENT
Start: 2020-05-30 | End: 2020-06-10 | Stop reason: HOSPADM

## 2020-05-30 RX ORDER — UBIDECARENONE 100 MG
100 CAPSULE ORAL
Status: DISCONTINUED | OUTPATIENT
Start: 2020-05-30 | End: 2020-06-10 | Stop reason: HOSPADM

## 2020-05-30 RX ORDER — CYANOCOBALAMIN 1000 UG/ML
1000 INJECTION INTRAMUSCULAR; SUBCUTANEOUS WEEKLY
Status: DISCONTINUED | OUTPATIENT
Start: 2020-05-30 | End: 2020-06-10 | Stop reason: HOSPADM

## 2020-05-30 RX ORDER — BUDESONIDE AND FORMOTEROL FUMARATE DIHYDRATE 80; 4.5 UG/1; UG/1
2 AEROSOL RESPIRATORY (INHALATION) 2 TIMES DAILY
Status: DISCONTINUED | OUTPATIENT
Start: 2020-05-30 | End: 2020-06-10 | Stop reason: HOSPADM

## 2020-05-30 RX ORDER — PANTOPRAZOLE SODIUM 20 MG/1
20 TABLET, DELAYED RELEASE ORAL
Status: DISCONTINUED | OUTPATIENT
Start: 2020-05-31 | End: 2020-06-10 | Stop reason: HOSPADM

## 2020-05-30 RX ORDER — VITAMIN B COMPLEX
2000 TABLET ORAL
Status: DISCONTINUED | OUTPATIENT
Start: 2020-05-30 | End: 2020-06-10 | Stop reason: HOSPADM

## 2020-05-30 RX ORDER — BUDESONIDE AND FORMOTEROL FUMARATE DIHYDRATE 80; 4.5 UG/1; UG/1
2 AEROSOL RESPIRATORY (INHALATION) 2 TIMES DAILY
Status: DISCONTINUED | OUTPATIENT
Start: 2020-05-30 | End: 2020-05-30

## 2020-05-30 RX ADMIN — METHOCARBAMOL TABLETS 500 MG: 500 TABLET, COATED ORAL at 03:01

## 2020-05-30 RX ADMIN — PANTOPRAZOLE SODIUM 40 MG: 40 TABLET, DELAYED RELEASE ORAL at 06:01

## 2020-05-30 RX ADMIN — DOCUSATE SODIUM 50MG AND SENNOSIDES 8.6MG 2 TABLET: 8.6; 5 TABLET, FILM COATED ORAL at 09:21

## 2020-05-30 RX ADMIN — PREDNISONE 40 MG: 20 TABLET ORAL at 09:22

## 2020-05-30 RX ADMIN — LISINOPRIL 20 MG: 20 TABLET ORAL at 20:32

## 2020-05-30 RX ADMIN — Medication 100 MG: at 13:04

## 2020-05-30 RX ADMIN — HYDROXYCHLOROQUINE SULFATE 100 MG: 200 TABLET, FILM COATED ORAL at 20:31

## 2020-05-30 RX ADMIN — SOTALOL HYDROCHLORIDE 120 MG: 120 TABLET ORAL at 09:22

## 2020-05-30 RX ADMIN — ACETAMINOPHEN 650 MG: 325 TABLET ORAL at 17:08

## 2020-05-30 RX ADMIN — DOCUSATE SODIUM 50MG AND SENNOSIDES 8.6MG 2 TABLET: 8.6; 5 TABLET, FILM COATED ORAL at 20:32

## 2020-05-30 RX ADMIN — WARFARIN SODIUM 5 MG: 5 TABLET ORAL at 17:10

## 2020-05-30 RX ADMIN — ACETAMINOPHEN 650 MG: 325 TABLET ORAL at 03:00

## 2020-05-30 RX ADMIN — BUDESONIDE AND FORMOTEROL FUMARATE DIHYDRATE 2 PUFF: 80; 4.5 AEROSOL RESPIRATORY (INHALATION) at 04:27

## 2020-05-30 RX ADMIN — MONTELUKAST 10 MG: 10 TABLET, FILM COATED ORAL at 20:32

## 2020-05-30 RX ADMIN — TIOTROPIUM BROMIDE INHALATION SPRAY 2 PUFF: 3.12 SPRAY, METERED RESPIRATORY (INHALATION) at 04:27

## 2020-05-30 RX ADMIN — ACETAMINOPHEN 650 MG: 325 TABLET ORAL at 09:22

## 2020-05-30 RX ADMIN — CYANOCOBALAMIN 1000 MCG: 1000 INJECTION, SOLUTION INTRAMUSCULAR; SUBCUTANEOUS at 09:23

## 2020-05-30 RX ADMIN — HYDROXYCHLOROQUINE SULFATE 100 MG: 200 TABLET, FILM COATED ORAL at 09:33

## 2020-05-30 RX ADMIN — ACETAMINOPHEN 650 MG: 325 TABLET ORAL at 20:31

## 2020-05-30 RX ADMIN — BUDESONIDE AND FORMOTEROL FUMARATE DIHYDRATE 2 PUFF: 80; 4.5 AEROSOL RESPIRATORY (INHALATION) at 16:29

## 2020-05-30 RX ADMIN — Medication 100 MG: at 09:22

## 2020-05-30 RX ADMIN — Medication 400 MG: at 09:22

## 2020-05-30 RX ADMIN — VITAMIN D, TAB 1000IU (100/BT) 2000 UNITS: 25 TAB at 17:10

## 2020-05-30 RX ADMIN — SOTALOL HYDROCHLORIDE 120 MG: 120 TABLET ORAL at 20:32

## 2020-05-30 ASSESSMENT — ENCOUNTER SYMPTOMS
SHORTNESS OF BREATH: 1
BLOOD IN STOOL: 0
EYE REDNESS: 0
DIARRHEA: 0
SWOLLEN GLANDS: 0
COUGH: 0
PHOTOPHOBIA: 0
SORE THROAT: 0
EYE PAIN: 0
NAUSEA: 0
STRIDOR: 0
WHEEZING: 0
VOMITING: 0
CONSTIPATION: 1
ABDOMINAL PAIN: 0
VISUAL CHANGE: 0
BACK PAIN: 1

## 2020-05-30 ASSESSMENT — PAIN SCALES - GENERAL
PAINLEVEL_OUTOF10: 1
PAINLEVEL_OUTOF10: 3
PAINLEVEL_OUTOF10: 4
PAINLEVEL_OUTOF10: 4
PAINLEVEL_OUTOF10: 2
PAINLEVEL_OUTOF10: 4
PAINLEVEL_OUTOF10: 0
PAINLEVEL_OUTOF10: 4
PAINLEVEL_OUTOF10: 4
PAINLEVEL_OUTOF10: 3

## 2020-05-30 ASSESSMENT — PAIN DESCRIPTION - PAIN TYPE
TYPE: ACUTE PAIN
TYPE: ACUTE PAIN

## 2020-05-30 ASSESSMENT — PAIN DESCRIPTION - PROGRESSION: CLINICAL_PROGRESSION: GRADUALLY IMPROVING

## 2020-05-30 ASSESSMENT — PAIN DESCRIPTION - LOCATION
LOCATION: HIP

## 2020-05-30 ASSESSMENT — PAIN DESCRIPTION - FREQUENCY
FREQUENCY: INTERMITTENT
FREQUENCY: INTERMITTENT

## 2020-05-30 ASSESSMENT — PAIN DESCRIPTION - ORIENTATION
ORIENTATION: LEFT

## 2020-05-30 ASSESSMENT — PAIN - FUNCTIONAL ASSESSMENT: PAIN_FUNCTIONAL_ASSESSMENT: PREVENTS OR INTERFERES WITH MANY ACTIVE NOT PASSIVE ACTIVITIES

## 2020-05-30 ASSESSMENT — PAIN DESCRIPTION - DESCRIPTORS
DESCRIPTORS: ACHING

## 2020-05-30 NOTE — PLAN OF CARE
Problem: Falls - Risk of:  Goal: Will remain free from falls  Description: Will remain free from falls  5/30/2020 1255 by Katharine Gracia RN  Outcome: Ongoing  5/30/2020 0205 by Rodri Dolan RN  Outcome: Ongoing  Goal: Absence of physical injury  Description: Absence of physical injury  5/30/2020 1255 by Katharine Gracia RN  Outcome: Ongoing  5/30/2020 0205 by Rodri Dolan RN  Outcome: Ongoing     Problem: ABCDS Injury Assessment  Goal: Absence of physical injury  5/30/2020 1255 by Katharine Gracia RN  Outcome: Ongoing  5/30/2020 0205 by Rodri Dolan RN  Outcome: Ongoing     Problem: Pain:  Goal: Pain level will decrease  Description: Pain level will decrease  5/30/2020 1255 by Katharine Gracia RN  Outcome: Ongoing  5/30/2020 0205 by Rodri Dolan RN  Outcome: Ongoing  Goal: Control of acute pain  Description: Control of acute pain  5/30/2020 1255 by Katharine Gracia RN  Outcome: Ongoing  5/30/2020 0205 by Rodri Dolan RN  Outcome: Ongoing  Goal: Control of chronic pain  Description: Control of chronic pain  5/30/2020 1255 by Katharine Gracia RN  Outcome: Ongoing  5/30/2020 0205 by Rodri Dolan RN  Outcome: Ongoing     Problem: Mobility - Impaired:  Goal: Mobility will improve  Description: Mobility will improve  5/30/2020 1255 by Katharine Gracia RN  Outcome: Ongoing  5/30/2020 0205 by Rodri Dolan RN  Outcome: Ongoing     Problem: Nutrition  Goal: Optimal nutrition therapy  5/30/2020 1255 by Katharine Gracia RN  Outcome: Ongoing  5/30/2020 1236 by Mitch Kumar RD, LD  Outcome: Ongoing

## 2020-05-30 NOTE — CONSULTS
Consult Note    Reason for Consult: Medical management of hypertension, COPD and hyperkalemia    Requesting Physician:  Rasheeda Forde DO    HISTORY OF PRESENT ILLNESS:    The patient is a 81 y.o. female history of AFIB on Warfarin, SSS s/p PPM, HTN, SLE, COPD on home O2, GONZALEZ who presented to rehab unit status post ORIF/ intramedullary nailing for left femur intertrochanter fracture. Patient doing well with therapy. Has no complaints. She denies shortness of breath or dysuria. She is hemodynamically stable and afebrile      Past Medical History:   Diagnosis Date    [facility]chronic obstructive [facility] disease) [facility] [address]     Hypertension     Lupus ([facility] [address]        Past Surgical History:   Procedure Laterality Date    APPENDECTOMY      CARDIAC PACEMAKER PLACEMENT      CHOLECYSTECTOMY      ELBOW SURGERY Left     FEMUR FRACTURE SURGERY Left 5/27/2020    INTRAMEDULLARY NAIL LEFT FEMUR performed by Charanjit Espino MD at [facility]       Prior to Admission medications    Medication Sig Start Date End Date Taking?  Authorizing Provider   aspirin 81 MG chewable tablet Take 81 mg by mouth daily   Yes Historical Provider, MD   sotalol (BETAPACE) 120 MG tablet Take 120 mg by mouth 2 times daily    Historical Provider, MD   hydroxychloroquine (PLAQUENIL) 200 MG tablet Take 100 mg by mouth 2 times daily    Historical Provider, MD   furosemide (LASIX) 20 MG tablet Take 40 mg by mouth daily     Historical Provider, MD   potassium chloride (KLOR-CON M) 20 MEQ extended release tablet Take 20 mEq by mouth 2 times daily    Historical Provider, MD   magnesium oxide (MAG-OX) 400 MG tablet Take 400 mg by mouth daily    Historical Provider, MD   hydroCHLOROthiazide (MICROZIDE) 12.5 MG capsule Take 12.5 mg by mouth daily    Historical Provider, MD   lisinopril (PRINIVIL;ZESTRIL) 20 MG tablet Take 20 mg by mouth nightly    Historical Provider, MD   warfarin (COUMADIN) 4 MG tablet Take 4 mg by mouth every other day    Historical Lifestyle    Physical activity     Days per week: 0 days     Minutes per session: 0 min    Stress: Only a little   Relationships    Social connections     Talks on phone: More than three times a week     Gets together: Once a week     Attends Gn[employer]ist service: 1 to 4 times per year     Active member of club or organization: No     Attends meetings of clubs or organizations: Never     Relationship status:     Intimate partner violence     Fear of current or ex partner: No     Emotionally abused: No     Physically abused: No     Forced sexual activity: No   Other Topics Concern    Not on file   Social History Narrative         Lives With: Family    Type of Home: [id number] [place] [id number] [place]: Two level, Bed/Bath upstairs    Bathroom Shower/Tub: Tub/Shower unit    Bathroom Equipment: Shower chair, Grab bars in [facility]: 4 wheeled walker    ADL Assistance: Independent    Homemaking Assistance: Independent    Homemaking Responsibilities: Yes    Laundry Responsibility: Primary    Cleaning Responsibility: (Pt completes dishes and light homemaking and completes her own laundry management)    Ambulation Assistance: Independent    Transfer Assistance: Independent    Additional Comments: wears 02 at night       History reviewed. No pertinent family history. Review Of Systems:   CONSTITUTIONAL:  negative  EYES:  negative  HEENT:  negative  RESPIRATORY:  negative  CARDIOVASCULAR:  negative  GASTROINTESTINAL:  negative  GENITOURINARY:  negative  INTEGUMENT/BREAST:  negative  HEMATOLOGIC/LYMPHATIC:  negative  ALLERGIC/IMMUNOLOGIC:  negative  ENDOCRINE:  negative  MUSCULOSKELETAL:  negative  NEUROLOGICAL:  negative  BEHAVIOR/PSYCH:  negative    Physical Exam:  Vitals:    05/29/20 2056 05/29/20 2312 05/30/20 0427   BP: (!) 167/76     Resp:  18 18   SpO2:   99%       General: alert, cooperative, no distress  Head: normocephalic, atraumatic  Eyes:No gross abnormalities.  and

## 2020-05-30 NOTE — H&P
problem. The current episode started in the past 7 days. The problem occurs constantly. The problem has been gradually improving. Associated symptoms include anorexia, arthralgias, fatigue, joint swelling, myalgias and weakness. Pertinent negatives include no abdominal pain, chest pain, chills, congestion, coughing, diaphoresis, fever, headaches, nausea, neck pain, numbness, rash, sore throat, swollen glands, urinary symptoms, vertigo, visual change or vomiting. The symptoms are aggravated by walking and bending. She has tried immobilization, oral narcotics, rest, ice and acetaminophen for the symptoms. The treatment provided mild relief. According to last night RN, patient sleeping at long intervals with oxygen at 2 liters. Up to [facility] with 1 assist x2. Continent. Medicated with scheduled Tylenol and Robaxin x1. The patient has stabilized medically andis able to participate at acute level rehab but is too medically complex for SNF due to need for therapy at the acute level with at least 15 hours a week of PT OT and cognitive and recreational therapy at an acute level with daily medical monitoring. Imaging:    Imaging and other studies reviewed and discussed with patient and staff    Xr Chest  5/26/2020  EXAMINATION: CHEST PORTABLE VIEW  CLINICAL HISTORY: Short of breath COMPARISONS: May 25, 2020  FINDINGS: 2 views of the chest is submitted. Left-sided CCD device. Leads overlying the cardiac silhouette. Unchanged  The cardiac silhouette is of normal size configuration. Pulmonary vascular attenuated. Lung fields are hyperinflated. Coarse interstitium. Right sided trachea. No focal infiltrates. No Pneumothoraces. NO ACUTE ACTIVE CARDIOPULMONARY PROCESS.  RADIOGRAPHIC FINDINGS OF COPD        Xr Chest  : 5/26/2020    Lines, tubes, and devices:  Left transvenous pacemaker with leads in the region of the right atrium and right ventricle, unchanged. Lungs and pleura:  Emphysematous changes. No focal consolidation. No pleural effusion. No pneumothorax. Normal pulmonary vascular pattern. Cardiomediastinal silhouette:  Normal. Aortic atherosclerotic calcification. Other:  No acute osseous findings. Surgical clips right upper quadrant. No acute radiographic abnormality or significant interval change. Emphysema      Xr Femur Left   5/26/2020 : Acute complex left proximal femur intertrochanteric fracture, with fracture line extending to involve both the greater and lesser trochanters, with multiple small fracture fragments at the fracture sites and displacement measuring up to 15 mm. Alignment of left hip appears maintained. Degenerative changes lower lumbar spine and SI joints. Right hip unremarkable. No other fracture in the distal femur on the dedicated left femur radiographs. Alignment is grossly maintained. No other significant abnormality. Left femur complex intertrochanteric fracture. Ct Head Wo  5/25/2020    An unenhanced CT scan of the brain demonstrates no evidence of a skull fracture. There is cerebral atrophy and age related findings in the brain. There is no acute CVA. There is no acute intra-axial or extra-axial findings in the brain. There is no hydrocephalus, intracranial mass, hemorrhage, \"mass effect\" or midline shift. The remainder of the CT scan of the brain appears unremarkable. 1. CEREBRAL ATROPHY AND AGE RELATED FINDINGS IN THE BRAIN. 2. NO ACUTE INTRA-AXIAL OR EXTRA-AXIAL FINDINGS IN THE BRAIN. Ct Cervical Spine  : 5/25/2020   : There is no prevertebral soft tissue swelling. 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 There is cervical spondylosis with degenerative bone spurring and posterior facet arthritis at multiple cervical vertebral levels. There is uncovertebral joint arthropathy throughout the cervical spine.  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 There is no fracture, subluxation or dislocation involving the cervical spine. There are no osteolytic or osteoblastic lesion in the cervical spine. No acute traumatic bone injury involving the cervical spine. 1.  DEGENERATIVE AND ARTHRITIC CHANGES THROUGHOUT THE C-SPINE AS DESCRIBED. 2.  NO ACUTE FRACTURE, SUBLUXATION OR DISLOCATION INVOLVING THE CERVICAL SPINE. Xr Chest   5/28/2020  EXAMINATION: CHEST PORTABLE VIEW  CLINICAL HISTORY: Short of breath COMPARISONS: May 26, 2020  FINDINGS: Single  views of the chest is submitted. Left-sided CCD device. Leads overlying the cardiac silhouette. Unchanged The cardiac silhouette is of normal size configuration. . Pulmonary vascular unremarkable. Right sided trachea. No focal infiltrates. No Pneumothoraces. Old healed right humeral head fracture                                                                                   NO ACUTE ACTIVE CARDIOPULMONARY PROCESS    Fluoro For Surgical   5/27/2020  EXAMINATION:  FLUORO FOR SURGICAL PROCEDURES CLINICAL HISTORY:  ORIF Left Femur COMPARISONS:  Left femur radiographs 5/26/2020 TECHNIQUE: Intraoperative fluoroscopy. Fluoroscopic time 34.9 seconds. 9 screen capture images. FINDINGS:  Submitted screen capture images document left femoral TFN fixation in near-anatomic alignment. See operative report for correlation. FLUOROSCOPIC INTRAOPERATIVE IMAGING ASSISTANCE FOR LEFT HIP TFN ORIF     Xr Hip 2-3 Vw W Pelvis Left  5/26/2020    Acute complex left proximal femur intertrochanteric fracture, with fracture line extending to involve both the greater and lesser trochanters, with multiple small fracture fragments at the fracture sites and displacement measuring up to 15 mm. Alignment of left hip appears maintained. Degenerative changes lower lumbar spine and SI joints. Right hip unremarkable.  No other fracture in the distal femur on Rehabilitation Assessments:    Physical therapy: FIMS:  Bed Mobility:      Transfers:Sit to Stand: Stand by assistance  Stand to sit: Stand by assistance  Bed to Chair: Minimal assistance, Contact guard assistance(to/from therapy mat), Ambulation 1  Surface: level tile  Device: Rolling Walker  Other Apparatus: O2(2L)  Assistance: Contact guard assistance  Quality of Gait: hop-to pattern, short steps with decreased step clearance  Distance: 35ft x1 standing RB D/T SOB, straight path  Comments: good ability to maintain 25% WB status w/ walker low allowing B arms to extend/ support weight ,      FIMS:  , , Assessment: Pt present s/p fall with IM nailing for repair of intertrochanteric hip fracture. Occupational therapy: FIMS:   ,  ,      OCCUPATIONAL THERAPY  Hand Dominance: Right  ADL  Feeding: Modified independent  (05/30/20 0844)  Grooming: Setup (05/30/20 0844)  UE Bathing: Setup (05/30/20 0844)  LE Bathing: Minimal assistance(for her left foot) (05/30/20 0844)  UE Dressing: Setup (05/30/20 0844)  LE Dressing: Minimal assistance(to don left sock and shoe) (05/30/20 0844)  Toileting: Stand by assistance (05/30/20 0844)  Toilet Transfers  Toilet - Technique: Stand pivot (05/30/20 5116)  Equipment Used: Standard bedside commode (05/30/20 0846)  Toilet Transfer: Stand by assistance (05/30/20 0846)  Toilet Transfers Comments: Patient used a ww to transfer from the bed to a [facility] (05/30/20 0846)  Tub Transfers  Tub Transfers: Not tested (05/30/20 0846)       Speech therapy: FIMS:       Prior to admission patient was independent with all ADLs and mobilityand did not require any outside services.        Past Medical History:   Diagnosis Date    COPD [facility]chronic obstructive [facility] disease) [facility]     Hypertension     Lupus ([facility]        Past Surgical History:   Procedure Laterality Date    APPENDECTOMY      CARDIAC PACEMAKER PLACEMENT      CHOLECYSTECTOMY      ELBOW SURGERY Left     FEMUR FRACTURE SURGERY Left tablet 1 tablet  1 tablet Oral Q4H PRN Barbara Jimenes, APRN - CNP        Or    HYDROcodone-acetaminophen (NORCO)  MG per tablet 1 tablet  1 tablet Oral Q4H PRN Barbara Yudy, APRN - CNP        hydroxychloroquine (PLAQUENIL) tablet 100 mg  100 mg Oral BID Barbara Yudy, APRN - CNP   100 mg at 05/31/20 7397    lisinopril (PRINIVIL;ZESTRIL) tablet 20 mg  20 mg Oral Nightly Barbara Yudy, APRN - CNP   20 mg at 05/30/20 2032    magnesium oxide (MAG-OX) tablet 400 mg  400 mg Oral Daily Barbaranedra Jimenes, APRN - CNP   400 mg at 05/31/20 6963    methocarbamol (ROBAXIN) tablet 500 mg  500 mg Oral 4x Daily PRN Barbara Jimenes, APRN - CNP   500 mg at 05/30/20 0301    montelukast (SINGULAIR) tablet 10 mg  10 mg Oral Nightly Barbara Jimenes, APRN - CNP   10 mg at 05/30/20 2032    predniSONE (DELTASONE) tablet 40 mg  40 mg Oral Daily Barbaranedra Jimenes, APRN - CNP   40 mg at 05/31/20 4977    senna-docusate (PERICOLACE) 8.6-50 MG per tablet 2 tablet  2 tablet Oral BID Barbara Jimenes, APRN - CNP   2 tablet at 05/31/20 4839    sotalol (BETAPACE) tablet 120 mg  120 mg Oral BID Barbaranedra Jimenes, APRN - CNP   120 mg at 05/31/20 1912       Allergies   Allergen Reactions    Lipitor [Atorvastatin Calcium]      Leg cramping       Social History     Socioeconomic History    Marital status:       Spouse name: Not on file    Number of children: Not on file    Years of education: Not on file    Highest education level: Not on file   Occupational History    Not on file   Social Needs    Financial resource strain: Not hard at all    Food insecurity     Worry: Never true     Inability: Never true   [employer] needs     Medical: No     Non-medical: No   Tobacco Use    Smoking status: Former Smoker     Types: Cigarettes    Smokeless tobacco: Never Used   Substance and Sexual Activity    Alcohol use: Not Currently    Drug use: Never    Sexual activity: Not Currently     Partners: Male   Lifestyle    Physical activity Quadriceps:  4/5   Left Quadriceps:  2/5   Right Hamstrings:  4/5   Left Hamstrings:  2/5           Neurologic Exam     Mental Status   Oriented to person, place, and time. Speech: not slurred   Level of consciousness: alert  Knowledge: good. Cranial Nerves     CN III, IV, VI   Pupils are equal, round, and reactive to light. Motor Exam   Muscle bulk: decreased  Overall muscle tone: normal    Strength   Right neck flexion: 4/5  Left neck flexion: 4/5  Right neck extension: 4/5  Left neck extension: 4/5  Right deltoid: 4/5  Left deltoid: 4/5  Right biceps: 4/5  Left biceps: 4/5  Right triceps: 4/5  Left triceps: 4/5  Right wrist flexion: 4/5  Left wrist flexion: 4/5  Right wrist extension: 4/5  Left wrist extension: 4/5  Right interossei: 4/5  Left interossei: 4/5  Right abdominals: 4/5  Left abdominals: 4/5  Right iliopsoas: 4/5  Left iliopsoas: 1/5  Right quadriceps: 4/5  Left quadriceps: 2/5  Right hamstrin/5  Left hamstrin/5  Right glutei: 4/5  Left glutei: 4/5  Right anterior tibial: 4/5  Left anterior tibial: 4/5  Right posterior tibial: 4/5  Left posterior tibial: 4/5  Right peroneal: 4/5  Left peroneal: 4/5  Right gastroc: 4/5  Left gastroc: 4/5    Sensory Exam   Right arm light touch: decreased from fingers  Left arm light touch: decreased from fingers  Right leg light touch: decreased from ankle  Left leg light touch: decreased from ankle    Gait, Coordination, and Reflexes     Gait  Gait: wide-based    Coordination   Romberg: positive  Finger to nose coordination: abnormal    Reflexes   Right brachioradialis: 1+  Left brachioradialis: 1+  Right biceps: 1+  Left biceps: 1+  Right triceps: 1+  Left triceps: 1+  Right patellar: 1+  Left patellar: 0  Right achilles: 1+  Left achilles: 0  Right : 1+      After extensive review of the records and above physical exam, I have formulated the followingdiagnoses and plan:      DIAGNOSES:    1.   The patient was admitted to the acute rehabilitation unit with the primary rehab diagnoses being severe abnormality of gait and mobility andimpaired self care and ADL's due to Left femur complex intertrochanteric fracture. Compared to Pre-Admission Assessment, patients medical and functional status remain challengingly complex and patient continues to requireintensive therapeutic intervention from multiple therapies, therefore, initiate acute intensive comprehensive inpatient rehabilitation program including PT/OT to improve balance, ambulation, ADLs, and to improve the P/AROM. Functional and medical status reassessed regarding patients ability to participate in therapies and patient found to be able to participate in acute intensivecomprehensive inpatient rehabilitation program.  Therapeutic modifications regarding activities in therapies, place, amount of time per day and intensity of therapy made daily. Enroll in acute course of therapy program to include 2 hours per day of PT 5 to 7days per week and 1 hours per day of OT 5 to 7 days per week,  and Rec T 1/2 hour per day 3-5 days per week. The patient is stable medically and physically on previous exam.       This patient present with significant new onset decreased mobility andinability to perform activities of daily living skills independently and is at significant risk for prolonged disability  For this reason they have been admitted to [facility]. Thepatient's current functional and medical status are highly complex but the patient is able to participate in intensive rehabilitation. A comprehensive inpatient rehabilitation program is appropriate. The patient Rolena Graven initial evaluation by the rehabilitation team and be discussed at regular treatment team meetings to assess progress, mobility, self care, mood and discharge issues.   Physical therapy will be consulted for mobilityand endurance issues and should be performed 1 to 2 times per day, 7 days per week for the length of stay. Occupational therapy will be consulted for activities of daily living and should be performed 1 to 2 times per day,7 days per week for the length of stay. Their capacity to participate at an acute level, decision to be treated in the gym, room or on the unit, their activity goals for the day and the number of minutes of activeparticipation will be reassessed and re-prescribed daily. Because this patient is medically complex, I will check a CBC, BMP, UA and orthostatic blood pressures. They will be reassessed daily regarding their ability toparticipate in an acute level rehabilitation program.  Recreational Therapy will be consulted for community re-entry and adjustment to disability. Communication, cognitive and emotional issues will also be addressed duringthis rehabilitation stay by rehabilitation psychologist or speech therapist as appropriate. I reviewed the patient's old and current charts and discussed other rehabilitation options with the rehabilitation teamincluding the rehab RN and the admission team as well as the patient. I feel that the patient's functional recovery would be best served at an acute inpatient rehabilitation program because the patient needs intense therapythree hours per day, direct RN supervision and daily monitoring by a physician for medical status. This cannot be sufficiently provided by home health care, a skilled nursing facility or in an outpatient setting. I furtherfeel that the patient has the potential to improve functional abilities in an acute intensive rehabilitation program.    Old records were reviewed and summarized. 2.  Other diagnoses which complicate rehabilitation stay include:     Principal Problem:    Gait abnormality due to left intertrochanteric fracture s/p Intramedullary nailing with rehab admission 05/29/20. Active Problems:  1.    Closed intertrochanteric fracture of hip, left-status post intramedullary nailing and/or speech therapy as appropriate. I have encouraged the patient to attend the Rehab Adjustment to Disability Support Group and recreational therapy. 6.  Estimated length of stay is 2-2-1/2 weeks. Discharge to home with help from family and home health PT, OT, RN, and aide. Patient should be independent at discharge. 7.  The patient's medical and rehab prognosis are good. [address] regarding the patient's back up to general medical needs. A welcome letter was presented with an explanation of my services, my specialty and what to expect during the rehabilitation process. Aswell as introducing myself, I also wrote my name on their bedside marker board with their name as well as the names of the other physicians with an explanation of our individual roles in their care, as well as the rehab process.             Abad Tirado D.O., F.A.A.PAQUILES.&ART

## 2020-05-30 NOTE — PROGRESS NOTES
Patient sleeping at long intervals with oxygen at 2 liters. Up to [facility] with 1 assist x2. Continent. Medicated with scheduled Tylenol and Robaxin x1.

## 2020-05-30 NOTE — PROGRESS NOTES
recorded)  · Oral Nutrition Supplement (ONS) Orders: None  · Anthropometric Measures:  · Ht:  5'4\"  · Current Body Wt: (up in chair. Requested/ordered wt)  · Admission Body Wt: 160 lb (72.6 kg)(stated, initial admission on 5/25/20)  · Usual Body Wt: 157 lb (71.2 kg)(12/2/19-office visit, 159 lb (8/29/19) office visit)  · % Weight Change:  ,  UTD with stated wt  · Ideal Body Wt: 125 lb (56.7 kg), % Ideal Body > 100%  · BMI Classification: BMI 25.0 - 29.9 Overweight(26.6 if admission wt is accurate)    Nutrition Interventions:   Continue current diet, Start ONS(Continue General diet.   Start high calorie ONS at dinner)  Continued Inpatient Monitoring, Education Not Indicated    Nutrition Evaluation:   · Evaluation: Goals set   · Goals: po intake > 75% of meals and supplements    · Monitoring: Meal Intake, Supplement Intake, Weight, Pertinent Labs      Electronically signed by Wu Hawthorne RD, LD on 5/30/20 at 12:35 PM EDT

## 2020-05-30 NOTE — PLAN OF CARE
Nutrition Problem: Inadequate oral intake  Intervention: Food and/or Nutrient Delivery: Continue current diet, Start ONS(Continue General diet.   Start high calorie ONS at dinner)  Nutritional Goals: po intake > 75% of meals and supplements

## 2020-05-30 NOTE — PROGRESS NOTES
Facility/Department: [facility]  Rehabilitation Initial Assessment: Physical Therapy  Room: R255/R255-01    NAME: Mirta Damon  : 1935  MRN: [id number]    Date of Service: 2020    Rehab Diagnosis(es):Impaired mobility secondary to L intertrochanteric fx s/p intramedullary nailing  Patient Active Problem List    Diagnosis Date Noted    Abnormality of gait and mobility 2020    Atrial fibrillation ([address] 2020    Calculus of ureter 2020    Coronary atherosclerosis 2020    Reflux esophagitis 2020    Tobacco user 2020    Vitamin D deficiency 2020    Gait abnormality due to left intertrochanteric fracture s/p Intramedullary nailing with rehab admission 20. 2020    Hyponatremia 2020    Hyperkalemia 2020    Age-related osteoporosis with current pathological fracture with routine healing 2020    Pacemaker 2020    Age related osteoporosis 2020    Lupus ([address]     BMI 26.0-26.9,adult     Closed intertrochanteric fracture of hip, left, initial encounter ([address] 2020    Closed head injury     Chronic obstructive pulmonary disease, unspecified ([address] 10/30/2018    Obstructive sleep apnea (adult) (pediatric) 10/30/2018    Hyperlipidemia, unspecified 2018    Respiratory failure ([address] 2018    Cough 2018    Essential hypertension 2018    Sick sinus syndrome ([address] 2018    History of basal cell carcinoma 2018    Eczema 2017    Hammer toe 03/15/2016    Peripheral vascular disease ([address] 03/15/2016    Edema of toe 2016    Fatigue 2016       Past Medical History:   Diagnosis Date    COPD (chronic obstructive pulmonary disease) ([address]     Hypertension     Lupus ([address]      Past Surgical History:   Procedure Laterality Date    APPENDECTOMY      CARDIAC PACEMAKER PLACEMENT      CHOLECYSTECTOMY      ELBOW SURGERY Left     FEMUR FRACTURE SURGERY Left Independent(daughter cooks, cleans. patient does her own laundry 1st floor. She tosses a bag of laundry to the first floor from upstairs. 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, kitchen knives or other items that she gets from garage sales etc  Leisure & Hobbies: Enjoys garage sales, active in her [employer], enjoys country music and goes to [employer], belongs to the [employer]  Additional Comments: Family plans to move a bed to the first floor until patient can do stairs again    OBJECTIVE:   Vision/Hearing:  Vision Exceptions: Wears glasses for reading  Hearing: Within functional limits    Cognition:  Overall Orientation Status: Within Normal Limits  Follows Commands: Within Functional Limits  Observation/Palpation  Posture: Good  Observation: Patient sitting up upon arrival, on 2L O2 NC    ROM:  LLE General PROM: impaired hip flexor, HS and quad mm length noted and mm guarding    Strength:  Strength RLE  Strength RLE: WFL  Strength LLE  Comment: grossly 3/5    Neuro:  Sensation  Overall Sensation Status: WFL     Balance  Sitting - Static: Good  Sitting - Dynamic: Good  Standing - Static: Fair;-  Standing - Dynamic: Poor;+  Motor Control  Gross Motor?: WFL    Bed mobility  Rolling to Left: Stand by assistance  Rolling to Right: Stand by assistance  Supine to Sit: Minimal assistance  Sit to Supine:  Moderate assistance    Transfers  Sit to Stand: Contact guard assistance  Stand to sit: Contact guard assistance  Bed to Chair: Contact guard assistance  Stand Pivot medium    Prognosis: Good  PT Education: Goals;PT Role;Transfer Training;Precautions;Weight-bearing Education    CLINICAL IMPRESSION: Pt present s/p fall with IM nailing for repair of intertrochanteric hip fracture. Pt demonstrates the above deficits and decline in functional mobility status placing them at increased risk for falls. Pt would benefit from physical therapy to address above deficits and allow for safe return home at highest level of function, decrease risk for falls, and improve QOL.     PLAN OF CARE:  Frequency: 1-2 treatment sessions per day, 5-7 days per week     Current Treatment Recommendations: Strengthening, Transfer Training, Endurance Training, Neuromuscular Re-education, Patient/Caregiver Education & Training, Equipment Evaluation, Education, & procurement, Balance Training, Gait Training, Home Exercise Program, Functional Mobility Training, Stair training, Safety Education & Training, Pain Management    Patient's Goal:  to go home    GOALS:  Long term goals  Long term goal 1: Pt to be indep with bed mobility  Long term goal 2: Pt to be supervision with transfers maintaining PWB 25% LLE  Long term goal 3: Pt to ambulate 50 ft PWB 25% with [employer]  Long term goal 4: Pt will navigate 4 steps with handrail and approapriate AD to enter/exit home    ELOS:   Plan weeks: 2    Therapy Time:    Individual   Time In 0900   Time Out 0940   Minutes [address], [place], 05/30/20 at 12:01 PM

## 2020-05-31 PROBLEM — E11.65 TYPE 2 DIABETES MELLITUS WITH HYPERGLYCEMIA, WITHOUT LONG-TERM CURRENT USE OF INSULIN (HCC): Status: ACTIVE | Noted: 2020-05-31

## 2020-05-31 LAB
INR BLD: 1.2
PROTHROMBIN TIME: 15.4 SEC (ref 12.3–14.9)

## 2020-05-31 PROCEDURE — 36415 COLL VENOUS BLD VENIPUNCTURE: CPT

## 2020-05-31 PROCEDURE — 85610 PROTHROMBIN TIME: CPT

## 2020-05-31 PROCEDURE — 94640 AIRWAY INHALATION TREATMENT: CPT

## 2020-05-31 PROCEDURE — 1180000000 HC REHAB R&B

## 2020-05-31 PROCEDURE — 6370000000 HC RX 637 (ALT 250 FOR IP): Performed by: PHYSICAL MEDICINE & REHABILITATION

## 2020-05-31 PROCEDURE — 94761 N-INVAS EAR/PLS OXIMETRY MLT: CPT

## 2020-05-31 PROCEDURE — 99232 SBSQ HOSP IP/OBS MODERATE 35: CPT | Performed by: PHYSICAL MEDICINE & REHABILITATION

## 2020-05-31 PROCEDURE — 2700000000 HC OXYGEN THERAPY PER DAY

## 2020-05-31 PROCEDURE — 6370000000 HC RX 637 (ALT 250 FOR IP): Performed by: NURSE PRACTITIONER

## 2020-05-31 RX ADMIN — Medication 100 MG: at 09:29

## 2020-05-31 RX ADMIN — SOTALOL HYDROCHLORIDE 120 MG: 120 TABLET ORAL at 21:16

## 2020-05-31 RX ADMIN — TIOTROPIUM BROMIDE INHALATION SPRAY 2 PUFF: 3.12 SPRAY, METERED RESPIRATORY (INHALATION) at 04:44

## 2020-05-31 RX ADMIN — DOCUSATE SODIUM 50MG AND SENNOSIDES 8.6MG 2 TABLET: 8.6; 5 TABLET, FILM COATED ORAL at 09:28

## 2020-05-31 RX ADMIN — PANTOPRAZOLE SODIUM 20 MG: 20 TABLET, DELAYED RELEASE ORAL at 09:30

## 2020-05-31 RX ADMIN — ACETAMINOPHEN 650 MG: 325 TABLET ORAL at 09:29

## 2020-05-31 RX ADMIN — LISINOPRIL 20 MG: 20 TABLET ORAL at 21:16

## 2020-05-31 RX ADMIN — BUDESONIDE AND FORMOTEROL FUMARATE DIHYDRATE 2 PUFF: 80; 4.5 AEROSOL RESPIRATORY (INHALATION) at 04:44

## 2020-05-31 RX ADMIN — Medication 400 MG: at 09:29

## 2020-05-31 RX ADMIN — HYDROXYCHLOROQUINE SULFATE 100 MG: 200 TABLET, FILM COATED ORAL at 09:29

## 2020-05-31 RX ADMIN — WARFARIN SODIUM 5 MG: 5 TABLET ORAL at 17:24

## 2020-05-31 RX ADMIN — BUDESONIDE AND FORMOTEROL FUMARATE DIHYDRATE 2 PUFF: 80; 4.5 AEROSOL RESPIRATORY (INHALATION) at 15:58

## 2020-05-31 RX ADMIN — SOTALOL HYDROCHLORIDE 120 MG: 120 TABLET ORAL at 09:29

## 2020-05-31 RX ADMIN — Medication 100 MG: at 12:38

## 2020-05-31 RX ADMIN — PREDNISONE 40 MG: 20 TABLET ORAL at 09:29

## 2020-05-31 RX ADMIN — HYDROXYCHLOROQUINE SULFATE 100 MG: 200 TABLET, FILM COATED ORAL at 21:20

## 2020-05-31 RX ADMIN — DOCUSATE SODIUM 50MG AND SENNOSIDES 8.6MG 2 TABLET: 8.6; 5 TABLET, FILM COATED ORAL at 21:16

## 2020-05-31 RX ADMIN — MONTELUKAST 10 MG: 10 TABLET, FILM COATED ORAL at 21:16

## 2020-05-31 RX ADMIN — ACETAMINOPHEN 650 MG: 325 TABLET ORAL at 17:24

## 2020-05-31 RX ADMIN — ACETAMINOPHEN 650 MG: 325 TABLET ORAL at 21:19

## 2020-05-31 RX ADMIN — VITAMIN D, TAB 1000IU (100/BT) 2000 UNITS: 25 TAB at 17:24

## 2020-05-31 RX ADMIN — ACETAMINOPHEN 650 MG: 325 TABLET ORAL at 02:10

## 2020-05-31 ASSESSMENT — PAIN SCALES - GENERAL
PAINLEVEL_OUTOF10: 5
PAINLEVEL_OUTOF10: 1
PAINLEVEL_OUTOF10: 0
PAINLEVEL_OUTOF10: 1
PAINLEVEL_OUTOF10: 3
PAINLEVEL_OUTOF10: 0

## 2020-05-31 ASSESSMENT — PAIN DESCRIPTION - PROGRESSION
CLINICAL_PROGRESSION: GRADUALLY IMPROVING
CLINICAL_PROGRESSION: GRADUALLY IMPROVING

## 2020-05-31 NOTE — PLAN OF CARE
Problem: Falls - Risk of:  Goal: Will remain free from falls  Description: Will remain free from falls  Outcome: Ongoing  Goal: Absence of physical injury  Description: Absence of physical injury  Outcome: Ongoing     Problem: ABCDS Injury Assessment  Goal: Absence of physical injury  Outcome: Ongoing     Problem: Pain:  Goal: Pain level will decrease  Description: Pain level will decrease  Outcome: Ongoing  Goal: Control of acute pain  Description: Control of acute pain  Outcome: Ongoing  Goal: Control of chronic pain  Description: Control of chronic pain  Outcome: Ongoing     Problem: Mobility - Impaired:  Goal: Mobility will improve  Description: Mobility will improve  Outcome: Ongoing     Problem: Nutrition  Goal: Optimal nutrition therapy  Outcome: Ongoing

## 2020-05-31 NOTE — PROGRESS NOTES
spleen. Extremities:  No significant lower extremity edema or tenderness. Skin:   Intact to general survey, left hip incision healing    Rehabilitation:  Physical therapy: FIMS:  Bed Mobility:      Transfers: Sit to Stand: Stand by assistance  Stand to sit: Stand by assistance  Bed to Chair: Minimal assistance, Contact guard assistance(to/from therapy mat), Ambulation 1  Surface: level tile  Device: Rolling Walker  Other Apparatus: O2(2L)  Assistance: Contact guard assistance  Quality of Gait: hop-to pattern, short steps with decreased step clearance  Distance: 35ft x1 standing RB D/T SOB, straight path  Comments: good ability to maintain 25% WB status w/ walker low allowing B arms to extend/ support weight ,      FIMS:  ,  , Assessment: Pt cont's to isabella further distance w/ amb in PM in addition to 90 turns to Lt. Pt req frequent RB's during supine ther ex D/T inc fatigue and SOB; modified w/ wedge and pillow props to improve breathing. Spo2 ranging from 94-98% w/ activity. Stretches and manual performed to improve Lt hip mobility/tightness s/p sx. Occupational therapy: FIMS:   ,  ,      Speech therapy: FIMS:        Lab/X-ray studies reviewed, analyzed and discussed with patient and staff:   Recent Results (from the past 24 hour(s))   Basic Metabolic Panel w/ Reflex to MG    Collection Time: 05/30/20  5:40 PM   Result Value Ref Range    Sodium 132 (L) 135 - 144 mEq/L    Potassium reflex Magnesium 5.2 (H) 3.4 - 4.9 mEq/L    Chloride 93 (L) 95 - 107 mEq/L    CO2 29 20 - 31 mEq/L    Anion Gap 10 9 - 15 mEq/L    Glucose 170 (H) 70 - 99 mg/dL    BUN 13 8 - 23 mg/dL    CREATININE 0.66 0.50 - 0.90 mg/dL    GFR Non-African American >60.0 >60    GFR  >60.0 >60    Calcium 9.4 8.5 - 9.9 mg/dL   Protime-INR    Collection Time: 05/31/20  5:02 AM   Result Value Ref Range    Protime 15.4 (H) 12.3 - 14.9 sec    INR 1.2        Xr Chest  : 5/26/2020    2 views of the chest is submitted. Left-sided CCD device. Leads overlying the cardiac silhouette. Unchanged  The cardiac silhouette is of normal size configuration. Pulmonary vascular attenuated. Lung fields are hyperinflated. Coarse interstitium. Right sided trachea. No focal infiltrates. No Pneumothoraces. NO ACUTE ACTIVE CARDIOPULMONARY PROCESS. RADIOGRAPHIC FINDINGS OF COPD    Xr Chest : 5/26/2020   : Lines, tubes, and devices:  Left transvenous pacemaker with leads in the region of the right atrium and right ventricle, unchanged. Lungs and pleura:  Emphysematous changes. No focal consolidation. No pleural effusion. No pneumothorax. Normal pulmonary vascular pattern. Cardiomediastinal silhouette:  Normal. Aortic atherosclerotic calcification. Other:  No acute osseous findings. Surgical clips right upper quadrant. No acute radiographic abnormality or significant interval change. Emphysema    Xr Femur Left  : 5/26/2020    Acute complex left proximal femur intertrochanteric fracture, with fracture line extending to involve both the greater and lesser trochanters, with multiple small fracture fragments at the fracture sites and displacement measuring up to 15 mm. Alignment of left hip appears maintained. Degenerative changes lower lumbar spine and SI joints. Right hip unremarkable. No other fracture in the distal femur on the dedicated left femur radiographs. Alignment is grossly maintained. No other significant abnormality. Left femur complex intertrochanteric fracture. Ct Head  : 5/25/2020    An unenhanced CT scan of the brain demonstrates no evidence of a skull fracture. There is cerebral atrophy and age related findings in the brain. There is no acute CVA. There is no acute intra-axial or extra-axial findings in the brain. There is no hydrocephalus, intracranial mass, hemorrhage, \"mass effect\" or midline shift.   The remainder of the CT scan of the brain appears unremarkable. 1. CEREBRAL ATROPHY AND AGE RELATED FINDINGS IN THE BRAIN. 2. NO ACUTE INTRA-AXIAL OR EXTRA-AXIAL FINDINGS IN THE BRAIN. Ct Cervical Spine : 5/25/2020   There is no prevertebral soft tissue swelling. 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 There is cervical spondylosis with degenerative bone spurring and posterior facet arthritis at multiple cervical vertebral levels. There is uncovertebral joint arthropathy throughout the cervical spine. 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 There is no fracture, subluxation or dislocation involving the cervical spine. There are no osteolytic or osteoblastic lesion in the cervical spine. No acute traumatic bone injury involving the cervical spine. 1.  DEGENERATIVE AND ARTHRITIC CHANGES THROUGHOUT THE C-SPINE AS DESCRIBED. 2.  NO ACUTE FRACTURE, SUBLUXATION OR DISLOCATION INVOLVING THE CERVICAL SPINE. Xr Chest   5/28/2020   FINDINGS: Single  views of the chest is submitted. Left-sided CCD device. Leads overlying the cardiac silhouette. Unchanged The cardiac silhouette is of normal size configuration. . Pulmonary vascular unremarkable. Right sided trachea. No focal infiltrates. No Pneumothoraces. Old healed right humeral head fracture                                                                                   NO ACUTE ACTIVE CARDIOPULMONARY PROCESS    Fluoro For Surgical  5/27/2020  EXAMINATION:  FLUORO FOR SURGICAL PROCEDURES CLINICAL HISTORY:  ORIF Left Femur COMPARISONS:  Left femur radiographs 5/26/2020 TECHNIQUE: Intraoperative fluoroscopy. Fluoroscopic time 34.9 seconds. 9 screen capture images. FINDINGS:  Submitted screen capture images document left femoral TFN fixation in near-anatomic alignment. See operative report for correlation.      FLUOROSCOPIC INTRAOPERATIVE IMAGING ASSISTANCE FOR LEFT HIP TFN ORIF     Xr Hip well as generalized OA pain: reassess pain every shift and prior to and after each therapy session, give prn Tylenol and titrating doses of Norco always using lowest effective dose, modalities prn in therapy, Lidoderm, K-pad prn. Patient is on daily Deltasone for her lungs causing osteoporosis  4. Skin healing and breakdown risk:  continue pressure relief program.  Daily skin exams and reports from nursing. 5. Severe fatigue due to nutritional and hydration deficiency: Add vitamin B12 vitamin D and CoQ10 continue to monitor I&Os, calorie counts prn, dietary consult prn.  6. Acute episodic insomnia with situational adjustment disorder:  prn Ambien, monitor for day time sedation. 7. Falls risk elevated:  patient to use call light to get nursing assistance to get up, bed and chair alarm. 8. Elevated DVT risk: progressive activities in PT, continue prophylaxis ALHAJI hose, elevation and Coumadin. 9. Complex discharge planning:  Weekly team meeting every Monday to assess progress towards goals, discuss and address social, psychological and medical comorbidities and to address difficulties they may be having progressing in therapy. Patient and family education is in progress. The patient is to follow-up with their family physician after discharge. Complex Active General Medical Issues that complicate care Assess & Plan:    1. Closed intertrochanteric fracture of hip, left-status post intramedullary nailing fracture was due to a fall follow-up with orthopedics weightbearing per orthopedics  2. Chronic obstructive pulmonary disease,   Respiratory failure, Obstructive sleep apnea (adult) -pulse ox checks to shift encourage use of CPAP prescribed weight loss add aerosol treatments daily Deltasone for her lungs and lupus  3.    Hyperlipidemia,   Atrial fibrillation, Coronary atherosclerosis, Essential hypertension,   Peripheral vascular disease, Sick sinus syndrome-vital signs every shift dose and titrate

## 2020-06-01 LAB
BACTERIA: ABNORMAL /HPF
BILIRUBIN URINE: NEGATIVE
BLOOD, URINE: NEGATIVE
CLARITY: ABNORMAL
COLOR: YELLOW
EPITHELIAL CELLS, UA: ABNORMAL /HPF (ref 0–5)
GLUCOSE URINE: NEGATIVE MG/DL
HBA1C MFR BLD: 5.3 % (ref 4.8–5.9)
HYALINE CASTS: ABNORMAL /HPF (ref 0–5)
INR BLD: 1.4
KETONES, URINE: NEGATIVE MG/DL
LEUKOCYTE ESTERASE, URINE: ABNORMAL
NITRITE, URINE: NEGATIVE
PH UA: 7 (ref 5–9)
PROTEIN UA: NEGATIVE MG/DL
PROTHROMBIN TIME: 17.4 SEC (ref 12.3–14.9)
RBC UA: ABNORMAL /HPF (ref 0–2)
SPECIFIC GRAVITY UA: 1.01 (ref 1–1.03)
UROBILINOGEN, URINE: 1 E.U./DL
WBC UA: >100 /HPF (ref 0–5)

## 2020-06-01 PROCEDURE — 97140 MANUAL THERAPY 1/> REGIONS: CPT

## 2020-06-01 PROCEDURE — 6370000000 HC RX 637 (ALT 250 FOR IP): Performed by: PHYSICAL MEDICINE & REHABILITATION

## 2020-06-01 PROCEDURE — 94761 N-INVAS EAR/PLS OXIMETRY MLT: CPT

## 2020-06-01 PROCEDURE — 97110 THERAPEUTIC EXERCISES: CPT

## 2020-06-01 PROCEDURE — 6370000000 HC RX 637 (ALT 250 FOR IP): Performed by: NURSE PRACTITIONER

## 2020-06-01 PROCEDURE — 36415 COLL VENOUS BLD VENIPUNCTURE: CPT

## 2020-06-01 PROCEDURE — 2700000000 HC OXYGEN THERAPY PER DAY

## 2020-06-01 PROCEDURE — 97535 SELF CARE MNGMENT TRAINING: CPT

## 2020-06-01 PROCEDURE — 83036 HEMOGLOBIN GLYCOSYLATED A1C: CPT

## 2020-06-01 PROCEDURE — 99232 SBSQ HOSP IP/OBS MODERATE 35: CPT | Performed by: INTERNAL MEDICINE

## 2020-06-01 PROCEDURE — 99233 SBSQ HOSP IP/OBS HIGH 50: CPT | Performed by: PHYSICAL MEDICINE & REHABILITATION

## 2020-06-01 PROCEDURE — 87086 URINE CULTURE/COLONY COUNT: CPT

## 2020-06-01 PROCEDURE — 1180000000 HC REHAB R&B

## 2020-06-01 PROCEDURE — 87186 SC STD MICRODIL/AGAR DIL: CPT

## 2020-06-01 PROCEDURE — 85610 PROTHROMBIN TIME: CPT

## 2020-06-01 PROCEDURE — 81001 URINALYSIS AUTO W/SCOPE: CPT

## 2020-06-01 PROCEDURE — 97116 GAIT TRAINING THERAPY: CPT

## 2020-06-01 PROCEDURE — 94640 AIRWAY INHALATION TREATMENT: CPT

## 2020-06-01 PROCEDURE — 87077 CULTURE AEROBIC IDENTIFY: CPT

## 2020-06-01 RX ADMIN — ACETAMINOPHEN 650 MG: 325 TABLET ORAL at 21:38

## 2020-06-01 RX ADMIN — PREDNISONE 40 MG: 20 TABLET ORAL at 08:43

## 2020-06-01 RX ADMIN — TIOTROPIUM BROMIDE INHALATION SPRAY 2 PUFF: 3.12 SPRAY, METERED RESPIRATORY (INHALATION) at 04:18

## 2020-06-01 RX ADMIN — Medication 100 MG: at 11:57

## 2020-06-01 RX ADMIN — WARFARIN SODIUM 5 MG: 5 TABLET ORAL at 17:27

## 2020-06-01 RX ADMIN — SOTALOL HYDROCHLORIDE 120 MG: 120 TABLET ORAL at 21:41

## 2020-06-01 RX ADMIN — HYDROXYCHLOROQUINE SULFATE 100 MG: 200 TABLET, FILM COATED ORAL at 21:40

## 2020-06-01 RX ADMIN — PANTOPRAZOLE SODIUM 20 MG: 20 TABLET, DELAYED RELEASE ORAL at 05:59

## 2020-06-01 RX ADMIN — Medication 400 MG: at 08:43

## 2020-06-01 RX ADMIN — ACETAMINOPHEN 650 MG: 325 TABLET ORAL at 08:43

## 2020-06-01 RX ADMIN — HYDROXYCHLOROQUINE SULFATE 100 MG: 200 TABLET, FILM COATED ORAL at 08:43

## 2020-06-01 RX ADMIN — MONTELUKAST 10 MG: 10 TABLET, FILM COATED ORAL at 21:42

## 2020-06-01 RX ADMIN — DOCUSATE SODIUM 50MG AND SENNOSIDES 8.6MG 2 TABLET: 8.6; 5 TABLET, FILM COATED ORAL at 08:43

## 2020-06-01 RX ADMIN — Medication 100 MG: at 08:43

## 2020-06-01 RX ADMIN — ACETAMINOPHEN 650 MG: 325 TABLET ORAL at 15:26

## 2020-06-01 RX ADMIN — VITAMIN D, TAB 1000IU (100/BT) 2000 UNITS: 25 TAB at 17:27

## 2020-06-01 RX ADMIN — ACETAMINOPHEN 650 MG: 325 TABLET ORAL at 03:32

## 2020-06-01 RX ADMIN — LISINOPRIL 20 MG: 20 TABLET ORAL at 22:47

## 2020-06-01 RX ADMIN — BUDESONIDE AND FORMOTEROL FUMARATE DIHYDRATE 2 PUFF: 80; 4.5 AEROSOL RESPIRATORY (INHALATION) at 04:18

## 2020-06-01 RX ADMIN — BUDESONIDE AND FORMOTEROL FUMARATE DIHYDRATE 2 PUFF: 80; 4.5 AEROSOL RESPIRATORY (INHALATION) at 16:40

## 2020-06-01 RX ADMIN — SOTALOL HYDROCHLORIDE 120 MG: 120 TABLET ORAL at 08:43

## 2020-06-01 ASSESSMENT — PAIN DESCRIPTION - DESCRIPTORS
DESCRIPTORS: ACHING
DESCRIPTORS: ACHING

## 2020-06-01 ASSESSMENT — PAIN DESCRIPTION - FREQUENCY
FREQUENCY: INTERMITTENT
FREQUENCY: INTERMITTENT

## 2020-06-01 ASSESSMENT — PAIN SCALES - GENERAL
PAINLEVEL_OUTOF10: 4
PAINLEVEL_OUTOF10: 1
PAINLEVEL_OUTOF10: 0
PAINLEVEL_OUTOF10: 3
PAINLEVEL_OUTOF10: 5
PAINLEVEL_OUTOF10: 4
PAINLEVEL_OUTOF10: 5
PAINLEVEL_OUTOF10: 0

## 2020-06-01 ASSESSMENT — PAIN DESCRIPTION - PAIN TYPE
TYPE: ACUTE PAIN
TYPE: ACUTE PAIN

## 2020-06-01 ASSESSMENT — PAIN DESCRIPTION - PROGRESSION: CLINICAL_PROGRESSION: NOT CHANGED

## 2020-06-01 ASSESSMENT — PAIN DESCRIPTION - LOCATION
LOCATION: HIP
LOCATION: HIP

## 2020-06-01 ASSESSMENT — PAIN DESCRIPTION - ORIENTATION
ORIENTATION: LEFT
ORIENTATION: LEFT

## 2020-06-01 NOTE — PLAN OF CARE
Problem: Falls - Risk of:  Goal: Will remain free from falls  Description: Will remain free from falls  6/1/2020 0025 by Alberto Zepeda RN  Outcome: Ongoing  5/31/2020 1548 by Jaci Goodwin RN  Outcome: Ongoing  Goal: Absence of physical injury  Description: Absence of physical injury  6/1/2020 0025 by Alberto Zepeda RN  Outcome: Ongoing  5/31/2020 1548 by Jaci Goodwin RN  Outcome: Ongoing     Problem: ABCDS Injury Assessment  Goal: Absence of physical injury  6/1/2020 0025 by Alberto Zepeda RN  Outcome: Ongoing  5/31/2020 1548 by Jaci Goodwin RN  Outcome: Ongoing     Problem: Pain:  Goal: Pain level will decrease  Description: Pain level will decrease  6/1/2020 0025 by Alberto Zepeda RN  Outcome: Ongoing  5/31/2020 1548 by Jaci Goodwin RN  Outcome: Ongoing  Goal: Control of acute pain  Description: Control of acute pain  6/1/2020 0025 by Alberto Zepeda RN  Outcome: Ongoing  5/31/2020 1548 by Jaci Goodwin RN  Outcome: Ongoing  Goal: Control of chronic pain  Description: Control of chronic pain  6/1/2020 0025 by Alberto Zepeda RN  Outcome: Ongoing  5/31/2020 1548 by Jaci Goodwin RN  Outcome: Ongoing     Problem: Mobility - Impaired:  Goal: Mobility will improve  Description: Mobility will improve  6/1/2020 0025 by Alberto Zepeda RN  Outcome: Ongoing  5/31/2020 1548 by Jaci Goodwin RN  Outcome: Ongoing     Problem: Nutrition  Goal: Optimal nutrition therapy  5/31/2020 1548 by Jaci Goodwin RN  Outcome: Ongoing

## 2020-06-01 NOTE — PROGRESS NOTES
INPATIENT PROGRESS NOTES    PATIENT NAME: Navjot Stroud  MRN: [id number]  SERVICE DATE:  June 1, 2020   SERVICE TIME:  7:28 PM      PRIMARY SERVICE: Pulmonary Disease    CHIEF COMPLAIN: Cough      INTERVAL HPI: Patient seen and examined at bedside, Interval Notes, orders reviewed. Nursing notes noted  Patient states she is feeling better. She does have a minimal cough. Mild exertional shortness of breath. She is trying to use CPAP at night. Currently on O2 via nasal cannula 2 L and O2 saturation 98%. No chest pain no pleuritic pain no fever or chills. OBJECTIVE    Body mass index is 21.25 kg/m². PHYSICAL EXAM:  Vitals:  BP (!) 157/71   Pulse 63   Temp 97 °F (36.1 °C) (Oral)   Resp 17   Ht 5' 5\" (1.651 m)   Wt 127 lb 11.2 oz (57.9 kg)   LMP  (LMP Unknown)   SpO2 98%   BMI 21.25 kg/m²   General: Alert, awake . comfortable in bed, No distress. appears stated age and cooperative  Head: Atraumatic , Normocephalic   Eyes: PERRL. No sclera icterus. No conjunctival injection. No discharge   ENT: No nasal  discharge. Pharynx clear. lips, teeth, mucosa and gums are normal, tongue protrudes in the midline  Neck:  Trachea midline. No thyromegaly, no JVD, No cervical adenopathy. Chest : Bilaterally symmetrical ,Normal effort,  No accessory muscle use  Lung : . Fair BS bilateral, decreased BS at bases. No Rales. No wheezing. No rhonchi. No dullness on percussion. Heart[de-identified] Normal  rate. Regular rhythm. No mumur ,  Rub or gallop  ABD: Non-tender. Non-distended. No masses. No organmegaly. Normal bowel sounds. No hernia. Ext : No Pitting both leg , No Cyanosis No clubbing  Neuro: no focal weakness          DATA:   No results for input(s): WBC, HGB, HCT, MCV, PLT in the last 72 hours.   Recent Labs     05/30/20  1740   *   K 5.2*   CL 93*   CO2 29   BUN 13   CREATININE 0.66   GLUCOSE 170*   CALCIUM 9.4   LABGLOM >60.0   GFRAA >60.0       MV Settings:          No results for input(s): PHART, XGO1ZOE, PO2ART, pacemaker with leads in the region of the right atrium and right ventricle, unchanged. Lungs and pleura:  Emphysematous changes. No focal consolidation. No pleural effusion. No pneumothorax. Normal pulmonary vascular pattern. Cardiomediastinal silhouette:  Normal. Aortic atherosclerotic calcification. Other:  No acute osseous findings. Surgical clips right upper quadrant. No acute radiographic abnormality or significant interval change. Emphysema    Xr Femur Left (min 2 Views)    Result Date: 5/26/2020  EXAMINATION:  XR FEMUR LEFT (MIN 2 VIEWS), XR HIP 2-3 VW W PELVIS LEFT HISTORY:   PAIN    Fall . Tripped over nebulizer tubing, fell on left hip. Left hip pain. TECHNIQUE:  XR FEMUR LEFT (MIN 2 VIEWS), XR HIP 2-3 VW W PELVIS LEFT COMPARISON: Correlation with abdominal radiographs from 3/1/2012. RESULT: Acute complex left proximal femur intertrochanteric fracture, with fracture line extending to involve both the greater and lesser trochanters, with multiple small fracture fragments at the fracture sites and displacement measuring up to 15 mm. Alignment of left hip appears maintained. Degenerative changes lower lumbar spine and SI joints. Right hip unremarkable. No other fracture in the distal femur on the dedicated left femur radiographs. Alignment is grossly maintained. No other significant abnormality. ---------------------------------------------     Left femur complex intertrochanteric fracture. Ct Head Wo Contrast    Result Date: 5/25/2020  EXAM: CT SCAN OF THE BRAIN WITHOUT CONTRAST COMPARISON: 5/3/2013 REASONS FOR EXAMINATION:     TRAUMA, HEAD INJURY IN FALL, PATIENT STRUCK HEAD IN FALL, HEADACHE TECHNIQUE:  CT brain is obtained without IV contrast agent. FINDINGS: An unenhanced CT scan of the brain demonstrates no evidence of a skull fracture. There is cerebral atrophy and age related findings in the brain. There is no acute CVA. There is no acute intra-axial or extra-axial findings in the brain.

## 2020-06-01 NOTE — PROGRESS NOTES
Subjective: The patient complains of severe  acute left hip fracture partially relieved by rest, ice, opiate medication list so relieved by plain Tylenol and exacerbated by weightbearing. I am concerned about patients bleeding risk on Coumadin. I am also concerned about her slightly low sodium, her elevated potassium and her elevated blood sugars     I have also been concerned of her noncompliance of CPAP according to last night's nurse,  patient refuses her Cpap/Bipap at night bc her son is bringing her mask she doesn't like the one in the room, states its uncomfortable for her. I have encouraged her to get her own mask from home and educated her as to the fact that she can ask her provider for a nasal pillow type mask if she so desires. The patient was taken to the bathroom and very SOB so NC 3L O2 applied. The patient calls appropriately for the use of the bed side commode. But seems to be more frequent;y than usual. Her last urine was trace changes but with the frequency and pt states some discomfort. Put in for a recheck on her urine. Her las labs showed an increase of K of 5.2 and decrease of . Pt is to f/u with Dr. Giselle Lees for left hip.after discharge. I will recheck check a BMP to see where her electrolytes are trending. ROS x10: The patient also complains of severely impaired mobility and activities of daily living. Otherwise no new problems with vision, hearing, nose, mouth, throat, dermal, cardiovascular, GI, , pulmonary, musculoskeletal, psychiatric or neurological. See Rehab H&P on Rehab chart dated . Vital signs:  BP (!) 157/71   Pulse 63   Temp 97 °F (36.1 °C) (Oral)   Resp 17   Ht 5' 5\" (1.651 m)   Wt 127 lb 11.2 oz (57.9 kg)   LMP  (LMP Unknown)   SpO2 100%   BMI 21.25 kg/m²   I/O:   PO/Intake:  fair PO intake, no problems observed or reported.     Bowel/Bladder:  continent, frequent nocturnal urination  General:  Patient is well developed, adequately ACUTE FRACTURE, SUBLUXATION OR DISLOCATION INVOLVING THE CERVICAL SPINE. Xr Chest   5/28/2020   FINDINGS: Single  views of the chest is submitted. Left-sided CCD device. Leads overlying the cardiac silhouette. Unchanged The cardiac silhouette is of normal size configuration. . Pulmonary vascular unremarkable. Right sided trachea. No focal infiltrates. No Pneumothoraces. Old healed right humeral head fracture                                                                                   NO ACUTE ACTIVE CARDIOPULMONARY PROCESS    Fluoro For Surgical  5/27/2020  EXAMINATION:  FLUORO FOR SURGICAL PROCEDURES CLINICAL HISTORY:  ORIF Left Femur COMPARISONS:  Left femur radiographs 5/26/2020 TECHNIQUE: Intraoperative fluoroscopy. Fluoroscopic time 34.9 seconds. 9 screen capture images. FINDINGS:  Submitted screen capture images document left femoral TFN fixation in near-anatomic alignment. See operative report for correlation. FLUOROSCOPIC INTRAOPERATIVE IMAGING ASSISTANCE FOR LEFT HIP TFN ORIF     Xr Hip 2-3 Vw W Pelvis Left : 5/26/2020    Acute complex left proximal femur intertrochanteric fracture, with fracture line extending to involve both the greater and lesser trochanters, with multiple small fracture fragments at the fracture sites and displacement measuring up to 15 mm. Alignment of left hip appears maintained. Degenerative changes lower lumbar spine and SI joints. Right hip unremarkable. No other fracture in the distal femur on the dedicated left femur radiographs. Alignment is grossly maintained. No other significant abnormality. Left femur complex intertrochanteric fracture. Previous extensive, complex labs, notes and diagnostics reviewed and analyzed.      ALLERGIES:    Allergies as of 05/29/2020 - Review Complete 05/29/2020   Allergen Reaction Noted    Lipitor [atorvastatin calcium]  05/25/2020      (please also verify by checking MAR)     Today I evaluated this patient for periodic signs every shift dose and titrate cardiac medications to include Coumadin, Prinivil, Betapace, consult hospitalist for backup medical  4. Tobacco user-stop smoking counseling-add NicoDerm  5. Vitamin D deficiency-add vitamin D treat for osteoporosis  6. Hyponatremia, Hyperkalemia-supplement accordingly recheck BMP-consider holding ACE inhibitor  7. Whole body arthritis and inflammation due to Lupus-monitor for blood clots patient is on Coumadin she is on daily Deltasone for her lungs and lupus as well as Plaquenil  8.  Steroid-induced type 2 diabetes-limit steroids to lowest effective dose add carbohydrate restriction of the diet check hemoglobin A1c        Bhupinder Denise D.O., PM&R     Attending    [facility] [place]

## 2020-06-01 NOTE — PROGRESS NOTES
breaks. ASSESSMENT/COMMENTS:tolerated tx well and without much complaint of pain except when left leg is moved during rom activity. Pt unable to get left leg off bed/mat on her own at this time. Pt c/o about her steps at home to get in and wonders if she can manage them. PLAN OF CARE/Safety: ongoing.          Therapy Time:   Individual   Time In 1030   Time Out 1130   Minutes 60     Minutes:60      Transfer/Bed mobility trainin      Gait training:10      Neuro re education:0     Therapeutic ex:30      Lia Vargas PTA, 20 at 11:13 AM

## 2020-06-01 NOTE — CARE COORDINATION
Social/Functional Status:  Social/Functional History  Lives With: Daughter(son in law and grandson (28 year old))  Type of Home: House  Home Layout: Two level, Bed/Bath upstairs(there is a bathroom on the first floor that usually is not used but patient reported that her family will get it ready for her. has a tub/shower combo)  Home Access: Stairs to enter with rails  Entrance Stairs - Number of Steps: 4-5  Entrance Stairs - Rails: Both  Bathroom Shower/Tub: Tub/Shower unit, Doors  Bathroom Equipment: Shower chair, Grab bars in shower, Hand-held shower, Grab bars around toilet(suction cup grab bars in upstairs tub and a rail around the toilet on the first floor)  Home Equipment: Rolling walker, Cane(hurri cane,)  ADL Assistance: Independent  Homemaking Assistance: Independent(daughter cooks, cleans. patient does her own laundry 1st floor. She tosses a bag of laundry to the first floor from upstairs. If she has a lot of laundry her grandson will help her carry it but she likes to be independent)  Ambulation Assistance: Independent(no AD)  Transfer Assistance: Independent  Active : Yes  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 Two story home with 4-5 steps into home, bathroom on main floor being fixed up by family and patient thinking of staying on main level to sleep.  Independent prior to admit, family assists with housecleaning/heavy work. Patient works part-time at [employer]. Patient questioning whether she will need ramp at home upon discharge, will follow with therapy to determine needs. No further questions at this time. Electronically signed by Tiera Jones RN on 6/1/20 at 12:18 PM EDT    Met with patient and daughter after team, informed of planned discharge date of 6/10 and in agreement. Team recommending [facility], list provided for freedom of choice.  Electronically signed by Tiera Jones RN on 6/1/20 at 5:04 PM EDT

## 2020-06-01 NOTE — PROGRESS NOTES
Physical Therapy  Facility/Department: [facility] MED SURG [id number]  Physical Therapy Discharge      NAME: Jose Carlos Ralph    : 1935 (81 y.o.)  MRN: [id number]    Account: [de-identified]  Gender: female      Patient has been discharged from acute care hospital. DC patient from current PT program.      Electronically signed by Victoriano Haynes PT on 20 at 8:18 AM EDT

## 2020-06-01 NOTE — PROGRESS NOTES
INDIVIDUALIZED OVERALL REHAB PLAN OF CARE  ADDENDUM TO REHAB PROGRESS NOTE-for audit purposes must also refer to this day's clinical note and combine the information      Date: 2020  Patient Name: Navjot Stroud   Room: Z891/D423-69    MRN: [id number]    : 1935  (81 y.o.)  Gender: female       Today 2020 during weekly team meeting, I reviewed the patient Navjot Stroud in detail with the therapists and nurses involved in patient's care gathering complex physiatric data regarding current medical issues, progress in therapies, factors limiting progress, social issues, psychological issues, ongoing therapeutic plans and discharge planning. Legend:  I= independent Im =Modified independent  S=Supervised SB=stand by BRIZUELA=set up CG=contact david Min= minimal Mod=Moderate Max=maximal Max of 2 =maximal assist of 2 people      CURRENT FUNCTIONAL STATUS:    NURSING ISSUES:       Occasional to soft stools are loose bowel movements I have held her Colace. Recheck for BMP is pending for tomorrow is pending  Nursing will continue to focus on bowel and bladder continence transitioning toward independence by time of discharge. Monitoring post void residuals monitoring for severe constipation and bowel obstruction. Focus on achieving ADL goals with co-treating with OT when possible.     PHYSICAL THERAPY  Bed mobility:  Supine to Sit: Stand by assistance (20)  Sit to Supine: Stand by assistance (20)  Transfers:  Sit to Stand: Stand by assistance (20)  Bed to Chair: Minimal assistance;Contact guard assistance(to/from therapy mat) (20 1417)  Gait:   Device: Rolling Walker (20)  Other Apparatus: O2 (20)  Assistance: Contact guard assistance (20)  Distance: 50 feet x 2 with seated rest break (20)  Quality of Gait: heavy bracing on ww due to 25% left leg wb restriction, tends to toe walk on left leg, no lob with change in direction []  SW    [x]  RN                    Outpatient Therapy:  []  PT    []  OT    []  ST   []  Rehab Psych                 Equipment:  WW      At D/C their function is goaled at:   PT:Long term goal 1: Pt to be indep with bed mobility  Long term goal 2: Pt to be supervision with transfers maintaining PWB 25% LLE  Long term goal 3: Pt to ambulate 50 ft PWB 25% with [employer]  Long term goal 4: Pt will navigate 4 steps with handrail and approapriate AD to enter/exit home  OT:Eating  Assistance Needed: Independent  CARE Score: 6  Discharge Goal: Independent, Oral Hygiene  Assistance Needed: Setup or clean-up assistance  CARE Score: 5  Discharge Goal: Independent, [address] Needed: Supervision or touching assistance  CARE Score: 4  Discharge Goal: Independent, Shower/Bathe Self  Assistance Needed: Partial/moderate assistance  CARE Score: 3  Discharge Goal: Independent  Upper Body Dressing  Assistance Needed: Setup or clean-up assistance  CARE Score: 5  Discharge Goal: Independent, Lower Body Dressing  Assistance Needed: Partial/moderate assistance  CARE Score: 3  Discharge Goal: Independent, Putting On/Taking Off Footwear  Assistance Needed: Partial/moderate assistance  CARE Score: 3  Discharge Goal: Independent, Toilet Transfer  Assistance Needed: Supervision or touching assistance  CARE Score: 4  Discharge Goal: Independent  SP:               From a cognitive standpoint they will need:        24 hr supervision  --progress to occasional           Significant problems/ barriers to functional progress include: Pt is at a high risk for functional loss,    [x]  Acute infection/UTI    []  Low BP's     []  COPD flare-up   []  Uncontrolled blood sugar     []  Progressive anemia         []  Severe pain exacerbation     []  Impaired mental status    []  Urinary incontinence    []  Bowel incontinence           Plan to correct barriers to functional progress:    Add scheduled rest breaks, control pain by using ice

## 2020-06-02 LAB
ANION GAP SERPL CALCULATED.3IONS-SCNC: 11 MEQ/L (ref 9–15)
BACTERIA: ABNORMAL /HPF
BILIRUBIN URINE: NEGATIVE
BLOOD, URINE: NEGATIVE
BUN BLDV-MCNC: 16 MG/DL (ref 8–23)
CALCIUM SERPL-MCNC: 8.9 MG/DL (ref 8.5–9.9)
CHLORIDE BLD-SCNC: 94 MEQ/L (ref 95–107)
CLARITY: CLEAR
CO2: 30 MEQ/L (ref 20–31)
COLOR: YELLOW
CREAT SERPL-MCNC: 0.52 MG/DL (ref 0.5–0.9)
EPITHELIAL CELLS, UA: ABNORMAL /HPF (ref 0–5)
GFR AFRICAN AMERICAN: >60
GFR NON-AFRICAN AMERICAN: >60
GLUCOSE BLD-MCNC: 90 MG/DL (ref 70–99)
GLUCOSE URINE: NEGATIVE MG/DL
HYALINE CASTS: ABNORMAL /HPF (ref 0–5)
INR BLD: 1.6
KETONES, URINE: NEGATIVE MG/DL
LEUKOCYTE ESTERASE, URINE: ABNORMAL
NITRITE, URINE: NEGATIVE
PH UA: 7 (ref 5–9)
POTASSIUM SERPL-SCNC: 3.8 MEQ/L (ref 3.4–4.9)
PROTEIN UA: NEGATIVE MG/DL
PROTHROMBIN TIME: 19 SEC (ref 12.3–14.9)
RBC UA: ABNORMAL /HPF (ref 0–5)
SODIUM BLD-SCNC: 135 MEQ/L (ref 135–144)
SPECIFIC GRAVITY UA: 1.01 (ref 1–1.03)
URINE REFLEX TO CULTURE: YES
UROBILINOGEN, URINE: 1 E.U./DL
WBC UA: ABNORMAL /HPF (ref 0–5)

## 2020-06-02 PROCEDURE — 6370000000 HC RX 637 (ALT 250 FOR IP): Performed by: NURSE PRACTITIONER

## 2020-06-02 PROCEDURE — 97530 THERAPEUTIC ACTIVITIES: CPT

## 2020-06-02 PROCEDURE — 94640 AIRWAY INHALATION TREATMENT: CPT

## 2020-06-02 PROCEDURE — 36415 COLL VENOUS BLD VENIPUNCTURE: CPT

## 2020-06-02 PROCEDURE — 97535 SELF CARE MNGMENT TRAINING: CPT

## 2020-06-02 PROCEDURE — 85610 PROTHROMBIN TIME: CPT

## 2020-06-02 PROCEDURE — 94761 N-INVAS EAR/PLS OXIMETRY MLT: CPT

## 2020-06-02 PROCEDURE — 6370000000 HC RX 637 (ALT 250 FOR IP): Performed by: PHYSICAL MEDICINE & REHABILITATION

## 2020-06-02 PROCEDURE — 97116 GAIT TRAINING THERAPY: CPT

## 2020-06-02 PROCEDURE — 97110 THERAPEUTIC EXERCISES: CPT

## 2020-06-02 PROCEDURE — 1180000000 HC REHAB R&B

## 2020-06-02 PROCEDURE — 99232 SBSQ HOSP IP/OBS MODERATE 35: CPT | Performed by: PHYSICAL MEDICINE & REHABILITATION

## 2020-06-02 PROCEDURE — 80048 BASIC METABOLIC PNL TOTAL CA: CPT

## 2020-06-02 PROCEDURE — 99232 SBSQ HOSP IP/OBS MODERATE 35: CPT | Performed by: INTERNAL MEDICINE

## 2020-06-02 RX ORDER — NITROFURANTOIN 25; 75 MG/1; MG/1
100 CAPSULE ORAL
Status: DISCONTINUED | OUTPATIENT
Start: 2020-06-02 | End: 2020-06-03

## 2020-06-02 RX ADMIN — TIOTROPIUM BROMIDE INHALATION SPRAY 2 PUFF: 3.12 SPRAY, METERED RESPIRATORY (INHALATION) at 05:17

## 2020-06-02 RX ADMIN — MONTELUKAST 10 MG: 10 TABLET, FILM COATED ORAL at 21:01

## 2020-06-02 RX ADMIN — Medication 400 MG: at 08:38

## 2020-06-02 RX ADMIN — Medication 100 MG: at 08:38

## 2020-06-02 RX ADMIN — ACETAMINOPHEN 650 MG: 325 TABLET ORAL at 16:07

## 2020-06-02 RX ADMIN — ACETAMINOPHEN 650 MG: 325 TABLET ORAL at 03:11

## 2020-06-02 RX ADMIN — HYDROXYCHLOROQUINE SULFATE 100 MG: 200 TABLET, FILM COATED ORAL at 08:38

## 2020-06-02 RX ADMIN — BUDESONIDE AND FORMOTEROL FUMARATE DIHYDRATE 2 PUFF: 80; 4.5 AEROSOL RESPIRATORY (INHALATION) at 16:15

## 2020-06-02 RX ADMIN — PANTOPRAZOLE SODIUM 20 MG: 20 TABLET, DELAYED RELEASE ORAL at 05:57

## 2020-06-02 RX ADMIN — ACETAMINOPHEN 650 MG: 325 TABLET ORAL at 21:00

## 2020-06-02 RX ADMIN — Medication 100 MG: at 11:30

## 2020-06-02 RX ADMIN — HYDROXYCHLOROQUINE SULFATE 100 MG: 200 TABLET, FILM COATED ORAL at 21:01

## 2020-06-02 RX ADMIN — BUDESONIDE AND FORMOTEROL FUMARATE DIHYDRATE 2 PUFF: 80; 4.5 AEROSOL RESPIRATORY (INHALATION) at 05:17

## 2020-06-02 RX ADMIN — LISINOPRIL 20 MG: 20 TABLET ORAL at 21:01

## 2020-06-02 RX ADMIN — ACETAMINOPHEN 650 MG: 325 TABLET ORAL at 08:38

## 2020-06-02 RX ADMIN — NITROFURANTOIN (MONOHYDRATE/MACROCRYSTALS) 100 MG: 75; 25 CAPSULE ORAL at 16:07

## 2020-06-02 RX ADMIN — SOTALOL HYDROCHLORIDE 120 MG: 120 TABLET ORAL at 08:38

## 2020-06-02 RX ADMIN — SOTALOL HYDROCHLORIDE 120 MG: 120 TABLET ORAL at 21:01

## 2020-06-02 RX ADMIN — VITAMIN D, TAB 1000IU (100/BT) 2000 UNITS: 25 TAB at 16:07

## 2020-06-02 RX ADMIN — WARFARIN SODIUM 5 MG: 5 TABLET ORAL at 16:07

## 2020-06-02 ASSESSMENT — PAIN DESCRIPTION - PAIN TYPE
TYPE: SURGICAL PAIN

## 2020-06-02 ASSESSMENT — PAIN SCALES - GENERAL
PAINLEVEL_OUTOF10: 0
PAINLEVEL_OUTOF10: 0
PAINLEVEL_OUTOF10: 4
PAINLEVEL_OUTOF10: 4
PAINLEVEL_OUTOF10: 0
PAINLEVEL_OUTOF10: 4
PAINLEVEL_OUTOF10: 6
PAINLEVEL_OUTOF10: 4
PAINLEVEL_OUTOF10: 6

## 2020-06-02 ASSESSMENT — PAIN DESCRIPTION - DESCRIPTORS
DESCRIPTORS: ACHING

## 2020-06-02 ASSESSMENT — PAIN DESCRIPTION - LOCATION
LOCATION: HIP

## 2020-06-02 ASSESSMENT — PAIN DESCRIPTION - FREQUENCY
FREQUENCY: INTERMITTENT
FREQUENCY: CONTINUOUS

## 2020-06-02 ASSESSMENT — PAIN DESCRIPTION - ORIENTATION
ORIENTATION: LEFT

## 2020-06-02 ASSESSMENT — PAIN DESCRIPTION - ONSET
ONSET: ON-GOING
ONSET: GRADUAL

## 2020-06-02 ASSESSMENT — PAIN DESCRIPTION - PROGRESSION
CLINICAL_PROGRESSION: NOT CHANGED
CLINICAL_PROGRESSION: GRADUALLY IMPROVING

## 2020-06-02 NOTE — PROGRESS NOTES
Subjective: The patient complains of severe  acute left hip fracture partially relieved by rest, ice, opiate medication list so relieved by plain Tylenol and exacerbated by weightbearing. I am concerned about patients bleeding risk on Coumadin. I am also concerned about her slightly low sodium, her elevated potassium and her elevated blood sugars-her GFr is above 60 INR is 1.6 and trending toward therapeutic electrolytes look good for today. UA shows large leukocyte Estrace few bacteria. ROS x10: The patient also complains of severely impaired mobility and activities of daily living. Otherwise no new problems with vision, hearing, nose, mouth, throat, dermal, cardiovascular, GI, , pulmonary, musculoskeletal, psychiatric or neurological. See Rehab H&P on Rehab chart dated . Vital signs:  BP (!) 169/66   Pulse 74   Temp 97 °F (36.1 °C) (Oral)   Resp 16   Ht 5' 5\" (1.651 m)   Wt 161 lb 1.6 oz (73.1 kg)   LMP  (LMP Unknown)   SpO2 95%   BMI 26.81 kg/m²   I/O:   PO/Intake:  fair PO intake, no problems observed or reported. Bowel/Bladder:  continent, frequent nocturnal urination-UTI  General:  Patient is well developed, adequately nourished, non-obese and     well kempt. HEENT:    PERRLA, hearing intact to loud voice, external inspection of ear     and nose benign. Inspection of lips, tongue and gums benign  Musculoskeletal: No significant change in strength or tone. All joints stable. Inspection and palpation of digits and nails show no clubbing,       cyanosis or inflammatory conditions. Neuro/Psychiatric: Affect: flat but pleasant. Alert and oriented to person, place and     situation. No significant change in deep tendon reflexes or     sensation  Lungs:  Diminished, CTA-B. Respiration effort is normal at rest.     Heart:   S1 = S2, irregular. No loud murmurs. Abdomen:  Soft, non-tender, no enlargement of liver or spleen.   Extremities:  No significant lower 5/26/2020  No acute radiographic abnormality or significant interval change. Emphysema    Xr Femur Left  : 5/26/2020    Acute complex left proximal femur intertrochanteric fracture, with fracture line extending to involve both the greater and lesser trochanters, with multiple small fracture fragments at the fracture sites and displacement measuring up to 15 mm. Alignment of left hip appears maintained. Degenerative changes lower lumbar spine and SI joints. Right hip unremarkable. No other fracture in the distal femur on the dedicated left femur radiographs. Alignment is grossly maintained. No other significant abnormality. Left femur complex intertrochanteric fracture. Ct Head  : 5/25/2020    An unenhanced CT scan of the brain demonstrates no evidence of a skull fracture. There is cerebral atrophy and age related findings in the brain. There is no acute CVA. There is no acute intra-axial or extra-axial findings in the brain. There is no hydrocephalus, intracranial mass, hemorrhage, \"mass effect\" or midline shift. The remainder of the CT scan of the brain appears unremarkable. 1. CEREBRAL ATROPHY AND AGE RELATED FINDINGS IN THE BRAIN. 2. NO ACUTE INTRA-AXIAL OR EXTRA-AXIAL FINDINGS IN THE BRAIN. Ct Cervical Spine : 5/25/2020   There is no prevertebral soft tissue swelling. 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 There is cervical spondylosis with degenerative bone spurring and posterior facet arthritis at multiple cervical vertebral levels. There is uncovertebral joint arthropathy throughout the cervical spine. 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 There is no fracture, subluxation or dislocation involving the cervical spine. There are no osteolytic or osteoblastic lesion in the cervical spine. No acute traumatic bone injury involving the cervical spine.      1.  DEGENERATIVE AND ARTHRITIC CHANGES THROUGHOUT THE C-SPINE AS DESCRIBED. 2.  NO ACUTE FRACTURE, SUBLUXATION OR DISLOCATION INVOLVING THE CERVICAL SPINE. Xr Chest   5/28/2020   FINDINGS: Single  views of the chest is submitted. Left-sided CCD device. Leads overlying the cardiac silhouette. Unchanged The cardiac silhouette is of normal size configuration. . Pulmonary vascular unremarkable. Right sided trachea. No focal infiltrates. No Pneumothoraces. Old healed right humeral head fracture                                                                                   NO ACUTE ACTIVE CARDIOPULMONARY PROCESS    Fluoro For Surgical  5/27/2020  EXAMINATION:  FLUORO FOR SURGICAL PROCEDURES CLINICAL HISTORY:  ORIF Left Femur COMPARISONS:  Left femur radiographs 5/26/2020 TECHNIQUE: Intraoperative fluoroscopy. Fluoroscopic time 34.9 seconds. 9 screen capture images. FINDINGS:  Submitted screen capture images document left femoral TFN fixation in near-anatomic alignment. See operative report for correlation. FLUOROSCOPIC INTRAOPERATIVE IMAGING ASSISTANCE FOR LEFT HIP TFN ORIF     Xr Hip 2-3 Vw W Pelvis Left : 5/26/2020    Acute complex left proximal femur intertrochanteric fracture, with fracture line extending to involve both the greater and lesser trochanters, with multiple small fracture fragments at the fracture sites and displacement measuring up to 15 mm. Alignment of left hip appears maintained. Degenerative changes lower lumbar spine and SI joints. Right hip unremarkable. No other fracture in the distal femur on the dedicated left femur radiographs. Alignment is grossly maintained. No other significant abnormality. Left femur complex intertrochanteric fracture. Previous extensive, complex labs, notes and diagnostics reviewed and analyzed.      ALLERGIES:    Allergies as of 05/29/2020 - Review Complete 05/29/2020   Allergen Reaction Noted    Lipitor [atorvastatin calcium]  05/25/2020      (please also verify

## 2020-06-02 NOTE — PROGRESS NOTES
unable to eat.  Requesting smaller portions  · Wound Type: Surgical Wound  · Current Nutrition Therapies:  · Oral Diet Orders: Carb Control 4 Carbs/Meal   · Oral Diet intake: 26-50%, 51-75%  · Oral Nutrition Supplement (ONS) Orders: Standard High Calorie Oral Supplement(x1)  · ONS intake: Unable to assess  · Anthropometric Measures:  · Ht: 5' 5\" (165.1 cm)   · Current Body Wt: 161 lb (73 kg)((6/1) & ( 6/2))  · Admission Body Wt: 160 lb (72.6 kg)(stated, initial admission on 5/25/20)  · Usual Body Wt: 157 lb (71.2 kg)(12/2/19-office visit, 159 lb (8/29/19) office visit)  · % Weight Change:  ,  no significant change  · Ideal Body Wt: 125 lb (56.7 kg), % Ideal Body > 100%  · BMI Classification: BMI 25.0 - 29.9 Overweight(26.6 if admission wt is accurate)    Nutrition Interventions:   Continue with Carb Control 4 diet, small portions per pt request, Change supplement to diabetic ONS @ D,   Continued Inpatient Monitoring, Education Not Indicated, ffollow up for needs prior to d/c    Nutrition Evaluation:   · Evaluation: Goals set   · Goals: po intake > 75% of meals and supplements    · Monitoring: Meal Intake, Supplement Intake, Weight, Pertinent Labs      Electronically signed by Luis Chiu RD, LD on 6/2/20 at 12:11 PM EDT

## 2020-06-02 NOTE — PROGRESS NOTES
Physical Therapy Rehab Treatment Note  [facility]  [facility]       NAME: Ubaldo Hernández  : 1935 (81 y.o.)  MRN: [id number]  CODE STATUS: Full Code    Date of Service: 2020  Chart Reviewed: Yes  Family / Caregiver Present: No    Restrictions:          SUBJECTIVE: Subjective: \"my leg is sore, hurts to move\"  Pain Screening  Patient Currently in Pain: Yes       Post Treatment Pain Screenin/10  Pain Assessment  Pain Level: 6    OBJECTIVE:                         Transfers  Sit to Stand: Stand by assistance;Supervision  Stand to sit: Stand by assistance;Supervision    Ambulation  Ambulation?: Yes  Ambulation 1  Surface: carpet  Device: Rolling Walker  Assistance: Supervision  Quality of Gait: Demonstrates good ability to maintain 25% PWB on left. Gait Deviations: Slow Marivel;Decreased step length  Distance: 30' x 2  Comments: Pt reported being a little short of breath post ambulation. PSO2 at 91%              Activity Tolerance  Activity Tolerance: Patient Tolerated treatment well    Exercises  Quad Sets: 2 x 10  Heelslides: seated slide board 2x10  Gluteal Sets: x20  Hip Abduction: supine slide board x 20 (w AAROM)  Knee Long Arc Quad: x20 (Lft- 10 with AAROM, 10 AROM)  Knee Short Arc Quad: x 20 alternating legs x 2  Ankle Pumps: x20  Other exercises  Other exercises 1: Hip adduction with ball x 20     ASSESSMENT/COMMENTS:  Body structures, Functions, Activity limitations: Decreased functional mobility ; Decreased balance;Decreased endurance;Decreased strength; Increased pain  Assessment: Pt showed good ability to maitnain pwb restrictions while ambulating. Showed fatique near end of session and c/o SOB post ambulation. Pt needed VC for proper exercise technique during mat exercises. PLAN OF CARE/Safety:   Safety Devices  Type of devices: Call light within reach; Left in chair      Therapy Time:   Individual   Time In 1500   Time Out 1600   Minutes 60     Minutes:60

## 2020-06-02 NOTE — PROGRESS NOTES
complex intertrochanteric fracture. IMPRESSION AND SUGGESTION:  1. COPD  2. Obstructive sleep apnea  3. Chronic hypoxic respiratory failure on O2 at home  4. Left femoral fracture    Continue O2 to keep saturation 90% or above. Continue CPAP therapy at nighttime. ContinueSpiriva 2 puff in a.m., Symbicort 2 puff twice daily albuterol HFA 2 puff every 4 hours as needed, singular 10 mg daily.   Will follow      Electronically signed by Elizabeth Proctor MD, [facility] on 6/2/2020 at 3:28 PM

## 2020-06-02 NOTE — PROGRESS NOTES
Occupational Therapy  Facility/Department: [facility]  Daily Treatment Note  NAME: Yecenia Bauman  : 1935  MRN: [id number]    Date of Service: 2020    Discharge Recommendations:  Continue to assess pending progress    Assessment  Activity Tolerance  Activity Tolerance: Patient Tolerated treatment well  Safety Devices  Safety Devices in place: Yes  Type of devices: All fall risk precautions in place       Patient Diagnosis(es): There were no encounter diagnoses. has a past medical history of COPD (chronic obstructive pulmonary disease) ([facility] [address] Hypertension, and Lupus ([facility] [address] has a past surgical history that includes Cholecystectomy; Appendectomy; Elbow surgery (Left); Cardiac pacemaker placement; and Femur fracture surgery (Left, 2020).     Restrictions  Restrictions/Precautions  Restrictions/Precautions: Weight Bearing, Fall Risk  Implants present? : Pacemaker  Lower Extremity Weight Bearing Restrictions  Left Lower Extremity Weight Bearing: Partial Weight Bearing  Partial Weight Bearing Percentage Or Pounds: 25%     Subjective  General  Chart Reviewed: Yes  Patient assessed for rehabilitation services?: Yes  Response to previous treatment: Patient with no complaints from previous session  Family / Caregiver Present: No  Referring Practitioner: Dr Akiko Gonsalves  Diagnosis: Imp Mob 2° intertrochanteric fracture S/P IM nailing  Pain Assessment  Pain Assessment: 0-10  Pain Level: 4  Pain Type: Surgical pain  Pain Location: Hip  Pain Orientation: Left  Pain Descriptors: Aching  Pain Frequency: Continuous  Pain Onset: On-going  Clinical Progression: Not changed  Non-Pharmaceutical Pain Intervention(s): Declines  Pre Treatment Pain Screening  Pain at present: 4  Scale Used: Numeric Score  Intervention List: Patient able to continue with treatment  Comments / Details: Cold applied to left upper leg x 20 minutes   Vital Signs  Patient Currently in Pain: Yes     Objective  Upper Extremity Function  UE

## 2020-06-02 NOTE — PROGRESS NOTES
Occupational Therapy  Facility/Department: [facility]  Daily Treatment Note  NAME: Roverto Salas  : 1935  MRN: [id number]    Date of Service: 2020    Discharge Recommendations:  Continue to assess pending progress       Assessment      Activity Tolerance  Activity Tolerance: Patient Tolerated treatment well  Safety Devices  Safety Devices in place: Yes  Type of devices: All fall risk precautions in place         Patient Diagnosis(es): There were no encounter diagnoses. has a past medical history of COPD (chronic obstructive pulmonary disease) ([facility] [address] Hypertension, and Lupus ([facility] [address] has a past surgical history that includes Cholecystectomy; Appendectomy; Elbow surgery (Left); Cardiac pacemaker placement; and Femur fracture surgery (Left, 2020). Restrictions  Restrictions/Precautions  Restrictions/Precautions: Weight Bearing, Fall Risk  Implants present? : Pacemaker  Lower Extremity Weight Bearing Restrictions  Left Lower Extremity Weight Bearing: Partial Weight Bearing  Partial Weight Bearing Percentage Or Pounds: 25%     Subjective   General  Chart Reviewed: Yes  Patient assessed for rehabilitation services?: Yes  Response to previous treatment: Patient with no complaints from previous session  Family / Caregiver Present: No  Referring Practitioner: Dr Chuck Song  Diagnosis: Imp Mob 2° intertrochanteric fracture S/P IM nailing    Subjective  Subjective: I fell and got my feet all tangled up. Pain Assessment  Pain Level: 0  Pre Treatment Pain Screening  Pain at present: 0     Orientation  Orientation  Overall Orientation Status: Within Functional Limits     Objective      Instrumental ADL's  Instrumental ADLs: Yes  Health Management  Health Management Level of Assistance:  Moderate assistance  Health Management: Patient completed medication management simulation utilizing 7 provided medication bottles with written instruction to place a total of 74 beads into the provided weekly medication

## 2020-06-03 LAB
INR BLD: 1.7
ORGANISM: ABNORMAL
PROTHROMBIN TIME: 19.8 SEC (ref 12.3–14.9)
URINE CULTURE, ROUTINE: ABNORMAL

## 2020-06-03 PROCEDURE — 6370000000 HC RX 637 (ALT 250 FOR IP): Performed by: NURSE PRACTITIONER

## 2020-06-03 PROCEDURE — 1180000000 HC REHAB R&B

## 2020-06-03 PROCEDURE — 99232 SBSQ HOSP IP/OBS MODERATE 35: CPT | Performed by: PHYSICAL MEDICINE & REHABILITATION

## 2020-06-03 PROCEDURE — 6370000000 HC RX 637 (ALT 250 FOR IP): Performed by: PHYSICAL MEDICINE & REHABILITATION

## 2020-06-03 PROCEDURE — 94761 N-INVAS EAR/PLS OXIMETRY MLT: CPT

## 2020-06-03 PROCEDURE — 2700000000 HC OXYGEN THERAPY PER DAY

## 2020-06-03 PROCEDURE — 94640 AIRWAY INHALATION TREATMENT: CPT

## 2020-06-03 PROCEDURE — 97535 SELF CARE MNGMENT TRAINING: CPT

## 2020-06-03 PROCEDURE — 97530 THERAPEUTIC ACTIVITIES: CPT

## 2020-06-03 PROCEDURE — 94760 N-INVAS EAR/PLS OXIMETRY 1: CPT

## 2020-06-03 PROCEDURE — 85610 PROTHROMBIN TIME: CPT

## 2020-06-03 PROCEDURE — 97116 GAIT TRAINING THERAPY: CPT

## 2020-06-03 PROCEDURE — 36415 COLL VENOUS BLD VENIPUNCTURE: CPT

## 2020-06-03 PROCEDURE — 97110 THERAPEUTIC EXERCISES: CPT

## 2020-06-03 RX ORDER — LISINOPRIL 20 MG/1
40 TABLET ORAL NIGHTLY
Status: DISCONTINUED | OUTPATIENT
Start: 2020-06-03 | End: 2020-06-10 | Stop reason: HOSPADM

## 2020-06-03 RX ORDER — CIPROFLOXACIN 500 MG/1
500 TABLET, FILM COATED ORAL EVERY 12 HOURS SCHEDULED
Status: COMPLETED | OUTPATIENT
Start: 2020-06-03 | End: 2020-06-09

## 2020-06-03 RX ADMIN — HYDROXYCHLOROQUINE SULFATE 100 MG: 200 TABLET, FILM COATED ORAL at 20:49

## 2020-06-03 RX ADMIN — BUDESONIDE AND FORMOTEROL FUMARATE DIHYDRATE 2 PUFF: 80; 4.5 AEROSOL RESPIRATORY (INHALATION) at 16:09

## 2020-06-03 RX ADMIN — SOTALOL HYDROCHLORIDE 120 MG: 120 TABLET ORAL at 09:38

## 2020-06-03 RX ADMIN — HYDROXYCHLOROQUINE SULFATE 100 MG: 200 TABLET, FILM COATED ORAL at 09:38

## 2020-06-03 RX ADMIN — CIPROFLOXACIN 500 MG: 500 TABLET, FILM COATED ORAL at 20:52

## 2020-06-03 RX ADMIN — Medication 400 MG: at 09:38

## 2020-06-03 RX ADMIN — CIPROFLOXACIN 500 MG: 500 TABLET, FILM COATED ORAL at 13:49

## 2020-06-03 RX ADMIN — NITROFURANTOIN (MONOHYDRATE/MACROCRYSTALS) 100 MG: 75; 25 CAPSULE ORAL at 06:25

## 2020-06-03 RX ADMIN — ACETAMINOPHEN 650 MG: 325 TABLET ORAL at 16:06

## 2020-06-03 RX ADMIN — Medication 100 MG: at 13:49

## 2020-06-03 RX ADMIN — VITAMIN D, TAB 1000IU (100/BT) 2000 UNITS: 25 TAB at 18:13

## 2020-06-03 RX ADMIN — TIOTROPIUM BROMIDE INHALATION SPRAY 2 PUFF: 3.12 SPRAY, METERED RESPIRATORY (INHALATION) at 05:04

## 2020-06-03 RX ADMIN — BUDESONIDE AND FORMOTEROL FUMARATE DIHYDRATE 2 PUFF: 80; 4.5 AEROSOL RESPIRATORY (INHALATION) at 05:03

## 2020-06-03 RX ADMIN — PANTOPRAZOLE SODIUM 20 MG: 20 TABLET, DELAYED RELEASE ORAL at 06:25

## 2020-06-03 RX ADMIN — MONTELUKAST 10 MG: 10 TABLET, FILM COATED ORAL at 20:50

## 2020-06-03 RX ADMIN — Medication 100 MG: at 09:38

## 2020-06-03 RX ADMIN — WARFARIN SODIUM 5 MG: 5 TABLET ORAL at 18:13

## 2020-06-03 RX ADMIN — SOTALOL HYDROCHLORIDE 120 MG: 120 TABLET ORAL at 20:49

## 2020-06-03 RX ADMIN — LISINOPRIL 40 MG: 20 TABLET ORAL at 20:50

## 2020-06-03 RX ADMIN — ACETAMINOPHEN 650 MG: 325 TABLET ORAL at 09:40

## 2020-06-03 RX ADMIN — ACETAMINOPHEN 650 MG: 325 TABLET ORAL at 20:49

## 2020-06-03 ASSESSMENT — PAIN SCALES - GENERAL
PAINLEVEL_OUTOF10: 0
PAINLEVEL_OUTOF10: 0
PAINLEVEL_OUTOF10: 4
PAINLEVEL_OUTOF10: 0
PAINLEVEL_OUTOF10: 3

## 2020-06-03 NOTE — PROGRESS NOTES
Physical Therapy Rehab Treatment Note  Facility/Department: [facility]  Room: R255/R255-01       NAME: Josefina Lyons  : 1935 (81 y.o.)  MRN: [id number]  CODE STATUS: Full Code    Date of Service: 6/3/2020  Chart Reviewed: Yes  Family / Caregiver Present: No  General Comment  Comments: Feeling a little sore from AM session. Reported pain level at a 4/10 post session. Restrictions:          SUBJECTIVE: Subjective: \"leg feels a little heavy\"  Pain Screening  Patient Currently in Pain: No       Post Treatment Pain Screenin/10       OBJECTIVE:                    Bed mobility  Supine to Sit: Moderate assistance;Maximum assistance  Sit to Supine: Moderate assistance;Maximum assistance  Comment: Instructed pt on log roll technique for sit to supine, supine to sit. Pt requires mod/max A to get left leg on and off bed. Transfers  Sit to Stand: Stand by assistance;Supervision  Stand to sit: Stand by assistance;Supervision  Bed to Chair: Minimal assistance;Contact guard assistance  Stand Pivot Transfers: Minimal Assistance; Moderate Assistance  Comment: Stand pivot from chair to table performed without wheeled walker, cued to maintain WB restriction    Ambulation  Ambulation?: Yes  Ambulation 1  Surface: carpet  Device: Rolling Walker  Other Apparatus: O2  Assistance: Contact guard assistance  Quality of Gait: Demonstrated ability to maintain 25% PWB on left leg. Pt reported leg fatigue at end of gait. Gait Deviations: Slow Marivel;Decreased step length  Distance: 61' with turn around, 36'   Comments: Pt used 1L O2 for both ambulations, SpO2 at 96% post first ambulation and 93% post 2nd ambulation. Pt needed addition rest between ambulations. Activity Tolerance  Activity Tolerance: Patient Tolerated treatment well  Activity Tolerance: Pt showed signs of fatigue at the end of session today.      Exercises  Hamstring Sets: seated HS curls x15 B YTB, seated HS stretching Lt 3x30' w/ stool

## 2020-06-03 NOTE — PROGRESS NOTES
Occupational Therapy  Facility/Department: [facility]  Daily Treatment Note  NAME: Radha Tate  : 1935  MRN: [id number]    Date of Service: 6/3/2020    Discharge Recommendations:  Continue to assess pending progress       Assessment      Activity Tolerance  Activity Tolerance: Patient Tolerated treatment well  Activity Tolerance: Per Tamara RN, patient to be monitored and weaned from O2. Patient at 95-99% initially. Therapist checked patient after each individual task of activity. Patient dropped to 89%. Therapist turned O2 from 0L up to 1L. Patient maintained 95% O2 readings for the remainder of session. Tamara RN notified. Safety Devices  Safety Devices in place: Yes  Type of devices: All fall risk precautions in place         Patient Diagnosis(es): There were no encounter diagnoses. has a past medical history of COPD (chronic obstructive pulmonary disease) ([facility] [address] Hypertension, and Lupus ([facility] [address] has a past surgical history that includes Cholecystectomy; Appendectomy; Elbow surgery (Left); Cardiac pacemaker placement; and Femur fracture surgery (Left, 2020). Restrictions  Restrictions/Precautions  Restrictions/Precautions: Weight Bearing, Fall Risk  Implants present? : Pacemaker  Lower Extremity Weight Bearing Restrictions  Left Lower Extremity Weight Bearing: Partial Weight Bearing  Partial Weight Bearing Percentage Or Pounds: 25%     Subjective   General  Chart Reviewed: Yes  Patient assessed for rehabilitation services?: Yes  Response to previous treatment: Patient with no complaints from previous session  Family / Caregiver Present: No  Referring Practitioner: Dr Yuni John  Diagnosis: Imp Mob 2° intertrochanteric fracture S/P IM nailing    Subjective  Subjective: Oh. I dont go until 2:00.     Pain Assessment  Pain Level: 0  Pre Treatment Pain Screening  Pain at present: 0     Orientation  Orientation  Overall Orientation Status: Within Functional Limits     Objective      ADL    LE Dressing: Other (Patient educated in [address] for lower body dressing. Patient able to doff R shoes with dressing stick with MOD difficulty requiring MOD verbal cues. Patient able to doff R socks with dressing stick with MOD difficulty requiring demonstration and MIN verbal cues. Patient educated on LE Dressing clothes with reacher with demonstration. Patient able to  R sock from floor with reacher with 0 difficulty. Patient able to thread R sock onto sock aid with MIN difficulty requiring demonstration and 0 verbal cues. Patient able to don R sock with sock aid with MIN difficulty requiring demonstration and  MIn verbal cues. Patient able to remove shoelaces from B shoes with MIN difficulty. Patient able to thread elastic shoelaces onto B shoes with MIN difficulty. Patient able to don B shoes with long handled shoe horn with MAX difficulty with L shoe requiring demonstration, MIN verbal cues and MAX A for L shoe.)    Upper Extremity Function  UE Strengthing: Patient engaged in B UE ROM/strengthening, B FM coordination and cognition to increase I with ADL's, IADL's and transfers. Patient donned B 1 # wrist weights. Patient able to reach in lateral planes with 0 difficulty. Patient able to reach in forward planes with MIN difficulty. Patient able to  100 large pegs with 0 difficulty 1 at a time. Patient able to place large pegs 1 at a time in pegboard with MIN difficulty. Patient alternated UE's after every 10th peg. Patient able to  50 marbles with R FM 0 difficulty 1 at a time. Patient able to  50 pennies with L FM 0 difficulty 1 at a time. Patient able to follow AB pattern with MIN verbal cues while placing marbles and pennies on large pegs. Patient with 0 difficulty placing marbles on large pegs with R FM. Patient with MIN difficulty placing pennies on large pegs with L FM. Patient removed marbles 5 at a time with R FM with 0 difficulty.   Patient removed pennies 5 at a

## 2020-06-03 NOTE — PROGRESS NOTES
Subjective: The patient complains of severe  acute left hip fracture partially relieved by rest, ice, opiate medication list so relieved by plain Tylenol and exacerbated by weightbearing. I am concerned about patients bleeding risk on Coumadin. I reviewed patient care with previous and current nurses, they confirmed and reported \"patient has slept at long intervals. Voiding very frequently so patine using pure wick for continence this evening. Wearing CPAP from home. \"    ROS x10: The patient also complains of severely impaired mobility and activities of daily living. Otherwise no new problems with vision, hearing, nose, mouth, throat, dermal, cardiovascular, GI, , pulmonary, musculoskeletal, psychiatric or neurological. See Rehab H&P on Rehab chart dated . Vital signs:  BP (!) 166/72   Pulse 65   Temp 98 °F (36.7 °C) (Oral)   Resp 16   Ht 5' 5\" (1.651 m)   Wt 161 lb 1.6 oz (73.1 kg)   LMP  (LMP Unknown)   SpO2 93%   BMI 26.81 kg/m²   I/O:   PO/Intake:  fair PO intake, no problems observed or reported. Bowel/Bladder:  continent, frequent nocturnal urination-UTI pure wick catheter at night  General:  Patient is well developed, adequately nourished, non-obese and     well kempt. HEENT:    PERRLA, hearing intact to loud voice, external inspection of ear     and nose benign. Inspection of lips, tongue and gums benign  Musculoskeletal: No significant change in strength or tone. All joints stable. Inspection and palpation of digits and nails show no clubbing,       cyanosis or inflammatory conditions. Neuro/Psychiatric: Affect: flat but pleasant. Alert and oriented to person, place and     situation. No significant change in deep tendon reflexes or     sensation  Lungs:  Diminished, CTA-B. Respiration effort is normal at rest.     Heart:   S1 = S2, irregular. No loud murmurs. Abdomen:  Soft, non-tender, no enlargement of liver or spleen.   Extremities:  No significant ALLERGIES:    Allergies as of 05/29/2020 - Review Complete 05/29/2020   Allergen Reaction Noted    Lipitor [atorvastatin calcium]  05/25/2020      (please also verify by checking MAR)      I reviewed her [facility] prescription monitoring service data sheets in hopes of eliminating polypharmacy and weaning to the lowest effective dose of pain medications and eliminating the concomitant use of benzodiazepines. I see no medications of concern. I see no habits of combining sedatives and narcotics. Complex Physical Medicine & Rehab Issues Assess & Plan:   1. Severe abnormality of gait and mobility and impaired self-care and ADL's secondary to acute left intratrochanteric fracture progressive osteoporosis and falling. Functional and medical status reassessed regarding patients ability to participate in therapies and patient found to be able to participate in acute intensive comprehensive inpatient rehabilitation program including PT/OT to improve balance, ambulation, ADLs, and to improve the P/AROM. Therapeutic modifications regarding activities in therapies, place, amount of time per day and intensity of therapy made daily. In bed therapies or bedside therapies prn.   2. Bowel and Bladder dysfunction:  frequent toileting, ambulate to bathroom with assistance, check post void residuals. Check for C.difficile x1 if >2 loose stools in 24 hours, continue bowel & bladder program.  Monitor bowel and bladder function. Lactinex 2 PO every AC. MOM prn, Brown Bomb prn, Glycerin suppository prn, enema prn.  3. Severe left hip fracture related pain as well as generalized OA pain: reassess pain every shift and prior to and after each therapy session, give prn Tylenol and titrating doses of Norco always using lowest effective dose, modalities prn in therapy, Lidoderm, K-pad prn. Patient is on daily Deltasone for her lungs causing osteoporosis  4.  Skin healing and breakdown risk:  continue pressure relief program.

## 2020-06-04 LAB
INR BLD: 1.9
ORGANISM: ABNORMAL
ORGANISM: ABNORMAL
PROTHROMBIN TIME: 21.5 SEC (ref 12.3–14.9)
URINE CULTURE, ROUTINE: ABNORMAL
URINE CULTURE, ROUTINE: ABNORMAL

## 2020-06-04 PROCEDURE — 94640 AIRWAY INHALATION TREATMENT: CPT

## 2020-06-04 PROCEDURE — 6370000000 HC RX 637 (ALT 250 FOR IP): Performed by: PHYSICAL MEDICINE & REHABILITATION

## 2020-06-04 PROCEDURE — 1180000000 HC REHAB R&B

## 2020-06-04 PROCEDURE — 6370000000 HC RX 637 (ALT 250 FOR IP): Performed by: NURSE PRACTITIONER

## 2020-06-04 PROCEDURE — 97530 THERAPEUTIC ACTIVITIES: CPT

## 2020-06-04 PROCEDURE — 97116 GAIT TRAINING THERAPY: CPT

## 2020-06-04 PROCEDURE — 99232 SBSQ HOSP IP/OBS MODERATE 35: CPT | Performed by: INTERNAL MEDICINE

## 2020-06-04 PROCEDURE — 36415 COLL VENOUS BLD VENIPUNCTURE: CPT

## 2020-06-04 PROCEDURE — 97535 SELF CARE MNGMENT TRAINING: CPT

## 2020-06-04 PROCEDURE — 2700000000 HC OXYGEN THERAPY PER DAY

## 2020-06-04 PROCEDURE — 85610 PROTHROMBIN TIME: CPT

## 2020-06-04 PROCEDURE — 97110 THERAPEUTIC EXERCISES: CPT

## 2020-06-04 PROCEDURE — 94761 N-INVAS EAR/PLS OXIMETRY MLT: CPT

## 2020-06-04 RX ORDER — LANOLIN ALCOHOL/MO/W.PET/CERES
400 CREAM (GRAM) TOPICAL
Status: DISCONTINUED | OUTPATIENT
Start: 2020-06-05 | End: 2020-06-10 | Stop reason: HOSPADM

## 2020-06-04 RX ADMIN — SOTALOL HYDROCHLORIDE 120 MG: 120 TABLET ORAL at 08:32

## 2020-06-04 RX ADMIN — MONTELUKAST 10 MG: 10 TABLET, FILM COATED ORAL at 22:26

## 2020-06-04 RX ADMIN — HYDROXYCHLOROQUINE SULFATE 100 MG: 200 TABLET, FILM COATED ORAL at 22:25

## 2020-06-04 RX ADMIN — CIPROFLOXACIN 500 MG: 500 TABLET, FILM COATED ORAL at 08:31

## 2020-06-04 RX ADMIN — Medication 100 MG: at 08:32

## 2020-06-04 RX ADMIN — VITAMIN D, TAB 1000IU (100/BT) 2000 UNITS: 25 TAB at 16:56

## 2020-06-04 RX ADMIN — WARFARIN SODIUM 5 MG: 5 TABLET ORAL at 16:55

## 2020-06-04 RX ADMIN — ACETAMINOPHEN 650 MG: 325 TABLET ORAL at 23:33

## 2020-06-04 RX ADMIN — CIPROFLOXACIN 500 MG: 500 TABLET, FILM COATED ORAL at 22:25

## 2020-06-04 RX ADMIN — Medication 400 MG: at 12:26

## 2020-06-04 RX ADMIN — BUDESONIDE AND FORMOTEROL FUMARATE DIHYDRATE 2 PUFF: 80; 4.5 AEROSOL RESPIRATORY (INHALATION) at 04:26

## 2020-06-04 RX ADMIN — HYDROXYCHLOROQUINE SULFATE 100 MG: 200 TABLET, FILM COATED ORAL at 08:31

## 2020-06-04 RX ADMIN — SOTALOL HYDROCHLORIDE 120 MG: 120 TABLET ORAL at 22:26

## 2020-06-04 RX ADMIN — ACETAMINOPHEN 650 MG: 325 TABLET ORAL at 12:24

## 2020-06-04 RX ADMIN — LISINOPRIL 40 MG: 20 TABLET ORAL at 22:25

## 2020-06-04 RX ADMIN — TIOTROPIUM BROMIDE INHALATION SPRAY 2 PUFF: 3.12 SPRAY, METERED RESPIRATORY (INHALATION) at 04:26

## 2020-06-04 RX ADMIN — PANTOPRAZOLE SODIUM 20 MG: 20 TABLET, DELAYED RELEASE ORAL at 06:06

## 2020-06-04 RX ADMIN — METHOCARBAMOL TABLETS 500 MG: 500 TABLET, COATED ORAL at 08:35

## 2020-06-04 RX ADMIN — ACETAMINOPHEN 650 MG: 325 TABLET ORAL at 18:46

## 2020-06-04 RX ADMIN — Medication 100 MG: at 12:24

## 2020-06-04 RX ADMIN — BUDESONIDE AND FORMOTEROL FUMARATE DIHYDRATE 2 PUFF: 80; 4.5 AEROSOL RESPIRATORY (INHALATION) at 15:52

## 2020-06-04 ASSESSMENT — PAIN SCALES - GENERAL
PAINLEVEL_OUTOF10: 4
PAINLEVEL_OUTOF10: 0
PAINLEVEL_OUTOF10: 2
PAINLEVEL_OUTOF10: 0
PAINLEVEL_OUTOF10: 4

## 2020-06-04 ASSESSMENT — PAIN DESCRIPTION - LOCATION: LOCATION: HIP

## 2020-06-04 ASSESSMENT — PAIN DESCRIPTION - PAIN TYPE: TYPE: SURGICAL PAIN

## 2020-06-04 NOTE — PLAN OF CARE
Problem: Falls - Risk of:  Goal: Will remain free from falls  Description: Will remain free from falls  6/4/2020 1122 by Delroy Diehl RN  Outcome: Ongoing  6/4/2020 0022 by Kadni Sheikh RN  Outcome: Ongoing  Goal: Absence of physical injury  Description: Absence of physical injury  6/4/2020 1122 by Delroy Diehl RN  Outcome: Ongoing  6/4/2020 0022 by Kandi Sheikh RN  Outcome: Ongoing     Problem: ABCDS Injury Assessment  Goal: Absence of physical injury  6/4/2020 1122 by Delroy Diehl RN  Outcome: Ongoing  6/4/2020 0022 by Kandi Sheikh RN  Outcome: Ongoing     Problem: Pain:  Goal: Pain level will decrease  Description: Pain level will decrease  6/4/2020 1122 by Delroy Diehl RN  Outcome: Ongoing  6/4/2020 0022 by Kandi Sheikh RN  Outcome: Ongoing  Goal: Control of acute pain  Description: Control of acute pain  6/4/2020 1122 by Delroy Diehl RN  Outcome: Ongoing  6/4/2020 0022 by Kandi Sheikh RN  Outcome: Ongoing  Goal: Control of chronic pain  Description: Control of chronic pain  6/4/2020 1122 by Delroy Diehl RN  Outcome: Ongoing  6/4/2020 0022 by Kandi Sheikh RN  Outcome: Ongoing     Problem: Mobility - Impaired:  Goal: Mobility will improve  Description: Mobility will improve  6/4/2020 1122 by Delroy Diehl RN  Outcome: Ongoing  6/4/2020 0022 by Kandi Sheikh RN  Outcome: Ongoing     Problem: Nutrition  Goal: Optimal nutrition therapy  6/4/2020 1122 by Delroy Diehl RN  Outcome: Ongoing  6/4/2020 0022 by Kandi Sheikh RN  Outcome: Ongoing

## 2020-06-04 NOTE — PROGRESS NOTES
air)  O2 Flow Rate (L/min): 3 L/min    DIET GENERAL; Carb Control: 4 carb choices (60 gms)/meal  Dietary Nutrition Supplements: Diabetic Oral Supplement     MEDICATIONS during current hospitalization:    Continuous Infusions:    Scheduled Meds:   [START ON 6/5/2020] magnesium oxide  400 mg Oral Lunch    ciprofloxacin  500 mg Oral 2 times per day    lisinopril  40 mg Oral Nightly    tiotropium  2 puff Inhalation Daily    budesonide-formoterol  2 puff Inhalation BID    pantoprazole  20 mg Oral QAM AC    cyanocobalamin  1,000 mcg Intramuscular Weekly    Vitamin D  2,000 Units Oral Dinner    lidocaine  3 patch Transdermal Daily    coenzyme Q10  100 mg Oral BID AC    acetaminophen  650 mg Oral Q6H    warfarin  5 mg Oral Daily    hydroxychloroquine  100 mg Oral BID    montelukast  10 mg Oral Nightly    sotalol  120 mg Oral BID       PRN Meds:acetaminophen, fleet, magnesium hydroxide, albuterol sulfate HFA, HYDROcodone 5 mg - acetaminophen **OR** HYDROcodone 5 mg - acetaminophen **OR** HYDROcodone-acetaminophen, methocarbamol    Radiology  Xr Chest (single View Frontal)    Result Date: 5/26/2020  EXAMINATION: CHEST PORTABLE VIEW  CLINICAL HISTORY: Short of breath COMPARISONS: May 25, 2020  FINDINGS: 2 views of the chest is submitted. Left-sided CCD device. Leads overlying the cardiac silhouette. Unchanged  The cardiac silhouette is of normal size configuration. Pulmonary vascular attenuated. Lung fields are hyperinflated. Coarse interstitium. Right sided trachea. No focal infiltrates. No Pneumothoraces. NO ACUTE ACTIVE CARDIOPULMONARY PROCESS. RADIOGRAPHIC FINDINGS OF COPD    Xr Chest (single View Frontal)    Result Date: 5/26/2020  XR CHEST (SINGLE VIEW FRONTAL) Exam Date/Time:  5/25/2020 5:15 PM Clinical History:   PAIN    Fall .  Comparison:  11/5/2011  RESULT: Lines, tubes, and devices:  Left transvenous pacemaker with

## 2020-06-04 NOTE — PROGRESS NOTES
Physical Therapy Rehab Treatment Note  Facility/Department: [facility] [place] [facility]  [facility]       NAME: Karen Swann  : 1935 (81 y.o.)  MRN: [id number]  CODE STATUS: Full Code    Date of Service: 2020  Chart Reviewed: Yes  Family / Caregiver Present: No  General Comment  Comments: Reported pain level of 4/10 post session    Restrictions:  Restrictions/Precautions: Weight Bearing, Fall Risk  Lower Extremity Weight Bearing Restrictions  Left Lower Extremity Weight Bearing: Partial Weight Bearing  Partial Weight Bearing Percentage Or Pounds: 25%       SUBJECTIVE:    Pain Screening  Patient Currently in Pain: No       Post Treatment Pain Screenin/10       OBJECTIVE:               Balance  Comments: Performed modified [employer] balance activities in wheeled walker to further promote weightbearing and gain hip stability while ambulating. Performed 1 min x 4 with CGA, occassional cues for weightbearing status. Transfers  Sit to Stand: Contact guard assistance  Stand to sit: Contact guard assistance    Ambulation  Ambulation?: Yes  Ambulation 1  Surface: carpet;level tile  Device: Rolling Walker  Other Apparatus: O2  Assistance: Contact guard assistance  Quality of Gait: Demonstrated ability to maintain 25% PWB on left leg. Pt reported leg fatigue at end of gait. Gait Deviations: Slow Marivel;Decreased step length  Distance: 2 x 60'  Comments: Pt used 1L O2 during ambulations and required additional rest between bouts. SpO2 after ambulations wer 92% and 94%. Stairs/Curb  Stairs?: Yes  Stairs  # Steps : 1  Stairs Height: 6\"  Rails: Bilateral  Assistance: Contact guard assistance;Minimal assistance  Comment: Performed 6\" x 5 step ups on stair with bilateral support to faciliate proper stair climbing technique and to gain strength for climbing stairs. Occassional VC to maintain weightbearing status.           Activity Tolerance  Activity Tolerance: Patient Tolerated treatment well  Activity Tolerance: Pt reported being tired after ambulation and stairs. Needed additional rest breaks. Exercises  Hamstring Sets: seated HS curls x 20 YTB  Quad Sets: x20  Heelslides: seated with slider x 20  Gluteal Sets: x20  Hip Abduction: x 20 with ball  Knee Long Arc Quad: x 20  Knee Short Arc Quad: 2 x 10   Ankle Pumps: x20  Other exercises  Other exercises 1: Hip adduction with ball x 20     ASSESSMENT/COMMENTS:  Body structures, Functions, Activity limitations: Decreased functional mobility ; Decreased balance;Decreased endurance;Decreased strength; Increased pain  Assessment: Pt demonstrated good understanding of 25% PWB while ambulating. Pt showed fatigue after each ambulation and required additional rest. Pt demonstrated lateral swaying while performing [employer] balance activities.     PLAN OF CARE/Safety:   Safety Devices  Type of devices: Left in chair;Chair alarm in place;Gait belt      Therapy Time:   Individual   Time In 1430   Time Out 1530   Minutes 60     Minutes:60      Transfer/Bed mobility trainin      Gait trainin      Neuro re education:5     Therapeutic ex:30      Jesus Fruits 20 at 4:49 PM    Electronically signed by Roman Seymour on 2020 at 4:50 PM

## 2020-06-04 NOTE — PROGRESS NOTES
structures, Functions, Activity limitations: Decreased functional mobility ; Decreased balance;Decreased endurance;Decreased strength; Increased pain  Assessment: Pt demonstrated better understanding of proper bed mobility, mentioned using techniques in room. Pt required min/mod assist for LE for sit to supine transfer. Pt was supervision plus additional VC for supine to sit transfer.      PLAN OF CARE/Safety:   Safety Devices  Type of devices: Left in chair;Chair alarm in place;Gait belt      Therapy Time:   Individual   Time In 1030   Time Out 1130   Minutes 60     Minutes:60      Transfer/Bed mobility training: 15      Gait training: 15      Neuro re education:0     Therapeutic ex: 30      Kamilla Garcia 06/04/20 at 12:07 PM    Electronically signed by Navjot Chapman on 6/4/2020 at 12:12 PM

## 2020-06-04 NOTE — PROGRESS NOTES
errors and 100% legibility. Patient able to write in 4 deductions (gas bill $88.29, electric bill $565.86, water bill $69.73, phone bill automatic transaction $57.46) with 0 difficulty, 7/15 errors and 100% legibility. Patient able to write in 1 deposit of $562.68 in checkbook register with 0 difficulty, 2/3 errors and 100% legibility. Patient able to sum up balance of checkbook with 60% accuracy utilizing calculator. Patient able to address envelope correctly with 100% legibility. Upper Extremity Function  UE Strengthing: Patient engaged in B UE ROM/strengthening, B FM coordination and cognition to increase I with ADL's, IADL's and transfers. Patient donned B 1# wrist weights. Patient able to reach in various vertical planes with MIN difficulty. Patient placed 60 various sized rubber washers on matching vertical dowel rods of varying heights. Patient with 0 difficulty picking up rubber washers. Patient with 0 difficulty matching rubber washers to correct dowel rods. Patient able to remove washers from dowel rods 1 at a time with MIN difficulty. RB's as needed. Plan   Plan  Times per week: 5-7 times per week  Plan weeks: 2 weeks  Current Treatment Recommendations: Strengthening, Functional Mobility Training, Endurance Training, Balance Training, Neuromuscular Re-education, Self-Care / ADL, Equipment Evaluation, Education, & procurement, Safety Education & Training, Patient/Caregiver Education & Training    Plan Comment: Continue per OT POC for planned d/c on 6-10-20        Goals  Patient Goals   Patient goals :  \"To learn how to get my leg back so I can get up and on my own again\"       Therapy Time   Individual Concurrent Group Co-treatment   Time In 1300         Time Out 1400         Minutes 60             ADL trainin minutes  Therapeutic activities: 30 minutes       Electronically signed by FAROOQ Chester on 20 at 3:23 PM EDT      FAROOQ Chester

## 2020-06-05 LAB
INR BLD: 1.9
PROTHROMBIN TIME: 21.4 SEC (ref 12.3–14.9)

## 2020-06-05 PROCEDURE — 97535 SELF CARE MNGMENT TRAINING: CPT

## 2020-06-05 PROCEDURE — 2700000000 HC OXYGEN THERAPY PER DAY

## 2020-06-05 PROCEDURE — 94761 N-INVAS EAR/PLS OXIMETRY MLT: CPT

## 2020-06-05 PROCEDURE — G0108 DIAB MANAGE TRN  PER INDIV: HCPCS

## 2020-06-05 PROCEDURE — 1180000000 HC REHAB R&B

## 2020-06-05 PROCEDURE — 6370000000 HC RX 637 (ALT 250 FOR IP): Performed by: PHYSICAL MEDICINE & REHABILITATION

## 2020-06-05 PROCEDURE — 97110 THERAPEUTIC EXERCISES: CPT

## 2020-06-05 PROCEDURE — 6370000000 HC RX 637 (ALT 250 FOR IP): Performed by: NURSE PRACTITIONER

## 2020-06-05 PROCEDURE — 85610 PROTHROMBIN TIME: CPT

## 2020-06-05 PROCEDURE — 6370000000 HC RX 637 (ALT 250 FOR IP): Performed by: INTERNAL MEDICINE

## 2020-06-05 PROCEDURE — 97116 GAIT TRAINING THERAPY: CPT

## 2020-06-05 PROCEDURE — 99232 SBSQ HOSP IP/OBS MODERATE 35: CPT | Performed by: INTERNAL MEDICINE

## 2020-06-05 PROCEDURE — 36415 COLL VENOUS BLD VENIPUNCTURE: CPT

## 2020-06-05 PROCEDURE — 94640 AIRWAY INHALATION TREATMENT: CPT

## 2020-06-05 RX ORDER — FUROSEMIDE 20 MG/1
20 TABLET ORAL DAILY
Status: DISCONTINUED | OUTPATIENT
Start: 2020-06-05 | End: 2020-06-10 | Stop reason: HOSPADM

## 2020-06-05 RX ADMIN — HYDROXYCHLOROQUINE SULFATE 100 MG: 200 TABLET, FILM COATED ORAL at 09:10

## 2020-06-05 RX ADMIN — SOTALOL HYDROCHLORIDE 120 MG: 120 TABLET ORAL at 09:10

## 2020-06-05 RX ADMIN — BUDESONIDE AND FORMOTEROL FUMARATE DIHYDRATE 2 PUFF: 80; 4.5 AEROSOL RESPIRATORY (INHALATION) at 04:44

## 2020-06-05 RX ADMIN — Medication 100 MG: at 12:18

## 2020-06-05 RX ADMIN — CIPROFLOXACIN 500 MG: 500 TABLET, FILM COATED ORAL at 21:48

## 2020-06-05 RX ADMIN — HYDROXYCHLOROQUINE SULFATE 100 MG: 200 TABLET, FILM COATED ORAL at 21:46

## 2020-06-05 RX ADMIN — ACETAMINOPHEN 650 MG: 325 TABLET ORAL at 12:18

## 2020-06-05 RX ADMIN — PANTOPRAZOLE SODIUM 20 MG: 20 TABLET, DELAYED RELEASE ORAL at 06:00

## 2020-06-05 RX ADMIN — SOTALOL HYDROCHLORIDE 120 MG: 120 TABLET ORAL at 21:46

## 2020-06-05 RX ADMIN — ACETAMINOPHEN 650 MG: 325 TABLET ORAL at 19:02

## 2020-06-05 RX ADMIN — WARFARIN SODIUM 5 MG: 5 TABLET ORAL at 19:02

## 2020-06-05 RX ADMIN — MONTELUKAST 10 MG: 10 TABLET, FILM COATED ORAL at 21:53

## 2020-06-05 RX ADMIN — ACETAMINOPHEN 650 MG: 325 TABLET ORAL at 06:01

## 2020-06-05 RX ADMIN — BUDESONIDE AND FORMOTEROL FUMARATE DIHYDRATE 2 PUFF: 80; 4.5 AEROSOL RESPIRATORY (INHALATION) at 17:01

## 2020-06-05 RX ADMIN — VITAMIN D, TAB 1000IU (100/BT) 2000 UNITS: 25 TAB at 17:04

## 2020-06-05 RX ADMIN — HYDROCODONE BITARTRATE AND ACETAMINOPHEN 0.5 TABLET: 5; 325 TABLET ORAL at 21:46

## 2020-06-05 RX ADMIN — LISINOPRIL 40 MG: 20 TABLET ORAL at 21:48

## 2020-06-05 RX ADMIN — FUROSEMIDE 20 MG: 20 TABLET ORAL at 15:03

## 2020-06-05 RX ADMIN — TIOTROPIUM BROMIDE INHALATION SPRAY 2 PUFF: 3.12 SPRAY, METERED RESPIRATORY (INHALATION) at 04:44

## 2020-06-05 RX ADMIN — CIPROFLOXACIN 500 MG: 500 TABLET, FILM COATED ORAL at 09:21

## 2020-06-05 RX ADMIN — Medication 400 MG: at 12:18

## 2020-06-05 RX ADMIN — Medication 100 MG: at 09:10

## 2020-06-05 ASSESSMENT — PAIN SCALES - GENERAL
PAINLEVEL_OUTOF10: 4
PAINLEVEL_OUTOF10: 5
PAINLEVEL_OUTOF10: 2
PAINLEVEL_OUTOF10: 0
PAINLEVEL_OUTOF10: 4
PAINLEVEL_OUTOF10: 4

## 2020-06-05 ASSESSMENT — PAIN DESCRIPTION - LOCATION: LOCATION: HIP

## 2020-06-05 ASSESSMENT — PAIN DESCRIPTION - ORIENTATION: ORIENTATION: LEFT

## 2020-06-05 ASSESSMENT — PAIN DESCRIPTION - DESCRIPTORS: DESCRIPTORS: ACHING;SORE

## 2020-06-05 ASSESSMENT — PAIN DESCRIPTION - PAIN TYPE: TYPE: SURGICAL PAIN

## 2020-06-05 NOTE — PLAN OF CARE
Nutrition Problem: Inadequate oral intake(improving)  Intervention: Food and/or Nutrient Delivery: Modify current diet, Continue current ONS(Carb Control 3 )  Nutritional Goals: Intake >75% of meals/ supplement. Stable weight.  BG-wnl.

## 2020-06-05 NOTE — PROGRESS NOTES
Occupational Therapy  Facility/Department: [facility]  Daily Treatment Note  NAME: Jeromy Parker  : 1935  MRN: [id number]    Date of Service: 2020    Discharge Recommendations:  Continue to assess pending progress       Assessment      Activity Tolerance  Activity Tolerance: Patient Tolerated treatment well  Activity Tolerance: 1L O2 nasal canula  Safety Devices  Safety Devices in place: Yes  Type of devices: All fall risk precautions in place         Patient Diagnosis(es): There were no encounter diagnoses. has a past medical history of COPD (chronic obstructive pulmonary disease) ([facility] [address] Hypertension, and Lupus ([facility] [address] has a past surgical history that includes Cholecystectomy; Appendectomy; Elbow surgery (Left); Cardiac pacemaker placement; and Femur fracture surgery (Left, 2020). Restrictions  Restrictions/Precautions  Restrictions/Precautions: Weight Bearing, Fall Risk  Implants present? : Pacemaker  Lower Extremity Weight Bearing Restrictions  Left Lower Extremity Weight Bearing: Partial Weight Bearing  Partial Weight Bearing Percentage Or Pounds: 25%    Subjective   General  Chart Reviewed: Yes  Patient assessed for rehabilitation services?: Yes  Response to previous treatment: Patient with no complaints from previous session  Family / Caregiver Present: No  Referring Practitioner: Dr Nery Shaikh  Diagnosis: Imp Mob 2° intertrochanteric fracture S/P IM nailing    Subjective  Subjective: I'd still like to shower.     Pain Assessment  Pain Level: 0  Pre Treatment Pain Screening  Pain at present: 0     Orientation  Orientation  Overall Orientation Status: Within Functional Limits     Objective      ADL    Equipment Provided: Reacher;Sock aid;Long-handled shoe horn;Long-handled sponge;Dressing stick    Grooming: Setup    UE Bathing: Setup    LE Bathing: Setup    UE Dressing: Setup    LE Dressing: Supervision    Toileting: None(denied need)     Shower Transfers  Shower - Transfer From: Walker  Shower - Transfer Type: To and From  Shower - Transfer To: Shower seat with back  Shower - Technique: Ambulating  Shower Transfers: Supervision  Shower Transfers Comments: Patient able to maintain PWB 25% LLE, grab bars      Plan   Plan  Times per week: 5-7 times per week  Plan weeks: 2 weeks  Current Treatment Recommendations: Strengthening, Functional Mobility Training, Endurance Training, Balance Training, Neuromuscular Re-education, Self-Care / ADL, Equipment Evaluation, Education, & procurement, Safety Education & Training, Patient/Caregiver Education & Training    Plan Comment: Continue per OT POC for planned d/c on 6-10-20     Goals  Patient Goals   Patient goals :  \"To learn how to get my leg back so I can get up and on my own again\"       Therapy Time   Individual Concurrent Group Co-treatment   Time In 1300         Time Out 1400         Minutes 60             ADL trainin minutes       Electronically signed by FAROOQ Griffin on 20 at 3:07 PM EDT      FAROOQ Griffin

## 2020-06-05 NOTE — PROGRESS NOTES
Subjective: The patient complains of severe  acute left hip fracture partially relieved by rest, ice, opiate medication list so relieved by plain Tylenol and exacerbated by weightbearing. I am concerned about patients bleeding risk on Coumadin. ROS x10: The patient also complains of severely impaired mobility and activities of daily living. Otherwise no new problems with vision, hearing, nose, mouth, throat, dermal, cardiovascular, GI, , pulmonary, musculoskeletal, psychiatric or neurological. See Rehab H&P on Rehab chart dated . Vital signs:  BP (!) 143/75   Pulse 71   Temp 98 °F (36.7 °C) (Oral)   Resp 18   Ht 5' 5\" (1.651 m)   Wt 163 lb (73.9 kg)   LMP  (LMP Unknown)   SpO2 93%   BMI 27.12 kg/m²   I/O:   PO/Intake:  fair PO intake, no problems observed or reported. Bowel/Bladder:  continent, frequent nocturnal urination-UTI pure wick catheter at night  General:  Patient is well developed, adequately nourished, non-obese and     well kempt. HEENT:    PERRLA, hearing intact to loud voice, external inspection of ear     and nose benign. Inspection of lips, tongue and gums benign  Musculoskeletal: No significant change in strength or tone. All joints stable. Inspection and palpation of digits and nails show no clubbing,       cyanosis or inflammatory conditions. Neuro/Psychiatric: Affect: flat but pleasant. Alert and oriented to person, place and     situation. No significant change in deep tendon reflexes or     sensation  Lungs:  Diminished, CTA-B. Respiration effort is normal at rest.     Heart:   S1 = S2, irregular. No loud murmurs. Abdomen:  Soft, non-tender, no enlargement of liver or spleen. Extremities:  No significant lower extremity edema or tenderness.   Skin:   Intact to general survey, left hip incision healing    Rehabilitation:  Physical therapy: FIMS:  Bed Mobility:      Transfers: Sit to Stand: Stand by assistance, Contact guard assistance  Stand or extra-axial findings in the brain. There is no hydrocephalus, intracranial mass, hemorrhage, \"mass effect\" or midline shift. The remainder of the CT scan of the brain appears unremarkable. 1. CEREBRAL ATROPHY AND AGE RELATED FINDINGS IN THE BRAIN. 2. NO ACUTE INTRA-AXIAL OR EXTRA-AXIAL FINDINGS IN THE BRAIN. Ct Cervical Spine : 5/25/2020   1. DEGENERATIVE AND ARTHRITIC CHANGES THROUGHOUT THE C-SPINE AS DESCRIBED. 2.  NO ACUTE FRACTURE, SUBLUXATION OR DISLOCATION INVOLVING THE CERVICAL SPINE. Xr Chest   5/28/2020   FINDINGS: Single  views of the chest is submitted. Left-sided CCD device. Leads overlying the cardiac silhouette. Unchanged The cardiac silhouette is of normal size configuration. . Pulmonary vascular unremarkable. Right sided trachea. No focal infiltrates. No Pneumothoraces. Old healed right humeral head fracture                                                                                   NO ACUTE ACTIVE CARDIOPULMONARY PROCESS    Fluoro For Surgical  5/27/2020  EXAMINATION:  FLUORO FOR SURGICAL PROCEDURES CLINICAL HISTORY:  ORIF Left Femur COMPARISONS:  Left femur radiographs 5/26/2020 TECHNIQUE: Intraoperative fluoroscopy. Fluoroscopic time 34.9 seconds. 9 screen capture images. FINDINGS:  Submitted screen capture images document left femoral TFN fixation in near-anatomic alignment. See operative report for correlation. FLUOROSCOPIC INTRAOPERATIVE IMAGING ASSISTANCE FOR LEFT HIP TFN ORIF     Xr Hip 2-3 Vw W Pelvis Left : 5/26/2020    Acute complex left proximal femur intertrochanteric fracture, with fracture line extending to involve both the greater and lesser trochanters, with multiple small fracture fragments at the fracture sites and displacement measuring up to 15 mm. Alignment of left hip appears maintained. Degenerative changes lower lumbar spine and SI joints. Right hip unremarkable. No other fracture in the distal femur on the dedicated left femur radiographs. effective dose, modalities prn in therapy, Lidoderm, K-pad prn. Patient is on daily Deltasone for her lungs causing osteoporosis  4. Skin healing and breakdown risk:  continue pressure relief program.  Daily skin exams and reports from nursing. 5. Severe fatigue due to nutritional and hydration deficiency: Add vitamin B12 vitamin D and CoQ10 continue to monitor I&Os, calorie counts prn, dietary consult prn.  6. Acute episodic insomnia with situational adjustment disorder:  prn Ambien, monitor for day time sedation. 7. Falls risk elevated:  patient to use call light to get nursing assistance to get up, bed and chair alarm. 8. Elevated DVT risk: progressive activities in PT, continue prophylaxis ALHAJI hose, elevation and Coumadin. 9. Complex discharge planning: Discharge 6/10/2020 home with her family with home health care. We will progress toward her final weekly team meeting  Monday to assess progress towards goals, discuss and address social, psychological and medical comorbidities and to address difficulties they may be having progressing in therapy. Patient and family education is in progress. The patient is to follow-up with their family physician after discharge. Complex Active General Medical Issues that complicate care Assess & Plan:    1. Closed intertrochanteric fracture of hip, left-status post intramedullary nailing fracture was due to a fall follow-up with orthopedics weightbearing per orthopedics  2. Chronic obstructive pulmonary disease,   Respiratory failure, Obstructive sleep apnea (adult) with noncompliance at least in the hospital-pulse ox checks to shift encourage use of CPAP prescribed weight loss add aerosol treatments daily Deltasone for her lungs and lupus. Nasal cannula O2 and elevate head of bed with Afrin nasal spray at night if patient is noncompliant with the CPAP.   3.   Hyperlipidemia,   Atrial fibrillation, Coronary atherosclerosis, Essential hypertension, Peripheral vascular disease, Sick sinus syndrome-vital signs every shift dose and titrate cardiac medications to include Coumadin, Prinivil, Betapace, consult hospitalist for backup medical  4. Tobacco user-stop smoking counseling-add NicoDerm  5. Vitamin D deficiency-add vitamin D treat for osteoporosis  6. Hyponatremia, Hyperkalemia-supplement accordingly recheck BMP-consider holding ACE inhibitor  7. Whole body arthritis and inflammation due to Lupus-monitor for blood clots patient is on Coumadin she is on daily Deltasone for her lungs and lupus as well as Plaquenil  8. Steroid-induced type 2 diabetes-limit steroids to lowest effective dose add carbohydrate restriction of the diet check hemoglobin A1c  9.  Acute UTI-trial Macrobid check postvoid residuals monitor for retention correct blood sugars    Obdulio Hernandez D.O., PM&R     Attending    [facility] [place]

## 2020-06-05 NOTE — PROGRESS NOTES
Physical Therapy Rehab Treatment Note  Facility/Department: [facility] [place] [facility]  [facility]       NAME: Josefina Lyons  : 1935 (81 y.o.)  MRN: [id number]  CODE STATUS: Full Code    Date of Service: 2020  Chart Reviewed: Yes  Family / Caregiver Present: No    Restrictions:  Restrictions/Precautions: Weight Bearing, Fall Risk  Lower Extremity Weight Bearing Restrictions  Left Lower Extremity Weight Bearing: Partial Weight Bearing  Partial Weight Bearing Percentage Or Pounds: 25%       SUBJECTIVE: Subjective:   Pain Screening  Patient Currently in Pain: Yes       Post Treatment Pain Screenin/10  Pain Assessment  Pain Assessment: 0-10  Pain Level: 4  Pain Type: Surgical pain  Pain Location: Hip  Pain Orientation: Left  Pain Descriptors: Aching; Sore    OBJECTIVE:                      Transfers  Sit to Stand: Stand by assistance;Contact guard assistance  Stand to sit: Stand by assistance;Contact guard assistance      Ambulation  Ambulation?: Yes  Ambulation 1  Surface: carpet;level tile  Device: Rolling Walker  Other Apparatus: O2(1 L O2 )  Assistance: Contact guard assistance  Quality of Gait: Slow reciprocal gait pattern with NBOS, decreased step length flat foot, shuffling pattern, Pt continues to disaplay proper WB with L Knee flexed toe walking pattern. Gait Deviations: Slow Marivel;Decreased step length  Distance: 150ft   Comments: SpO2 of 96% following ambualtion today while on 1 L of O2     Stairs/Curb  Stairs?: Yes  Stairs  Stairs Height: 6\"  Rails: Bilateral              Exercises  Hamstring Sets: seated HS curls x 20 YTB  Heelslides: supine with slide board  x 20  Hip Abduction: x 20 Salaazr. AAROM on L LE. Knee Long Arc Quad: x 20 (L LE - x10)  Knee Short Arc Quad:  x 10   Ankle Pumps: x20     ASSESSMENT/COMMENTS:  Body structures, Functions, Activity limitations: Decreased functional mobility ; Decreased balance;Decreased endurance;Decreased strength; Increased pain  Assessment: Pt remains in

## 2020-06-05 NOTE — PROGRESS NOTES
%  · Oral Nutrition Supplement (ONS) Orders: Diabetic Oral Supplement(x1/day)  · ONS intake: 51-75%, %  · Anthropometric Measures:  · Ht: 5' 5\" (165.1 cm)   · Current Body Wt: 163 lb (73.9 kg)(6-5 bedscale, edema noted)  · Admission Body Wt: 160 lb (72.6 kg)(stated, initial admission on 5/25/20)  · Usual Body Wt: 157 lb (71.2 kg)(12/2/19-office visit, 159 lb (8/29/19) office visit)  · % Weight Change:  ,  no significant change  · Ideal Body Wt: 125 lb (56.7 kg), % Ideal Body > 100%  · BMI Classification: BMI 25.0 - 29.9 Overweight(26.6 if admission wt is accurate)    Nutrition Interventions:   Modify current diet, Continue current ONS(Carb Control 3 )  Continued Inpatient Monitoring, Education Needed    Nutrition Evaluation:   · Evaluation: Progressing toward goals   · Goals: Intake >75% of meals/ supplement. Stable weight.  BG-wnl.    · Monitoring: Meal Intake, Supplement Intake, Weight, Pertinent Labs      Electronically signed by Santiago Reyes RD, LD on 6/5/20 at 2:40 PM EDT

## 2020-06-05 NOTE — PROGRESS NOTES
RESULT: Lines, tubes, and devices:  Left transvenous pacemaker with leads in the region of the right atrium and right ventricle, unchanged. Lungs and pleura:  Emphysematous changes. No focal consolidation. No pleural effusion. No pneumothorax. Normal pulmonary vascular pattern. Cardiomediastinal silhouette:  Normal. Aortic atherosclerotic calcification. Other:  No acute osseous findings. Surgical clips right upper quadrant. No acute radiographic abnormality or significant interval change. Emphysema    Xr Femur Left (min 2 Views)    Result Date: 5/26/2020  EXAMINATION:  XR FEMUR LEFT (MIN 2 VIEWS), XR HIP 2-3 VW W PELVIS LEFT HISTORY:   PAIN    Fall . Tripped over nebulizer tubing, fell on left hip. Left hip pain. TECHNIQUE:  XR FEMUR LEFT (MIN 2 VIEWS), XR HIP 2-3 VW W PELVIS LEFT COMPARISON: Correlation with abdominal radiographs from 3/1/2012. RESULT: Acute complex left proximal femur intertrochanteric fracture, with fracture line extending to involve both the greater and lesser trochanters, with multiple small fracture fragments at the fracture sites and displacement measuring up to 15 mm. Alignment of left hip appears maintained. Degenerative changes lower lumbar spine and SI joints. Right hip unremarkable. No other fracture in the distal femur on the dedicated left femur radiographs. Alignment is grossly maintained. No other significant abnormality. ---------------------------------------------     Left femur complex intertrochanteric fracture. Ct Head Wo Contrast    Result Date: 5/25/2020  EXAM: CT SCAN OF THE BRAIN WITHOUT CONTRAST COMPARISON: 5/3/2013 REASONS FOR EXAMINATION:     TRAUMA, HEAD INJURY IN FALL, PATIENT STRUCK HEAD IN FALL, HEADACHE TECHNIQUE:  CT brain is obtained without IV contrast agent. FINDINGS: An unenhanced CT scan of the brain demonstrates no evidence of a skull fracture. There is cerebral atrophy and age related findings in the brain. There is no acute CVA.   There is no

## 2020-06-06 LAB
INR BLD: 1.8
PROTHROMBIN TIME: 21.3 SEC (ref 12.3–14.9)

## 2020-06-06 PROCEDURE — 6370000000 HC RX 637 (ALT 250 FOR IP): Performed by: PHYSICAL MEDICINE & REHABILITATION

## 2020-06-06 PROCEDURE — 2700000000 HC OXYGEN THERAPY PER DAY

## 2020-06-06 PROCEDURE — 94761 N-INVAS EAR/PLS OXIMETRY MLT: CPT

## 2020-06-06 PROCEDURE — 6370000000 HC RX 637 (ALT 250 FOR IP): Performed by: NURSE PRACTITIONER

## 2020-06-06 PROCEDURE — 6360000002 HC RX W HCPCS: Performed by: PHYSICAL MEDICINE & REHABILITATION

## 2020-06-06 PROCEDURE — 36415 COLL VENOUS BLD VENIPUNCTURE: CPT

## 2020-06-06 PROCEDURE — 94640 AIRWAY INHALATION TREATMENT: CPT

## 2020-06-06 PROCEDURE — 1180000000 HC REHAB R&B

## 2020-06-06 PROCEDURE — 6370000000 HC RX 637 (ALT 250 FOR IP): Performed by: INTERNAL MEDICINE

## 2020-06-06 PROCEDURE — 85610 PROTHROMBIN TIME: CPT

## 2020-06-06 RX ORDER — WARFARIN SODIUM 2 MG/1
2 TABLET ORAL
Status: COMPLETED | OUTPATIENT
Start: 2020-06-06 | End: 2020-06-06

## 2020-06-06 RX ADMIN — TIOTROPIUM BROMIDE INHALATION SPRAY 2 PUFF: 3.12 SPRAY, METERED RESPIRATORY (INHALATION) at 04:20

## 2020-06-06 RX ADMIN — HYDROXYCHLOROQUINE SULFATE 100 MG: 200 TABLET, FILM COATED ORAL at 08:19

## 2020-06-06 RX ADMIN — HYDROCODONE BITARTRATE AND ACETAMINOPHEN 1 TABLET: 5; 325 TABLET ORAL at 08:25

## 2020-06-06 RX ADMIN — CIPROFLOXACIN 500 MG: 500 TABLET, FILM COATED ORAL at 08:20

## 2020-06-06 RX ADMIN — WARFARIN SODIUM 2 MG: 2 TABLET ORAL at 17:40

## 2020-06-06 RX ADMIN — LISINOPRIL 40 MG: 20 TABLET ORAL at 21:29

## 2020-06-06 RX ADMIN — SOTALOL HYDROCHLORIDE 120 MG: 120 TABLET ORAL at 21:29

## 2020-06-06 RX ADMIN — PANTOPRAZOLE SODIUM 20 MG: 20 TABLET, DELAYED RELEASE ORAL at 06:09

## 2020-06-06 RX ADMIN — SOTALOL HYDROCHLORIDE 120 MG: 120 TABLET ORAL at 08:19

## 2020-06-06 RX ADMIN — Medication 400 MG: at 11:38

## 2020-06-06 RX ADMIN — FUROSEMIDE 20 MG: 20 TABLET ORAL at 08:20

## 2020-06-06 RX ADMIN — CIPROFLOXACIN 500 MG: 500 TABLET, FILM COATED ORAL at 21:30

## 2020-06-06 RX ADMIN — CYANOCOBALAMIN 1000 MCG: 1000 INJECTION, SOLUTION INTRAMUSCULAR; SUBCUTANEOUS at 08:34

## 2020-06-06 RX ADMIN — ACETAMINOPHEN 650 MG: 325 TABLET ORAL at 11:38

## 2020-06-06 RX ADMIN — Medication 100 MG: at 08:19

## 2020-06-06 RX ADMIN — BUDESONIDE AND FORMOTEROL FUMARATE DIHYDRATE 2 PUFF: 80; 4.5 AEROSOL RESPIRATORY (INHALATION) at 04:21

## 2020-06-06 RX ADMIN — Medication 100 MG: at 11:38

## 2020-06-06 RX ADMIN — WARFARIN SODIUM 5 MG: 5 TABLET ORAL at 17:15

## 2020-06-06 RX ADMIN — VITAMIN D, TAB 1000IU (100/BT) 2000 UNITS: 25 TAB at 17:15

## 2020-06-06 RX ADMIN — HYDROXYCHLOROQUINE SULFATE 100 MG: 200 TABLET, FILM COATED ORAL at 21:30

## 2020-06-06 RX ADMIN — BUDESONIDE AND FORMOTEROL FUMARATE DIHYDRATE 2 PUFF: 80; 4.5 AEROSOL RESPIRATORY (INHALATION) at 15:59

## 2020-06-06 RX ADMIN — ACETAMINOPHEN 650 MG: 325 TABLET ORAL at 06:09

## 2020-06-06 RX ADMIN — ACETAMINOPHEN 650 MG: 325 TABLET ORAL at 17:15

## 2020-06-06 RX ADMIN — MONTELUKAST 10 MG: 10 TABLET, FILM COATED ORAL at 21:29

## 2020-06-06 ASSESSMENT — PAIN SCALES - GENERAL
PAINLEVEL_OUTOF10: 5
PAINLEVEL_OUTOF10: 2
PAINLEVEL_OUTOF10: 4
PAINLEVEL_OUTOF10: 4

## 2020-06-06 NOTE — PROGRESS NOTES
Assessment completed,Last BM 5/6/20. Continent of bowel and bladder this shift. Prn Pain Pill given per order,see mar. Surgical incision remain closed,no drainage noted. Patient slept well this shift. No change in mental status. Bed alarm on,call light within reach.  Electronically signed by Parminder Chu RN on 6/6/20 at 4:24 AM EDT

## 2020-06-07 LAB
INR BLD: 2
PROTHROMBIN TIME: 22.6 SEC (ref 12.3–14.9)

## 2020-06-07 PROCEDURE — 6370000000 HC RX 637 (ALT 250 FOR IP): Performed by: PHYSICAL MEDICINE & REHABILITATION

## 2020-06-07 PROCEDURE — 6370000000 HC RX 637 (ALT 250 FOR IP): Performed by: NURSE PRACTITIONER

## 2020-06-07 PROCEDURE — 97116 GAIT TRAINING THERAPY: CPT

## 2020-06-07 PROCEDURE — 97535 SELF CARE MNGMENT TRAINING: CPT

## 2020-06-07 PROCEDURE — 85610 PROTHROMBIN TIME: CPT

## 2020-06-07 PROCEDURE — 2700000000 HC OXYGEN THERAPY PER DAY

## 2020-06-07 PROCEDURE — 36415 COLL VENOUS BLD VENIPUNCTURE: CPT

## 2020-06-07 PROCEDURE — 6370000000 HC RX 637 (ALT 250 FOR IP): Performed by: INTERNAL MEDICINE

## 2020-06-07 PROCEDURE — 94760 N-INVAS EAR/PLS OXIMETRY 1: CPT

## 2020-06-07 PROCEDURE — 1180000000 HC REHAB R&B

## 2020-06-07 PROCEDURE — 94640 AIRWAY INHALATION TREATMENT: CPT

## 2020-06-07 RX ADMIN — LISINOPRIL 40 MG: 20 TABLET ORAL at 20:59

## 2020-06-07 RX ADMIN — FUROSEMIDE 20 MG: 20 TABLET ORAL at 08:43

## 2020-06-07 RX ADMIN — Medication 100 MG: at 11:54

## 2020-06-07 RX ADMIN — SOTALOL HYDROCHLORIDE 120 MG: 120 TABLET ORAL at 20:59

## 2020-06-07 RX ADMIN — Medication 100 MG: at 08:42

## 2020-06-07 RX ADMIN — Medication 400 MG: at 11:54

## 2020-06-07 RX ADMIN — VITAMIN D, TAB 1000IU (100/BT) 2000 UNITS: 25 TAB at 18:06

## 2020-06-07 RX ADMIN — ACETAMINOPHEN 650 MG: 325 TABLET ORAL at 13:06

## 2020-06-07 RX ADMIN — TIOTROPIUM BROMIDE INHALATION SPRAY 2 PUFF: 3.12 SPRAY, METERED RESPIRATORY (INHALATION) at 04:24

## 2020-06-07 RX ADMIN — SOTALOL HYDROCHLORIDE 120 MG: 120 TABLET ORAL at 08:42

## 2020-06-07 RX ADMIN — PANTOPRAZOLE SODIUM 20 MG: 20 TABLET, DELAYED RELEASE ORAL at 06:03

## 2020-06-07 RX ADMIN — HYDROXYCHLOROQUINE SULFATE 100 MG: 200 TABLET, FILM COATED ORAL at 08:42

## 2020-06-07 RX ADMIN — ACETAMINOPHEN 650 MG: 325 TABLET, FILM COATED ORAL at 08:51

## 2020-06-07 RX ADMIN — ACETAMINOPHEN 650 MG: 325 TABLET ORAL at 18:06

## 2020-06-07 RX ADMIN — WARFARIN SODIUM 6 MG: 2 TABLET ORAL at 18:06

## 2020-06-07 RX ADMIN — ACETAMINOPHEN 650 MG: 325 TABLET ORAL at 06:03

## 2020-06-07 RX ADMIN — HYDROXYCHLOROQUINE SULFATE 100 MG: 200 TABLET, FILM COATED ORAL at 20:58

## 2020-06-07 RX ADMIN — CIPROFLOXACIN 500 MG: 500 TABLET, FILM COATED ORAL at 20:58

## 2020-06-07 RX ADMIN — CIPROFLOXACIN 500 MG: 500 TABLET, FILM COATED ORAL at 08:42

## 2020-06-07 RX ADMIN — BUDESONIDE AND FORMOTEROL FUMARATE DIHYDRATE 2 PUFF: 80; 4.5 AEROSOL RESPIRATORY (INHALATION) at 04:24

## 2020-06-07 RX ADMIN — MONTELUKAST 10 MG: 10 TABLET, FILM COATED ORAL at 20:59

## 2020-06-07 RX ADMIN — BUDESONIDE AND FORMOTEROL FUMARATE DIHYDRATE 2 PUFF: 80; 4.5 AEROSOL RESPIRATORY (INHALATION) at 15:35

## 2020-06-07 ASSESSMENT — PAIN SCALES - GENERAL
PAINLEVEL_OUTOF10: 4
PAINLEVEL_OUTOF10: 4
PAINLEVEL_OUTOF10: 2
PAINLEVEL_OUTOF10: 4
PAINLEVEL_OUTOF10: 0

## 2020-06-07 NOTE — PROGRESS NOTES
medications to include Coumadin, Prinivil, Betapace, consult hospitalist for backup medical  4. Tobacco user-stop smoking counseling-add NicoDerm  5. Vitamin D deficiency-add vitamin D treat for osteoporosis  6. Hyponatremia, Hyperkalemia-supplement accordingly recheck BMP-consider holding ACE inhibitor  7. Whole body arthritis and inflammation due to Lupus-monitor for blood clots patient is on Coumadin she is on daily Deltasone for her lungs and lupus as well as Plaquenil  8. Steroid-induced type 2 diabetes-limit steroids to lowest effective dose add carbohydrate restriction of the diet check hemoglobin A1c  9.  Acute UTI-trial Macrobid check postvoid residuals monitor for retention correct blood sugars    Dalia Longo D.O., PM&R     Attending    [address]

## 2020-06-07 NOTE — PLAN OF CARE
Problem: Falls - Risk of:  Goal: Will remain free from falls  Description: Will remain free from falls  6/7/2020 0117 by Chucho Frias RN  Outcome: Ongoing  6/6/2020 1529 by Jose G Cade RN  Outcome: Ongoing  Goal: Absence of physical injury  Description: Absence of physical injury  6/7/2020 0117 by Chucho Frias RN  Outcome: Ongoing  6/6/2020 1529 by Jose G Cade RN  Outcome: Ongoing     Problem: ABCDS Injury Assessment  Goal: Absence of physical injury  6/7/2020 0117 by Chucho Frias RN  Outcome: Ongoing  6/6/2020 1529 by Jose G Cade RN  Outcome: Ongoing     Problem: Pain:  Goal: Pain level will decrease  Description: Pain level will decrease  6/7/2020 0117 by Chucho Frias RN  Outcome: Ongoing  6/6/2020 1529 by Jose G Cade RN  Outcome: Ongoing  Goal: Control of acute pain  Description: Control of acute pain  6/7/2020 0117 by Chucho Frias RN  Outcome: Ongoing  6/6/2020 1529 by Jose G Cade RN  Outcome: Ongoing  Goal: Control of chronic pain  Description: Control of chronic pain  6/7/2020 0117 by Chucho Frias RN  Outcome: Ongoing  6/6/2020 1529 by Jose G Cade RN  Outcome: Ongoing     Problem: Mobility - Impaired:  Goal: Mobility will improve  Description: Mobility will improve  6/7/2020 0117 by Chucho Frias RN  Outcome: Ongoing  6/6/2020 1529 by Jose G Cade RN  Outcome: Ongoing     Problem: Nutrition  Goal: Optimal nutrition therapy  6/7/2020 0117 by Chucho Frias RN  Outcome: Ongoing  6/6/2020 1529 by Jose G Cade RN  Outcome: Ongoing

## 2020-06-08 LAB
INR BLD: 2.1
PROTHROMBIN TIME: 23.6 SEC (ref 12.3–14.9)

## 2020-06-08 PROCEDURE — 94760 N-INVAS EAR/PLS OXIMETRY 1: CPT

## 2020-06-08 PROCEDURE — 6370000000 HC RX 637 (ALT 250 FOR IP): Performed by: PHYSICAL MEDICINE & REHABILITATION

## 2020-06-08 PROCEDURE — 97140 MANUAL THERAPY 1/> REGIONS: CPT

## 2020-06-08 PROCEDURE — 97116 GAIT TRAINING THERAPY: CPT

## 2020-06-08 PROCEDURE — 94640 AIRWAY INHALATION TREATMENT: CPT

## 2020-06-08 PROCEDURE — 97535 SELF CARE MNGMENT TRAINING: CPT

## 2020-06-08 PROCEDURE — 94761 N-INVAS EAR/PLS OXIMETRY MLT: CPT

## 2020-06-08 PROCEDURE — 85610 PROTHROMBIN TIME: CPT

## 2020-06-08 PROCEDURE — 1180000000 HC REHAB R&B

## 2020-06-08 PROCEDURE — 6370000000 HC RX 637 (ALT 250 FOR IP): Performed by: NURSE PRACTITIONER

## 2020-06-08 PROCEDURE — 2700000000 HC OXYGEN THERAPY PER DAY

## 2020-06-08 PROCEDURE — 99233 SBSQ HOSP IP/OBS HIGH 50: CPT | Performed by: PHYSICAL MEDICINE & REHABILITATION

## 2020-06-08 PROCEDURE — 97110 THERAPEUTIC EXERCISES: CPT

## 2020-06-08 PROCEDURE — 36415 COLL VENOUS BLD VENIPUNCTURE: CPT

## 2020-06-08 PROCEDURE — 6370000000 HC RX 637 (ALT 250 FOR IP): Performed by: INTERNAL MEDICINE

## 2020-06-08 PROCEDURE — 97530 THERAPEUTIC ACTIVITIES: CPT

## 2020-06-08 RX ORDER — WARFARIN SODIUM 5 MG/1
5 TABLET ORAL
Status: COMPLETED | OUTPATIENT
Start: 2020-06-08 | End: 2020-06-08

## 2020-06-08 RX ADMIN — MONTELUKAST 10 MG: 10 TABLET, FILM COATED ORAL at 21:35

## 2020-06-08 RX ADMIN — HYDROXYCHLOROQUINE SULFATE 100 MG: 200 TABLET, FILM COATED ORAL at 09:05

## 2020-06-08 RX ADMIN — SOTALOL HYDROCHLORIDE 120 MG: 120 TABLET ORAL at 21:35

## 2020-06-08 RX ADMIN — HYDROXYCHLOROQUINE SULFATE 100 MG: 200 TABLET, FILM COATED ORAL at 21:36

## 2020-06-08 RX ADMIN — WARFARIN SODIUM 5 MG: 5 TABLET ORAL at 17:07

## 2020-06-08 RX ADMIN — FUROSEMIDE 20 MG: 20 TABLET ORAL at 09:05

## 2020-06-08 RX ADMIN — LISINOPRIL 40 MG: 20 TABLET ORAL at 21:35

## 2020-06-08 RX ADMIN — ACETAMINOPHEN 650 MG: 325 TABLET ORAL at 12:00

## 2020-06-08 RX ADMIN — Medication 100 MG: at 12:00

## 2020-06-08 RX ADMIN — Medication 100 MG: at 09:04

## 2020-06-08 RX ADMIN — Medication 400 MG: at 12:00

## 2020-06-08 RX ADMIN — BUDESONIDE AND FORMOTEROL FUMARATE DIHYDRATE 2 PUFF: 80; 4.5 AEROSOL RESPIRATORY (INHALATION) at 05:30

## 2020-06-08 RX ADMIN — BUDESONIDE AND FORMOTEROL FUMARATE DIHYDRATE 2 PUFF: 80; 4.5 AEROSOL RESPIRATORY (INHALATION) at 17:47

## 2020-06-08 RX ADMIN — VITAMIN D, TAB 1000IU (100/BT) 2000 UNITS: 25 TAB at 17:08

## 2020-06-08 RX ADMIN — ACETAMINOPHEN 650 MG: 325 TABLET ORAL at 17:07

## 2020-06-08 RX ADMIN — HYDROCODONE BITARTRATE AND ACETAMINOPHEN 1 TABLET: 5; 325 TABLET ORAL at 21:36

## 2020-06-08 RX ADMIN — CIPROFLOXACIN 500 MG: 500 TABLET, FILM COATED ORAL at 21:35

## 2020-06-08 RX ADMIN — TIOTROPIUM BROMIDE INHALATION SPRAY 2 PUFF: 3.12 SPRAY, METERED RESPIRATORY (INHALATION) at 05:30

## 2020-06-08 RX ADMIN — ACETAMINOPHEN 650 MG: 325 TABLET ORAL at 06:10

## 2020-06-08 RX ADMIN — PANTOPRAZOLE SODIUM 20 MG: 20 TABLET, DELAYED RELEASE ORAL at 06:10

## 2020-06-08 RX ADMIN — CIPROFLOXACIN 500 MG: 500 TABLET, FILM COATED ORAL at 09:05

## 2020-06-08 RX ADMIN — SOTALOL HYDROCHLORIDE 120 MG: 120 TABLET ORAL at 09:05

## 2020-06-08 ASSESSMENT — PAIN DESCRIPTION - FREQUENCY: FREQUENCY: INTERMITTENT

## 2020-06-08 ASSESSMENT — PAIN SCALES - GENERAL
PAINLEVEL_OUTOF10: 4
PAINLEVEL_OUTOF10: 0
PAINLEVEL_OUTOF10: 0
PAINLEVEL_OUTOF10: 3
PAINLEVEL_OUTOF10: 5
PAINLEVEL_OUTOF10: 0
PAINLEVEL_OUTOF10: 5

## 2020-06-08 ASSESSMENT — PAIN DESCRIPTION - LOCATION
LOCATION: HIP
LOCATION: GROIN
LOCATION: GROIN

## 2020-06-08 ASSESSMENT — PAIN DESCRIPTION - ORIENTATION
ORIENTATION: LEFT

## 2020-06-08 ASSESSMENT — PAIN DESCRIPTION - DESCRIPTORS
DESCRIPTORS: ACHING;SORE
DESCRIPTORS: ACHING;SORE

## 2020-06-08 ASSESSMENT — PAIN DESCRIPTION - PAIN TYPE
TYPE: SURGICAL PAIN
TYPE: SURGICAL PAIN

## 2020-06-08 NOTE — PROGRESS NOTES
YTB  Heelslides: supine with slide board 2 x 10  Hip Flexion: x20  Hip Abduction: YTB x 20   Knee Long Arc Quad: x 20 (L LE - 2x10)  Knee Short Arc Quad: x20   Ankle Pumps: x20  Other exercises  Other exercises 1: Hip adduction with ball x 20     ASSESSMENT/COMMENTS:  Body structures, Functions, Activity limitations: Decreased functional mobility ; Decreased balance;Decreased endurance;Decreased strength; Increased pain  Assessment: Pt progressing towards goals, with pt able to [facility] status, completed x4 steps with use of x1 HR and SBQC to prepare pt for return to home setting. Increased ambulation distance to 100ft with pt able to maintain proper SpO2 levels.      PLAN OF CARE/Safety:   Safety Devices  Type of devices: Left in chair;Chair alarm in place;Gait belt      Therapy Time:   Individual   Time In 1030   Time Out 1130   Minutes 60     Minutes:60      Transfer/Bed mobility training:10      Gait trainin      Neuro re education:0     Therapeutic ex:25      Sheri Loev  20 at 11:43 AM

## 2020-06-08 NOTE — PROGRESS NOTES
Physical Therapy Rehab Treatment Note  Facility/Department: [facility]  Room: [facility]/R255-       NAME: Radha Tate  : 1935 (81 y.o.)  MRN: [id number]  CODE STATUS: Full Code    Date of Service: 2020  Chart Reviewed: Yes  Family / Caregiver Present: No    Restrictions:  Restrictions/Precautions: Weight Bearing, Fall Risk  Lower Extremity Weight Bearing Restrictions  Left Lower Extremity Weight Bearing: Partial Weight Bearing  Partial Weight Bearing Percentage Or Pounds: 25%       SUBJECTIVE: Subjective: Pt states her pain has been more today than it has been. Pain Screening  Patient Currently in Pain: Yes  Pre Treatment Pain Screening  Pain at present: 5  Intervention List: Patient able to continue with treatment;Patient declined any intervention    Post Treatment Pain Screening:  Pain Assessment  Pain Assessment: 0-10  Pain Level: 5  Pain Location: Groin  Pain Orientation: Left  Pain Descriptors: Aching; Sore    OBJECTIVE:   Overall Orientation Status: Within Functional Limits       Transfers  Sit to Stand: Modified independent  Stand to sit: Modified independent    Ambulation  Ambulation?: Yes  Ambulation 1  Surface: carpet  Device: Rolling Walker  Other Apparatus: O2(1L)  Assistance: Stand by assistance  Quality of Gait: Slow caence with good ability to maintain WB restrictions. Good safety with No LOB. Distance: 60ftx2           Exercises  Hip Flexion: x20 R; 2x10 L   Hip Abduction: YTB x 20   Knee Long Arc Quad: x20   Ankle Pumps: x20  Other exercises  Other exercises 1: Hip adduction with ball x 20     ASSESSMENT/COMMENTS:  Assessment: Gait didtance limited by fatigue.       PLAN OF CARE/Safety:   Safety Devices  Type of devices: Left in chair;Chair alarm in place;Gait belt    Therapy Time:   Individual   Time In 1400   Time Out 1430   Minutes 30     Minutes:      Transfer/Bed mobility trainin      Gait training:10      Neuro re education:0     Therapeutic ex:20    Lorrie Salgado PTA

## 2020-06-08 NOTE — CARE COORDINATION
Ambulating (06/05/20 1505)  Shower Transfers: Supervision (06/05/20 1505)  Shower Transfers Comments: Patient able to maintain PWB 25% LLE, grab bars (06/05/20 1505)  LTG:  Eating  Assistance Needed: Independent  CARE Score: 6  Discharge Goal: Independent, Oral Hygiene  Assistance Needed: Setup or clean-up assistance  CARE Score: 5  Discharge Goal: Independent, Toileting Hygiene  Assistance Needed: Supervision or touching assistance  CARE Score: 4  Discharge Goal: Independent, Shower/Bathe Self  Assistance Needed: Partial/moderate assistance  CARE Score: 3  Discharge Goal: Independent  Upper Body Dressing  Assistance Needed: Setup or clean-up assistance  CARE Score: 5  Discharge Goal: Independent, Lower Body Dressing  Assistance Needed: Partial/moderate assistance  CARE Score: 3  Discharge Goal: Independent, Putting On/Taking Off Footwear  Assistance Needed: Partial/moderate assistance  CARE Score: 3  Discharge Goal: Independent, Toilet Transfer  Assistance Needed: Supervision or touching assistance  CARE Score: 4  Discharge Goal: Independent  OT Treatment Time: 1.0 hrs      SPEECH THERAPY                 Diet/Swallow:                       LTG:                COGNITION  OT: Cognition Comment: comp - supervision, exp - modified indep, soc int - modified indep,  prob solv - modified indep, mem - modified indep  SP:        RECREATIONAL THERAPY  Attendance to recreational therapy programs:    []  Pet Therapy  [] Music Therapy  [] Art Therapy    [] Recreation Therapy Group [] Support Group           Patient social interaction (mood, participation): good      Patient strengths: motivated    Patients goal:  \"To learn how to get my leg back so I can get up and on my own again\"    Problems/Barriers: WB status        1. Safety:          - Intervention / Plan:    [x]  falls protocol     [x]  PT/OT    []  SP        - Results:         2.  Potential DME needs:         - Intervention / Plan:  [x]  PT/OT     [x]  Assess equipment

## 2020-06-08 NOTE — PLAN OF CARE
Problem: Falls - Risk of:  Goal: Will remain free from falls  Description: Will remain free from falls  6/8/2020 0945 by Chandrakant Paiz RN  Outcome: Ongoing  6/8/2020 0009 by Eduardo Frias RN  Outcome: Ongoing  Goal: Absence of physical injury  Description: Absence of physical injury  6/8/2020 0945 by Chandrakant Paiz RN  Outcome: Ongoing  6/8/2020 0009 by Eduardo Frias RN  Outcome: Ongoing     Problem: ABCDS Injury Assessment  Goal: Absence of physical injury  6/8/2020 0945 by Chandrakant Paiz RN  Outcome: Ongoing  6/8/2020 0009 by Eduardo Frias RN  Outcome: Ongoing     Problem: Pain:  Goal: Pain level will decrease  Description: Pain level will decrease  6/8/2020 0945 by Chandrakant Paiz RN  Outcome: Ongoing  6/8/2020 0009 by Eduadro Frias RN  Outcome: Ongoing  Goal: Control of acute pain  Description: Control of acute pain  6/8/2020 0945 by Chandrakant Paiz RN  Outcome: Ongoing  6/8/2020 0009 by Eduardo Frias RN  Outcome: Ongoing  Goal: Control of chronic pain  Description: Control of chronic pain  6/8/2020 0945 by Chandrakant Paiz RN  Outcome: Ongoing  6/8/2020 0009 by Eduardo Frias RN  Outcome: Ongoing     Problem: Mobility - Impaired:  Goal: Mobility will improve  Description: Mobility will improve  6/8/2020 0945 by Chandrakant Paiz RN  Outcome: Ongoing  6/8/2020 0009 by Eduardo Frias RN  Outcome: Ongoing     Problem: Nutrition  Goal: Optimal nutrition therapy  6/8/2020 0945 by Chandrakant Paiz RN  Outcome: Ongoing  6/8/2020 0009 by Eduardo Frias RN  Outcome: Ongoing

## 2020-06-08 NOTE — PROGRESS NOTES
Subjective: The patient complains of severe  acute left hip fracture partially relieved by rest, ice, opiate medication list so relieved by plain Tylenol and exacerbated by weightbearing. I am concerned about patients bleeding risk on Coumadin. I reviewed patient care with previous and current nurses, they confirmed and reported, \"Patient has slept at long intervals with CPAP. Up to [facility] x2. Denies pain. Using ice to hip. \"    I am preparing for discharge in the middle of this week. Patient will need to transition to the Coumadin clinic as an outpatient and her INR is now therapeutic. ROS x10: The patient also complains of severely impaired mobility and activities of daily living. Otherwise no new problems with vision, hearing, nose, mouth, throat, dermal, cardiovascular, GI, , pulmonary, musculoskeletal, psychiatric or neurological. See Rehab H&P on Rehab chart dated . Vital signs:  /63   Pulse 69   Temp 97 °F (36.1 °C) (Oral)   Resp 17   Ht 5' 5\" (1.651 m)   Wt 164 lb (74.4 kg)   LMP  (LMP Unknown)   SpO2 96%   BMI 27.29 kg/m²   I/O:   PO/Intake:  fair PO intake, no problems observed or reported. Bowel/Bladder:  continent, frequent nocturnal urination-UTI pure wick catheter at night  General:  Patient is well developed, adequately nourished, non-obese and     well kempt. HEENT:    PERRLA, hearing intact to loud voice, external inspection of ear     and nose benign. Inspection of lips, tongue and gums benign  Musculoskeletal: No significant change in strength or tone. All joints stable. Inspection and palpation of digits and nails show no clubbing,       cyanosis or inflammatory conditions. Neuro/Psychiatric: Affect: flat but pleasant. Alert and oriented to person, place and     situation. No significant change in deep tendon reflexes or     sensation  Lungs:  Diminished, CTA-B. Respiration effort is normal at rest.     Heart:   S1 = S2, irregular.   No loud murmurs. Abdomen:  Soft, non-tender, no enlargement of liver or spleen. Extremities:  No significant lower extremity edema or tenderness. Skin:   Intact to general survey, left hip incision healing    Rehabilitation:  Physical therapy: FIMS:  Bed Mobility:      Transfers: Sit to Stand: Stand by assistance  Stand to sit: Stand by assistance  Bed to Chair: Minimal assistance, Contact guard assistance, Ambulation 1  Surface: carpet, level tile  Device: Rolling Walker  Other Apparatus: O2(1 L O2 )  Assistance: Contact guard assistance  Quality of Gait: demonstrates good ability to maintain Wbing restrictions. no LOB, mild fatigue/ dyspnea observed post ambulation   Gait Deviations: Slow Marivel, Decreased step length  Distance:    Comments: SpO2 of 96% following ambualtion today while on 1 L of O2 , Stairs  # Steps : 1  Stairs Height: 6\"  Rails: Bilateral  Assistance: Contact guard assistance, Minimal assistance  Comment: Performed 6\" x 5 step ups on stair with bilateral support to faciliate proper stair climbing technique and to gain strength for climbing stairs. Occassional VC to maintain weightbearing status. FIMS:  ,  , Assessment: Pt remains in good spirt and willing to work through fatigue and generalized muscle soreness. Pt shows continued progress with ambualtion and transfers, ROM of L LE remains limited due to c/o pain. Occupational therapy: FIMS:   ,  ,      Speech therapy: FIMS:        Lab/X-ray studies reviewed, analyzed and discussed with patient and staff:   Recent Results (from the past 24 hour(s))   Protime-INR    Collection Time: 06/08/20  5:49 AM   Result Value Ref Range    Protime 23.6 (H) 12.3 - 14.9 sec    INR 2.1        Xr Chest  : 5/26/2020  NO ACUTE ACTIVE CARDIOPULMONARY PROCESS. RADIOGRAPHIC FINDINGS OF COPD    Xr Chest : 5/26/2020  No acute radiographic abnormality or significant interval change.  Emphysema    Xr Femur Left  : 5/26/2020   Left femur complex intertrochanteric fracture. Ct Head  : 5/25/2020    An unenhanced CT scan of the brain demonstrates no evidence of a skull fracture. There is cerebral atrophy and age related findings in the brain. There is no acute CVA. There is no acute intra-axial or extra-axial findings in the brain. There is no hydrocephalus, intracranial mass, hemorrhage, \"mass effect\" or midline shift. The remainder of the CT scan of the brain appears unremarkable. 1. CEREBRAL ATROPHY AND AGE RELATED FINDINGS IN THE BRAIN. 2. NO ACUTE INTRA-AXIAL OR EXTRA-AXIAL FINDINGS IN THE BRAIN. Ct Cervical Spine : 5/25/2020   1. DEGENERATIVE AND ARTHRITIC CHANGES THROUGHOUT THE C-SPINE AS DESCRIBED. 2.  NO ACUTE FRACTURE, SUBLUXATION OR DISLOCATION INVOLVING THE CERVICAL SPINE. Xr Chest   5/28/2020   FINDINGS: Single  views of the chest is submitted. Left-sided CCD device. Leads overlying the cardiac silhouette. Unchanged The cardiac silhouette is of normal size configuration. . Pulmonary vascular unremarkable. Right sided trachea. No focal infiltrates. No Pneumothoraces. Old healed right humeral head fracture                                                                                   NO ACUTE ACTIVE CARDIOPULMONARY PROCESS    Fluoro For Surgical  5/27/2020  EXAMINATION:  FLUORO FOR SURGICAL PROCEDURES CLINICAL HISTORY:  ORIF Left Femur COMPARISONS:  Left femur radiographs 5/26/2020 TECHNIQUE: Intraoperative fluoroscopy. Fluoroscopic time 34.9 seconds. 9 screen capture images. FINDINGS:  Submitted screen capture images document left femoral TFN fixation in near-anatomic alignment. See operative report for correlation.      FLUOROSCOPIC INTRAOPERATIVE IMAGING ASSISTANCE FOR LEFT HIP TFN ORIF     Xr Hip 2-3 Vw W Pelvis Left : 5/26/2020    Acute complex left proximal femur intertrochanteric fracture, with fracture line extending to involve both the greater and lesser trochanters, with multiple small fracture fragments at the fracture sites and displacement measuring up to 15 mm. Alignment of left hip appears maintained. Degenerative changes lower lumbar spine and SI joints. Right hip unremarkable. No other fracture in the distal femur on the dedicated left femur radiographs. Alignment is grossly maintained. No other significant abnormality. Left femur complex intertrochanteric fracture. Previous extensive, complex labs, notes and diagnostics reviewed and analyzed. ALLERGIES:    Allergies as of 05/29/2020 - Review Complete 05/29/2020   Allergen Reaction Noted    Lipitor [atorvastatin calcium]  05/25/2020      (please also verify by checking MAR)     Today I evaluated this patient for periodic reassessment of medical and functional status. The patient was discussed in detail at the treatment team meeting focusing on current medical issues, progress in therapies, social issues, psychological issues, barriers to progress and strategies to address these barriers, and discharge planning. See the addendum to rehab progress note-as a second progress note in the chart. The patient continues to be high risk for future disability and their medical and rehabilitation prognosis continue to be good and therefore, we will continue the patient's rehabilitation course as planned. The patient's tentative discharge date was set. Patient and family education was discussed. The patient was made aware of the team discussion regarding their progress. Complex Physical Medicine & Rehab Issues Assess & Plan:   1. Severe abnormality of gait and mobility and impaired self-care and ADL's secondary to acute left intratrochanteric fracture progressive osteoporosis and falling.   Functional and medical status reassessed regarding patients ability to participate in therapies and patient found to be able to participate in acute intensive comprehensive inpatient rehabilitation program including PT/OT to improve balance, ambulation, ADLs, and to improve the General Medical Issues that complicate care Assess & Plan:    1. Closed intertrochanteric fracture of hip, left-status post intramedullary nailing fracture was due to a fall follow-up with orthopedics weightbearing per orthopedics  2. Chronic obstructive pulmonary disease,   Respiratory failure, Obstructive sleep apnea (adult) with noncompliance at least in the hospital-pulse ox checks to shift encourage use of CPAP prescribed weight loss add aerosol treatments daily Deltasone for her lungs and lupus. Nasal cannula O2 and elevate head of bed with Afrin nasal spray at night if patient is noncompliant with the CPAP. 3.   Hyperlipidemia,   Atrial fibrillation, Coronary atherosclerosis, Essential hypertension,   Peripheral vascular disease, Sick sinus syndrome-vital signs every shift dose and titrate cardiac medications to include Coumadin, Prinivil, Betapace, consult hospitalist for backup medical  4. Tobacco user-stop smoking counseling-add NicoDerm  5. Vitamin D deficiency-add vitamin D treat for osteoporosis  6. Hyponatremia, Hyperkalemia-supplement accordingly recheck BMP-consider holding ACE inhibitor  7. Whole body arthritis and inflammation due to Lupus-monitor for blood clots patient is on Coumadin she is on daily Deltasone for her lungs and lupus as well as Plaquenil  8. Steroid-induced type 2 diabetes-limit steroids to lowest effective dose add carbohydrate restriction of the diet check hemoglobin A1c  9.  Acute UTI-trial Macrobid check postvoid residuals monitor for retention correct blood sugars        Janice Bullard D.O., PM&R     Attending    [address]

## 2020-06-08 NOTE — PROGRESS NOTES
and where they could be purchased. Therapist provided patient with written information about ramps that could be found at [employer] and purchased in various lengths. Patient satisfied. ADL    Equipment Provided: Long-handled shoe horn    LE Dressing: Other (Patient doffed B socks with increased time and MOD difficulty. Patient donned B shoes with increased time at setup with long handled shoe horn.)    Upper Extremity Function  UE Strengthing: Patient engaged in therapeutic activity to increase B FM coordination, B UE ROM/strengthening, new learning and problem solving for ADL's, IADL's and transfers. Patient donned B 1 # wrist weights. Patient completed Tetris activity following verbal instructions and demonstration. Patient with 0 FM difficulty picking up pieces from tabletop. Patient with 0 difficulty reaching in forward planes. Patient with 0 difficulty reaching in lateral planes. Patient with 0 errors sorting 80 pieces by shape. Patient with MIN difficulty reaching in vertical planes to place pieces in vertical board. Patient with 0 difficulty with in hand manipulation turning pieces into proper position for placement into board. Patient required MIN verbal cues throughout activity. Patient able to fill 20 X 10 vertical Tetris board with various shaped Tetris pieces leaving 4 of 200 spaces empty. Patient donned B 1 # wrist wts, able to reach to L lateral side with 0 difficulty to retrieve lg blocks 1 at a time. Patient able to reach in vertical planes with MIN difficulty to place 60 lg blocks on vertical dowel rods of varying heights 1 at a time following colored pattern. Patient noted to have 4 errors following colored pattern. Patient required verbal cues to identify errors. Patient with 0 difficulty reaching in L lateral plane to place large blocks in container. Patient alternated UE's as needed for RB's and had RB's as needed.           Plan   Plan  Times per week: 5-7 times per week  Plan weeks: 2 weeks  Current Treatment Recommendations: Strengthening, Functional Mobility Training, Endurance Training, Balance Training, Neuromuscular Re-education, Self-Care / ADL, Equipment Evaluation, Education, & procurement, Safety Education & Training, Patient/Caregiver Education & Training    Plan Comment: Continue per OT POC for planned d/c on 6-10-20         Goals  Patient Goals   Patient goals :  \"To learn how to get my leg back so I can get up and on my own again\"       Therapy Time   Individual Concurrent Group Co-treatment   Time In 1300         Time Out 1400         Minutes 60             ADL training: 10 minutes  Therapeutic activities: 50 minutes       Electronically signed by FAROOQ Padilla on 6/8/20 at 3:24 PM EDT      FAROOQ Padilla

## 2020-06-09 LAB
INR BLD: 2.3
PROTHROMBIN TIME: 25.2 SEC (ref 12.3–14.9)

## 2020-06-09 PROCEDURE — 97116 GAIT TRAINING THERAPY: CPT

## 2020-06-09 PROCEDURE — 6370000000 HC RX 637 (ALT 250 FOR IP): Performed by: NURSE PRACTITIONER

## 2020-06-09 PROCEDURE — 6370000000 HC RX 637 (ALT 250 FOR IP): Performed by: PHYSICAL MEDICINE & REHABILITATION

## 2020-06-09 PROCEDURE — 2700000000 HC OXYGEN THERAPY PER DAY

## 2020-06-09 PROCEDURE — 6370000000 HC RX 637 (ALT 250 FOR IP): Performed by: INTERNAL MEDICINE

## 2020-06-09 PROCEDURE — 94640 AIRWAY INHALATION TREATMENT: CPT

## 2020-06-09 PROCEDURE — 97535 SELF CARE MNGMENT TRAINING: CPT

## 2020-06-09 PROCEDURE — 94761 N-INVAS EAR/PLS OXIMETRY MLT: CPT

## 2020-06-09 PROCEDURE — 36415 COLL VENOUS BLD VENIPUNCTURE: CPT

## 2020-06-09 PROCEDURE — 97530 THERAPEUTIC ACTIVITIES: CPT

## 2020-06-09 PROCEDURE — 97110 THERAPEUTIC EXERCISES: CPT

## 2020-06-09 PROCEDURE — 99232 SBSQ HOSP IP/OBS MODERATE 35: CPT | Performed by: PHYSICAL MEDICINE & REHABILITATION

## 2020-06-09 PROCEDURE — 1180000000 HC REHAB R&B

## 2020-06-09 PROCEDURE — 85610 PROTHROMBIN TIME: CPT

## 2020-06-09 RX ADMIN — PANTOPRAZOLE SODIUM 20 MG: 20 TABLET, DELAYED RELEASE ORAL at 06:51

## 2020-06-09 RX ADMIN — HYDROXYCHLOROQUINE SULFATE 100 MG: 200 TABLET, FILM COATED ORAL at 08:17

## 2020-06-09 RX ADMIN — Medication 100 MG: at 13:10

## 2020-06-09 RX ADMIN — CIPROFLOXACIN 500 MG: 500 TABLET, FILM COATED ORAL at 08:18

## 2020-06-09 RX ADMIN — HYDROXYCHLOROQUINE SULFATE 100 MG: 200 TABLET, FILM COATED ORAL at 20:46

## 2020-06-09 RX ADMIN — BUDESONIDE AND FORMOTEROL FUMARATE DIHYDRATE 2 PUFF: 80; 4.5 AEROSOL RESPIRATORY (INHALATION) at 16:01

## 2020-06-09 RX ADMIN — FUROSEMIDE 20 MG: 20 TABLET ORAL at 08:17

## 2020-06-09 RX ADMIN — ACETAMINOPHEN 650 MG: 325 TABLET ORAL at 17:55

## 2020-06-09 RX ADMIN — TIOTROPIUM BROMIDE INHALATION SPRAY 2 PUFF: 3.12 SPRAY, METERED RESPIRATORY (INHALATION) at 04:16

## 2020-06-09 RX ADMIN — ACETAMINOPHEN 650 MG: 325 TABLET ORAL at 06:51

## 2020-06-09 RX ADMIN — Medication 100 MG: at 08:17

## 2020-06-09 RX ADMIN — CIPROFLOXACIN 500 MG: 500 TABLET, FILM COATED ORAL at 20:45

## 2020-06-09 RX ADMIN — LISINOPRIL 40 MG: 20 TABLET ORAL at 20:46

## 2020-06-09 RX ADMIN — SOTALOL HYDROCHLORIDE 120 MG: 120 TABLET ORAL at 08:17

## 2020-06-09 RX ADMIN — BUDESONIDE AND FORMOTEROL FUMARATE DIHYDRATE 2 PUFF: 80; 4.5 AEROSOL RESPIRATORY (INHALATION) at 04:16

## 2020-06-09 RX ADMIN — SOTALOL HYDROCHLORIDE 120 MG: 120 TABLET ORAL at 20:46

## 2020-06-09 RX ADMIN — WARFARIN SODIUM 6 MG: 2 TABLET ORAL at 17:55

## 2020-06-09 RX ADMIN — VITAMIN D, TAB 1000IU (100/BT) 2000 UNITS: 25 TAB at 16:04

## 2020-06-09 RX ADMIN — Medication 400 MG: at 13:10

## 2020-06-09 RX ADMIN — MONTELUKAST 10 MG: 10 TABLET, FILM COATED ORAL at 20:46

## 2020-06-09 RX ADMIN — ACETAMINOPHEN 650 MG: 325 TABLET ORAL at 13:11

## 2020-06-09 ASSESSMENT — PAIN SCALES - GENERAL
PAINLEVEL_OUTOF10: 4
PAINLEVEL_OUTOF10: 4
PAINLEVEL_OUTOF10: 3
PAINLEVEL_OUTOF10: 0
PAINLEVEL_OUTOF10: 3
PAINLEVEL_OUTOF10: 3

## 2020-06-09 ASSESSMENT — PAIN DESCRIPTION - LOCATION: LOCATION: HIP

## 2020-06-09 ASSESSMENT — PAIN DESCRIPTION - DESCRIPTORS: DESCRIPTORS: ACHING

## 2020-06-09 ASSESSMENT — PAIN DESCRIPTION - ORIENTATION: ORIENTATION: LEFT

## 2020-06-09 ASSESSMENT — PAIN DESCRIPTION - PAIN TYPE: TYPE: SURGICAL PAIN

## 2020-06-09 NOTE — PROGRESS NOTES
Physical Therapy Rehab Treatment Note  [place] [facility]  [email or web address]       NAME: Nelda Vaz  : 1935 (81 y.o.)  MRN: [id number]  CODE STATUS: Full Code    Date of Service: 2020  Chart Reviewed: Yes  Family / Caregiver Present: No    Restrictions:  Restrictions/Precautions: Weight Bearing, Fall Risk  Lower Extremity Weight Bearing Restrictions  Left Lower Extremity Weight Bearing: Partial Weight Bearing  Partial Weight Bearing Percentage Or Pounds: 25%       SUBJECTIVE:    Pain Screening  Patient Currently in Pain: Yes       Post Treatment Pain Screening:3/10  Pain Assessment  Pain Level: 3    OBJECTIVE:                    Bed mobility  Supine to Sit: Minimal assistance  Sit to Supine: Minimal assistance    Transfers  Sit to Stand: Modified independent  Stand to sit: Modified independent  Car Transfer: Supervision    Ambulation  Ambulation?: Yes  Ambulation 1  Surface: carpet;level tile  Device: Rolling Walker  Other Apparatus: O2  Assistance: Modified Independent  Quality of Gait: Slow caence with good ability to maintain WB restrictions. Good safety with No LOB. Gait Deviations: Slow Marivel;Decreased step length  Distance: 50ft, 50ft (ramp)      Stairs/Curb  Stairs?: Yes  Stairs: 1  # Steps : 4  Stairs Height: 6\"  Rails: Right ascending  Curbs: 4\"  Device: Small Johnny Vanesa  Assistance: Stand by assistance           Activity Tolerance  Activity Tolerance: Patient Tolerated treatment well  Activity Tolerance: Pt reported being short of breath without O2 for first half hour. Put on 1L of O2 for second half hour. Exercises  Hamstring Sets: seated HS curls x 20 YTB  Gluteal Sets: x20  Hip Flexion: x20;    Hip Abduction: YTB x 20   Knee Long Arc Quad: x20  Ankle Pumps: x20   Neurodevelopmental Techniques: Tandem Balance 2 x 1 min  Comments: standing left knee flexion x 10   Other exercises  Other exercises 1: Hip adduction with ball x 20  Other exercises 3: dorsiflexion w YTB x 20      ASSESSMENT/COMMENTS:  Body structures, Functions, Activity limitations: Decreased functional mobility ; Decreased balance;Decreased endurance;Decreased strength; Increased pain  Assessment: Pt performed well on ramp ambulation showing control descending. Pt needed VC on technique for getting in and out of bed. PLAN OF CARE/Safety:   Safety Devices  Type of devices: Call light within reach; Chair alarm in place;Gait belt      Therapy Time:   Individual   Time In 1430   Time Out 1530   Minutes 60     Minutes:60      Transfer/Bed mobility trainin      Gait trainin      Neuro re education:0     Therapeutic ex:Gloria Reyna, 20 at 4:16 PM    This therapy session was supervised by Alina Pagan, PTA.

## 2020-06-09 NOTE — PLAN OF CARE
Problem: Falls - Risk of:  Goal: Will remain free from falls  Description: Will remain free from falls  Outcome: Ongoing  Goal: Absence of physical injury  Description: Absence of physical injury  Outcome: Ongoing     Problem: Pain:  Goal: Pain level will decrease  Description: Pain level will decrease  Outcome: Ongoing  Goal: Control of acute pain  Description: Control of acute pain  Outcome: Ongoing  Goal: Control of chronic pain  Description: Control of chronic pain  Outcome: Ongoing     Problem: Mobility - Impaired:  Goal: Mobility will improve  Description: Mobility will improve  Outcome: Ongoing

## 2020-06-09 NOTE — PROGRESS NOTES
Additional work up or/and treatment plan may be added today or then after based on clinical progression. I am managing a portion of pt care. Some medical issues are handled by other specialists. Additional work up and treatment should be done in out pt setting by pt PCP and other out pt providers. In addition to examining and evaluating pt, I spent additional time explaining care, normal and abnormal findings, and treatment plan. All of pt questions were answered. Counseling, diet and education were  provided. Case will be discussed with nursing staff when appropriate. Family will be updated if and when appropriate.       Diet: Dietary Nutrition Supplements: Diabetic Oral Supplement  DIET GENERAL; Carb Control: 3 carb choices (45 gms)/meal    Code Status: Full Code    PT/OT Eval           Electronically signed by RON Downing CNP on 6/9/2020 at 10:35 AM

## 2020-06-09 NOTE — CARE COORDINATION
[address] REHABILITATION  INDEPENDENT IN ROOM NOTE  Room: R255/R255-01  Admit Date: 2020       Date: 2020  Patient Name: Kai Dawson        MRN: [id number]    : 1935  (81 y.o.)  Gender: female      Diagnosis: Impaired mobility secondary to L intertrochanteric fx s/p intramedullary nailing         Discipline pre admission summary:    Nursing:  Patient orientation to the room/suite:  [] Yes    []   No  (Safety checks to consider: Oxygen, Fire Alarm, Stove safety, Call alarm, Bed alarm,   Bed power, TV, Phone)        1. Pt physical ability to be independent in room/suite:  [] Yes    []   No   Comment:    2. Pt demonstrates cognitive ability to be independent in room/suite:  [] Yes    []   No   Comment:    3. Pt demonstrates emotional ability to be independent in room/suite:  [] Yes    []   No   Comment:    4. Special Considerations/Equipment if needed: [] NA   Comment:    Recommend independent in room/suite:  [] Yes    []   No            Signature: ***      Occupational Therapy:  1. Pt physical ability to be independent in room/suite:  [x] Yes    []   No   Comment:     2. Pt demonstrates cognitive ability to be independent in room/suite:  [x] Yes    []   No   Comment:    3. Pt demonstrates emotional ability to be independent in room/suite:  [x] Yes    []   No   Comment:    4. Special Considerations/Equipment if needed: [] NA   Comment:    Recommend independent in room/suite:  [x] Yes    []   No       Signature: Electronically signed by FAROOQ Dwyer on 20 at 9:53 AM EDT        Physical Therapy:  1. Pt physical ability to be independent in room/suite:  [] Yes    []   No   Comment:     2. Pt demonstrates cognitive ability to be independent in room/suite:  [] Yes    []   No   Comment:    3. Pt demonstrates emotional ability to be independent in room/suite:  [] Yes    []   No   Comment:    4.  Special Considerations/Equipment if needed: []

## 2020-06-09 NOTE — PROGRESS NOTES
was issue a strengthening HEP and completed it with the written handout and occasional verbal cues. Plan   Plan  Times per week: 5-7 times per week  Plan weeks: 2 weeks  Current Treatment Recommendations: Strengthening, Functional Mobility Training, Endurance Training, Balance Training, Neuromuscular Re-education, Self-Care / ADL, Equipment Evaluation, Education, & procurement, Safety Education & Training, Patient/Caregiver Education & Training  Plan Comment: Continue per OT POC for planned d/c on 6-10-20    Goals  Patient Goals   Patient goals :  \"To learn how to get my leg back so I can get up and on my own again\"       Therapy Time   Individual Concurrent Group Co-treatment   Time In 1530         Time Out 1555         Minutes 25              ADL training: 10 minutes  Therapeutic activities: 15 minutes    KATHIE Potts/RAY    Electronically signed by KATHIE Potts/RAY on 6/9/2020 at 3:57 PM

## 2020-06-09 NOTE — PROGRESS NOTES
Subjective: The patient complains of severe  acute left hip fracture partially relieved by rest, ice, opiate medication list so relieved by plain Tylenol and exacerbated by weightbearing. I am concerned about patients bleeding risk on Coumadin. I am concerned about her acute UTI seemingly improving with Macrobid. Cookie viewed nursing care notes including that from the LPN,  \"Pt complains of 3/10 pain to left hip. She had tylenol at 0700 this AM. She ate 100% of her breakfast. Electronically signed by Anastasia Carter LPN on 5/9/2656 at 9:54 AM\" we will continue to titrate her pain medications using lowest effective dose she is currently on Norco.    ROS x10: The patient also complains of severely impaired mobility and activities of daily living. Otherwise no new problems with vision, hearing, nose, mouth, throat, dermal, cardiovascular, GI, , pulmonary, musculoskeletal, psychiatric or neurological. See Rehab H&P on Rehab chart dated . Vital signs:  /73   Pulse 68   Temp 96.2 °F (35.7 °C) (Oral)   Resp 18   Ht 5' 5\" (1.651 m)   Wt 164 lb (74.4 kg)   LMP  (LMP Unknown)   SpO2 95%   BMI 27.29 kg/m²   I/O:   PO/Intake:  fair PO intake, no problems observed or reported. Bowel/Bladder:  continent, frequent nocturnal urination-UTI pure wick catheter at night Macrobid until culture results available  General:  Patient is well developed, adequately nourished, non-obese and     well kempt. HEENT:    PERRLA, hearing intact to loud voice, external inspection of ear     and nose benign. Inspection of lips, tongue and gums benign  Musculoskeletal: No significant change in strength or tone. All joints stable. Inspection and palpation of digits and nails show no clubbing,       cyanosis or inflammatory conditions. Neuro/Psychiatric: Affect: flat but pleasant. Alert and oriented to person, place and     situation.   No significant change in deep tendon reflexes or     sensation  Lungs:  Diminished, CTA-B. Respiration effort is normal at rest.     Heart:   S1 = S2, irregular. No loud murmurs. Abdomen:  Soft, non-tender, no enlargement of liver or spleen. Extremities:  No significant lower extremity edema or tenderness. Skin:   Intact to general survey, left hip incision healing    Rehabilitation:  Physical therapy: FIMS:  Bed Mobility:      Transfers: Sit to Stand: Modified independent  Stand to sit: Modified independent  Bed to Chair: Minimal assistance, Contact guard assistance, Ambulation 1  Surface: carpet  Device: Rolling Walker  Other Apparatus: O2(1L)  Assistance: Stand by assistance  Quality of Gait: Slow caence with good ability to maintain WB restrictions. Good safety with No LOB. Gait Deviations: Slow Marivel, Decreased step length  Distance: 60ftx2  Comments: SpO2 97% following ambualtion while on 1L of O2 , Stairs  # Steps : 4  Stairs Height: 6\"  Rails: Right ascending(left sbqc)  Device: Small Johnny Vanesa  Assistance: Minimal assistance, Contact guard assistance  Comment: vc for correction of sequencing. pt brought right leg down initially with steps. FIMS:  ,  , Assessment: Gait didtance limited by fatigue. Occupational therapy: FIMS:   ,  ,      Speech therapy: FIMS:        Lab/X-ray studies reviewed, analyzed and discussed with patient and staff:   Recent Results (from the past 24 hour(s))   Protime-INR    Collection Time: 06/09/20  5:05 AM   Result Value Ref Range    Protime 25.2 (H) 12.3 - 14.9 sec    INR 2.3        Xr Chest  : 5/26/2020  NO ACUTE ACTIVE CARDIOPULMONARY PROCESS. RADIOGRAPHIC FINDINGS OF COPD    Xr Chest : 5/26/2020  No acute radiographic abnormality or significant interval change. Emphysema    Xr Femur Left  : 5/26/2020   Left femur complex intertrochanteric fracture. Ct Head  : 5/25/2020    An unenhanced CT scan of the brain demonstrates no evidence of a skull fracture.   There is cerebral atrophy and age related other fracture in the distal femur on the dedicated left femur radiographs. Alignment is grossly maintained. No other significant abnormality. Left femur complex intertrochanteric fracture. Previous extensive, complex labs, notes and diagnostics reviewed and analyzed. ALLERGIES:    Allergies as of 05/29/2020 - Review Complete 05/29/2020   Allergen Reaction Noted    Lipitor [atorvastatin calcium]  05/25/2020      (please also verify by checking MAR)       Yesterday I evaluated this patient for periodic reassessment of medical and functional status. The patient was discussed in detail at the treatment team meeting focusing on current medical issues, progress in therapies, social issues, psychological issues, barriers to progress and strategies to address these barriers, and discharge planning. See the hand written addendum to rehab progress note. The patient continues to be high risk for future disability and their medical and rehabilitation prognosis continue to be good and therefore, we will continue the patient's rehabilitation course as planned. The patient's tentative discharge date was set. Patient and family education was discussed. The patient was made aware of the team discussion regarding their progress. Discharge plans were discussed along with barriers to progress and strategies to address these barriers, patient encouraged to continue to discuss discharge plans with . Complex Physical Medicine & Rehab Issues Assess & Plan:   1. Severe abnormality of gait and mobility and impaired self-care and ADL's secondary to acute left intratrochanteric fracture progressive osteoporosis and falling.   Functional and medical status reassessed regarding patients ability to participate in therapies and patient found to be able to participate in acute intensive comprehensive inpatient rehabilitation program including PT/OT to improve balance, ambulation, ADLs, and to improve the

## 2020-06-09 NOTE — PROGRESS NOTES
Occupational Therapy  Facility/Department: [facility]  Daily Treatment Note  NAME: Navjot Stroud  : 1935  MRN: [id number]    Date of Service: 2020    Discharge Recommendations:  Continue to assess pending progress       Assessment      Activity Tolerance  Activity Tolerance: Patient Tolerated treatment well  Activity Tolerance: 1L O2 nasal canula  Safety Devices  Safety Devices in place: Yes  Type of devices: All fall risk precautions in place         Patient Diagnosis(es): There were no encounter diagnoses. has a past medical history of COPD (chronic obstructive pulmonary disease) ([address] Hypertension, and Lupus ([address] has a past surgical history that includes Cholecystectomy; Appendectomy; Elbow surgery (Left); Cardiac pacemaker placement; and Femur fracture surgery (Left, 2020). Restrictions  Restrictions/Precautions  Restrictions/Precautions: Weight Bearing, Fall Risk  Implants present? : Pacemaker  Lower Extremity Weight Bearing Restrictions  Left Lower Extremity Weight Bearing: Partial Weight Bearing  Partial Weight Bearing Percentage Or Pounds: 25%     Subjective   General  Chart Reviewed: Yes  Patient assessed for rehabilitation services?: Yes  Response to previous treatment: Patient with no complaints from previous session  Family / Caregiver Present: No  Referring Practitioner: Dr Espino Standard  Diagnosis: Imp Mob 2° intertrochanteric fracture S/P IM nailing    Subjective  Subjective: I am ready. Pain Assessment  Pain Level: 3  Pain Type: Surgical pain  Pain Location: Hip  Pain Orientation: Left  Pain Descriptors: Aching  Pre Treatment Pain Screening  Pain at present: 2  Intervention List: Patient able to continue with treatment;Patient declined any intervention  Comments / Details: Patient provided with pain patches from Dimitris Esqueda N during treatment session.      Orientation  Orientation  Overall Orientation Status: Within Functional Limits     Objective      ADL    Equipment

## 2020-06-10 VITALS
BODY MASS INDEX: 27.32 KG/M2 | DIASTOLIC BLOOD PRESSURE: 67 MMHG | HEART RATE: 70 BPM | HEIGHT: 65 IN | WEIGHT: 164 LBS | RESPIRATION RATE: 17 BRPM | OXYGEN SATURATION: 93 % | TEMPERATURE: 98 F | SYSTOLIC BLOOD PRESSURE: 126 MMHG

## 2020-06-10 LAB
INR BLD: 2.6
PROTHROMBIN TIME: 28 SEC (ref 12.3–14.9)

## 2020-06-10 PROCEDURE — 6370000000 HC RX 637 (ALT 250 FOR IP): Performed by: PHYSICAL MEDICINE & REHABILITATION

## 2020-06-10 PROCEDURE — 99239 HOSP IP/OBS DSCHRG MGMT >30: CPT | Performed by: PHYSICAL MEDICINE & REHABILITATION

## 2020-06-10 PROCEDURE — 2700000000 HC OXYGEN THERAPY PER DAY

## 2020-06-10 PROCEDURE — 97535 SELF CARE MNGMENT TRAINING: CPT

## 2020-06-10 PROCEDURE — 36415 COLL VENOUS BLD VENIPUNCTURE: CPT

## 2020-06-10 PROCEDURE — 85610 PROTHROMBIN TIME: CPT

## 2020-06-10 PROCEDURE — 6370000000 HC RX 637 (ALT 250 FOR IP): Performed by: INTERNAL MEDICINE

## 2020-06-10 PROCEDURE — 6370000000 HC RX 637 (ALT 250 FOR IP): Performed by: NURSE PRACTITIONER

## 2020-06-10 PROCEDURE — 94761 N-INVAS EAR/PLS OXIMETRY MLT: CPT

## 2020-06-10 PROCEDURE — 97116 GAIT TRAINING THERAPY: CPT

## 2020-06-10 PROCEDURE — 97110 THERAPEUTIC EXERCISES: CPT

## 2020-06-10 PROCEDURE — 94640 AIRWAY INHALATION TREATMENT: CPT

## 2020-06-10 RX ORDER — LISINOPRIL 40 MG/1
40 TABLET ORAL NIGHTLY
Qty: 30 TABLET | Refills: 3 | Status: SHIPPED | OUTPATIENT
Start: 2020-06-10

## 2020-06-10 RX ORDER — METHOCARBAMOL 500 MG/1
500 TABLET, FILM COATED ORAL 4 TIMES DAILY PRN
Qty: 60 TABLET | Refills: 0 | Status: SHIPPED | OUTPATIENT
Start: 2020-06-10 | End: 2020-06-20

## 2020-06-10 RX ORDER — HYDROCODONE BITARTRATE AND ACETAMINOPHEN 5; 325 MG/1; MG/1
0.5 TABLET ORAL EVERY 6 HOURS PRN
Qty: 20 TABLET | Refills: 0 | Status: SHIPPED | OUTPATIENT
Start: 2020-06-10 | End: 2020-06-17

## 2020-06-10 RX ORDER — WARFARIN SODIUM 4 MG/1
4 TABLET ORAL
Qty: 30 TABLET | Refills: 3 | Status: SHIPPED | OUTPATIENT
Start: 2020-06-10 | End: 2021-01-18

## 2020-06-10 RX ORDER — WARFARIN SODIUM 4 MG/1
4 TABLET ORAL
Status: COMPLETED | OUTPATIENT
Start: 2020-06-10 | End: 2020-06-10

## 2020-06-10 RX ORDER — FUROSEMIDE 20 MG/1
20 TABLET ORAL DAILY
Qty: 60 TABLET | Refills: 3 | Status: SHIPPED | OUTPATIENT
Start: 2020-06-11

## 2020-06-10 RX ORDER — HYDROCODONE BITARTRATE AND ACETAMINOPHEN 5; 325 MG/1; MG/1
0.5 TABLET ORAL EVERY 6 HOURS PRN
Qty: 20 TABLET | Refills: 0 | Status: SHIPPED | OUTPATIENT
Start: 2020-06-10 | End: 2020-06-10

## 2020-06-10 RX ORDER — BUDESONIDE AND FORMOTEROL FUMARATE DIHYDRATE 80; 4.5 UG/1; UG/1
2 AEROSOL RESPIRATORY (INHALATION) 2 TIMES DAILY
Qty: 1 INHALER | Refills: 3 | Status: SHIPPED | OUTPATIENT
Start: 2020-06-10 | End: 2021-03-18

## 2020-06-10 RX ORDER — MONTELUKAST SODIUM 10 MG/1
10 TABLET ORAL NIGHTLY
Qty: 30 TABLET | Refills: 3 | Status: SHIPPED | OUTPATIENT
Start: 2020-06-10

## 2020-06-10 RX ORDER — PANTOPRAZOLE SODIUM 20 MG/1
20 TABLET, DELAYED RELEASE ORAL
Qty: 30 TABLET | Refills: 3 | Status: SHIPPED | OUTPATIENT
Start: 2020-06-11

## 2020-06-10 RX ADMIN — Medication 100 MG: at 13:39

## 2020-06-10 RX ADMIN — BUDESONIDE AND FORMOTEROL FUMARATE DIHYDRATE 2 PUFF: 80; 4.5 AEROSOL RESPIRATORY (INHALATION) at 05:08

## 2020-06-10 RX ADMIN — SOTALOL HYDROCHLORIDE 120 MG: 120 TABLET ORAL at 09:57

## 2020-06-10 RX ADMIN — WARFARIN SODIUM 4 MG: 4 TABLET ORAL at 16:47

## 2020-06-10 RX ADMIN — TIOTROPIUM BROMIDE INHALATION SPRAY 2 PUFF: 3.12 SPRAY, METERED RESPIRATORY (INHALATION) at 05:08

## 2020-06-10 RX ADMIN — BUDESONIDE AND FORMOTEROL FUMARATE DIHYDRATE 2 PUFF: 80; 4.5 AEROSOL RESPIRATORY (INHALATION) at 16:06

## 2020-06-10 RX ADMIN — FUROSEMIDE 20 MG: 20 TABLET ORAL at 09:57

## 2020-06-10 RX ADMIN — VITAMIN D, TAB 1000IU (100/BT) 2000 UNITS: 25 TAB at 16:47

## 2020-06-10 RX ADMIN — HYDROXYCHLOROQUINE SULFATE 100 MG: 200 TABLET, FILM COATED ORAL at 09:57

## 2020-06-10 RX ADMIN — Medication 100 MG: at 09:57

## 2020-06-10 RX ADMIN — ACETAMINOPHEN 650 MG: 325 TABLET ORAL at 05:51

## 2020-06-10 RX ADMIN — ACETAMINOPHEN 650 MG: 325 TABLET ORAL at 13:39

## 2020-06-10 RX ADMIN — PANTOPRAZOLE SODIUM 20 MG: 20 TABLET, DELAYED RELEASE ORAL at 05:51

## 2020-06-10 RX ADMIN — Medication 400 MG: at 13:39

## 2020-06-10 ASSESSMENT — PAIN SCALES - GENERAL
PAINLEVEL_OUTOF10: 0
PAINLEVEL_OUTOF10: 3
PAINLEVEL_OUTOF10: 0
PAINLEVEL_OUTOF10: 4
PAINLEVEL_OUTOF10: 4

## 2020-06-10 ASSESSMENT — PAIN DESCRIPTION - ORIENTATION: ORIENTATION: LEFT

## 2020-06-10 ASSESSMENT — PAIN DESCRIPTION - DESCRIPTORS: DESCRIPTORS: ACHING;SORE

## 2020-06-10 ASSESSMENT — PAIN DESCRIPTION - LOCATION: LOCATION: HIP

## 2020-06-10 ASSESSMENT — PAIN DESCRIPTION - PAIN TYPE: TYPE: SURGICAL PAIN

## 2020-06-10 NOTE — PROGRESS NOTES
Assessment completed,no complaints of pain,discomfort. Patient able to  remove own lower compression stockings all by herself. Tolerating cpap machine,sleeping well thus far this shift. A/O x4 Bed in lowest position,call light within reach.

## 2020-06-10 NOTE — PROGRESS NOTES
[facility] OCCUPATIONAL THERAPY DISCHARGE SUMMARY- REHAB     Date: 6/10/2020  Patient Name: Lilliana Nielsen        MRN: [id number]  Account: [de-identified]   : 1935  (81 y.o.)  Room: [place] [address]    Diagnosis:  Imp Mob 2° intertrochanteric fracture S/P IM nailing    Past Medical History:   Diagnosis Date    COPD (chronic obstructive [facility] disease) [facility]     Hypertension     Lupus ([facility]      Past Surgical History:   Procedure Laterality Date    APPENDECTOMY      CARDIAC PACEMAKER PLACEMENT      CHOLECYSTECTOMY      ELBOW SURGERY Left     FEMUR FRACTURE SURGERY Left 2020    INTRAMEDULLARY NAIL LEFT FEMUR performed by Roger Thompson MD [facility]       Precautions:   Restrictions/Precautions: Weight Bearing, Fall Risk  Implants present? : Pacemaker  Left Lower Extremity Weight Bearing: Partial Weight Bearing     Social/Functional History:  Social/Functional History  Lives With: Daughter(son in law and grandson (28 year old))  Type of Home: House  Home Layout: Two level, Bed/Bath upstairs(there is a bathroom on the first floor that usually is not used but patient reported that her family will get it ready for her. has a tub/shower combo)  Home Access: Stairs to enter with rails  Entrance Stairs - Number of Steps: 4-5  Entrance Stairs - Rails: Both  Bathroom Shower/Tub: Tub/Shower unit, Doors  Bathroom Equipment: Shower chair, Grab bars in shower, Hand-held shower, Grab bars around toilet(suction cup grab bars in upstairs tub and a rail around the toilet on the first floor)  Home Equipment: Rolling walker, Cane(hurri cane,)  ADL Assistance: Independent  Homemaking Assistance: Independent(daughter cooks, cleans. patient does her own laundry 1st floor. She tosses a bag of laundry to the first floor from upstairs.  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, kitchen knives or other items that she gets from garage sales etc  Leisure & Hobbies: Enjoys garage sales, active in her [employer], enjoys country music and goes to [employer], belongs to the [employer] society  Additional Comments: Family plans to move a bed to the first floor until patient can do stairs again    Current Functional Status:  ADL  Equipment Provided: Reacher, Sock aid, Long-handled shoe horn, Long-handled sponge, Dressing stick  Feeding: Modified independent   Grooming: Modified independent   UE Bathing: Modified independent   LE Bathing: Modified independent   UE Dressing: Modified independent   LE Dressing: Modified independent   Toileting: Independent  Toilet Transfers  Toilet - Technique: Ambulating  Equipment Used: Grab bars  Toilet Transfer: Modified independent  Toilet Transfers Comments: ww  Tub Transfers  Tub - Transfer From: Nasim Luu  Tub - Transfer Type: To and From  Tub - Transfer To: Transfer tub bench  Tub - Technique: Ambulating  Tub Transfers: Supervision  Tub Transfers Comments: Patient reported that she already owns a tub transfer bench and will have her family put it in the downstairs bathroom for her. Shower Transfers  Shower - Transfer From: Chelo Maxim - Transfer Type: To and From  Shower - Transfer To:  Shower seat with back  Shower - Technique: Ambulating  Shower Transfers: Modified independence  Shower Transfers Comments: Patient able to maintain PWB 25% LLE, grab bars    Orientation Status:  Orientation  Overall Orientation Status: Within Functional Limits    Cognition Status:  Cognition  Overall Cognitive Status: WFL  Cognition Comment: comp - Mod I, exp - modified

## 2020-06-10 NOTE — PROGRESS NOTES
Physical Therapy Rehab Treatment Note  Facility/Department: [facility] REHAB  [facility]       NAME: Keiko Baum  : 1935 (81 y.o.)  MRN: [id number]  CODE STATUS: Full Code    Date of Service: 6/10/2020  Chart Reviewed: Yes  Family / Caregiver Present: No    Restrictions:  Restrictions/Precautions: Weight Bearing, Fall Risk  Lower Extremity Weight Bearing Restrictions  Left Lower Extremity Weight Bearing: Partial Weight Bearing  Partial Weight Bearing Percentage Or Pounds: 25%       SUBJECTIVE:    Pain Screening  Patient Currently in Pain: Yes       Post Treatment Pain Screenin/10  Pain Assessment  Pain Assessment: 0-10  Pain Level: 4  Pain Type: Surgical pain  Pain Location: Hip  Pain Orientation: Left  Pain Descriptors: Aching; Sore    OBJECTIVE:                    Bed mobility  Rolling to Left: Stand by assistance  Rolling to Right: Stand by assistance  Supine to Sit: Stand by assistance  Sit to Supine: Stand by assistance  Comment: Pt struggles to get L LE in/out of bed, needs extra time to complete. Transfers  Sit to Stand: Modified independent  Stand to sit: Modified independent  Bed to Chair: Modified independent  Car Transfer: Modified independent    Ambulation  Ambulation?: Yes  Ambulation 1  Device: Rolling Walker  Assistance: Modified Independent  Quality of Gait: slow step to gait pattern with WB status, good safety noted with turns. No LOB  Distance: 50   Comments: SpO2 of 93% while on Room Air. Stairs/Curb  Stairs?: Yes  Stairs  # Steps : 4  Stairs Height: 6\"  Rails: Right ascending  Device: [employer]  Comment: Non-reciprocal gait pattern with Supervision for safety. Encouraged pt to continue to have someone with her while performing stairs at home until [facility] status changes.           Activity Tolerance  Activity Tolerance: Patient Tolerated treatment well    Exercises  Comments: Reviewed and performed seated HEP, pt performed correctly in chair with no questions or concers. ASSESSMENT/COMMENTS:  Body structures, Functions, Activity limitations: Decreased functional mobility ; Decreased balance;Decreased endurance;Decreased strength; Increased pain  Assessment: Pt perform ambualtion of 50 ft and transfers at a safe Mod Independent level with ambulation distance of 150ft remains at a supervision level due to fatigue levels. Stairs at supervision with use of x1 HR and SBQC  with good WB maintain. Pt remains at a SBA level with bed mobility due to pt's struggles to get L LE in/out of bed and requires intermitten assistance with Left LE to safely get into bed. PT Education: Home Exercise Program;General Safety;Weight-bearing Education  Patient Education: Reviewed WB restrictions and importance to follow in order to protect L LE during healing processs. Pt reviewed HEP with copy provided to pt, no questions or concers reported by pt. Reviewed general safety concers with pt reporting she feels good about going home and has no concers at this time.      PLAN OF CARE/Safety:   Safety Devices  Type of devices: Left in chair;Chair alarm in place;Call light within reach      Therapy Time:   Individual   Time In 1100   Time Out 1145   Minutes 45     Minutes:45      Transfer/Bed mobility training:15      Gait training:15      Neuro re education:0     Therapeutic ex:15      Chio Mail  06/10/20 at 11:52 AM

## 2020-06-10 NOTE — DISCHARGE SUMMARY
Subjective: The patient complains of severe  acute left hip fracture partially relieved by rest, ice, opiate medication list so relieved by plain Tylenol and exacerbated by weightbearing. I am concerned about patients bleeding risk on Coumadin. [address] Course: The patient was admitted to the Rehabilitation Unit to address ADL and mobility deficits. The patient was enrolled in acute PT, OT program.  Weekly team meetings were held to assess functional progress toward their goals. The patient's medical issues were addressed. The patient progressed in the rehab program and is now ready for discharge. Refer to FIM scores summary report for detailed functional status. Greater than 35 minutes was spent on coordinating patients discharge including follow-up care, medications and patient/family education. Extra time needed due to patient's request for use of opiates at discharge for acute on chronic pain she had a recent left hip fracture. ROS x10: The patient also complains of severely impaired mobility and activities of daily living. Otherwise no new problems with vision, hearing, nose, mouth, throat, dermal, cardiovascular, GI, , pulmonary, musculoskeletal, psychiatric or neurological. See Rehab H&P on Rehab chart dated . Vital signs:  /67   Pulse 65   Temp 98 °F (36.7 °C) (Oral)   Resp 17   Ht 5' 5\" (1.651 m)   Wt 164 lb (74.4 kg)   LMP  (LMP Unknown)   SpO2 93%   BMI 27.29 kg/m²   I/O:   PO/Intake:  fair PO intake, no problems observed or reported. Bowel/Bladder:  continent, frequent nocturnal urination-UTI pure wick catheter at night Macrobid until culture results available  General:  Patient is well developed, adequately nourished, non-obese and     well kempt. HEENT:    PERRLA, hearing intact to loud voice, external inspection of ear     and nose benign.   Inspection of lips, tongue and gums benign  Musculoskeletal: No significant change in strength or tone. All joints stable. Inspection and palpation of digits and nails show no clubbing,       cyanosis or inflammatory conditions. Neuro/Psychiatric: Affect: flat but pleasant. Alert and oriented to person, place and     situation. No significant change in deep tendon reflexes or     sensation  Lungs:  Diminished, CTA-B. Respiration effort is normal at rest.     Heart:   S1 = S2, irregular. No loud murmurs. Abdomen:  Soft, non-tender, no enlargement of liver or spleen. Extremities:  No significant lower extremity edema but severe left hip swelling and tenderness. Skin:   Intact to general survey, left hip incision healing    Rehabilitation:  Physical therapy: FIMS:  Bed Mobility:      Transfers: Sit to Stand: Modified independent  Stand to sit: Modified independent  Bed to Chair: Minimal assistance, Contact guard assistance, Ambulation 1  Surface: carpet, level tile  Device: Rolling Walker  Other Apparatus: O2  Assistance: Modified Independent  Quality of Gait: Slow caence with good ability to maintain WB restrictions. Good safety with No LOB. Gait Deviations: Slow Marivel, Decreased step length  Distance: 50ft, 50ft (ramp)  Comments: Pt required a rest break at 75' (SpO2 94% at 76' and 92% after ambulation). O2 tank not used. , Stairs  # Steps : 4  Stairs Height: 6\"  Rails: Right ascending  Curbs: 4\"  Device: Small Johnny [employer]open  Assistance: Stand by assistance  Comment: vc for correction of sequencing. pt brought right leg down initially with steps. Pt SpO2 at 90% after stairs. FIMS:  ,  , Assessment: Pt performed well on ramp ambulation showing control descending. Pt needed VC on technique for getting in and out of bed.      Occupational therapy: FIMS:   ,  ,      Speech therapy: FIMS:        Lab/X-ray studies reviewed, analyzed and discussed with patient and staff:   Recent Results (from the past 24 hour(s))   Protime-INR    Collection Time: 06/10/20  5:13 AM   Result Value Ref Range    Protime 28.0 (H) 12.3 - 14.9 sec    INR 2.6        Xr Chest  : 5/26/2020  NO ACUTE ACTIVE CARDIOPULMONARY PROCESS. RADIOGRAPHIC FINDINGS OF COPD    Xr Chest : 5/26/2020  No acute radiographic abnormality or significant interval change. Emphysema    Xr Femur Left  : 5/26/2020   Left femur complex intertrochanteric fracture. Ct Head  : 5/25/2020    An unenhanced CT scan of the brain demonstrates no evidence of a skull fracture. There is cerebral atrophy and age related findings in the brain. There is no acute CVA. There is no acute intra-axial or extra-axial findings in the brain. There is no hydrocephalus, intracranial mass, hemorrhage, \"mass effect\" or midline shift. The remainder of the CT scan of the brain appears unremarkable. 1. CEREBRAL ATROPHY AND AGE RELATED FINDINGS IN THE BRAIN. 2. NO ACUTE INTRA-AXIAL OR EXTRA-AXIAL FINDINGS IN THE BRAIN. Ct Cervical Spine : 5/25/2020   1. DEGENERATIVE AND ARTHRITIC CHANGES THROUGHOUT THE C-SPINE AS DESCRIBED. 2.  NO ACUTE FRACTURE, SUBLUXATION OR DISLOCATION INVOLVING THE CERVICAL SPINE. Xr Chest   5/28/2020   FINDINGS: Single  views of the chest is submitted. Left-sided CCD device. Leads overlying the cardiac silhouette. Unchanged The cardiac silhouette is of normal size configuration. . Pulmonary vascular unremarkable. Right sided trachea. No focal infiltrates. No Pneumothoraces. Old healed right humeral head fracture                                                                                   NO ACUTE ACTIVE CARDIOPULMONARY PROCESS    Fluoro For Surgical  5/27/2020  EXAMINATION:  FLUORO FOR SURGICAL PROCEDURES CLINICAL HISTORY:  ORIF Left Femur COMPARISONS:  Left femur radiographs 5/26/2020 TECHNIQUE: Intraoperative fluoroscopy. Fluoroscopic time 34.9 seconds. 9 screen capture images. FINDINGS:  Submitted screen capture images document left femoral TFN fixation in near-anatomic alignment. See operative report for correlation. elevate head of bed with Afrin nasal spray at night if patient is noncompliant with the CPAP. 3.   Hyperlipidemia,   Atrial fibrillation, Coronary atherosclerosis, Essential hypertension,   Peripheral vascular disease, Sick sinus syndrome-vital signs every shift dose and titrate cardiac medications to include Coumadin, Prinivil, Betapace, consult hospitalist for backup medical  4. Tobacco user-stop smoking counseling-add NicoDerm  5. Vitamin D deficiency-add vitamin D treat for osteoporosis  6. Hyponatremia, Hyperkalemia-supplement accordingly recheck BMP-consider holding ACE inhibitor  7. Whole body arthritis and inflammation due to Lupus-monitor for blood clots patient is on Coumadin she is on daily Deltasone for her lungs and lupus as well as Plaquenil  8. Steroid-induced type 2 diabetes-limit steroids to lowest effective dose add carbohydrate restriction of the diet check hemoglobin A1c  9.  Acute UTI-trial Macrobid check postvoid residuals monitor for retention correct blood sugars        Zay Ch D.O., PM&R     Attending    [address]

## 2020-06-15 ENCOUNTER — TELEPHONE (OUTPATIENT)
Dept: ORTHOPEDIC SURGERY | Age: 85
End: 2020-06-15

## 2020-06-26 ENCOUNTER — HOSPITAL ENCOUNTER (OUTPATIENT)
Dept: GENERAL RADIOLOGY | Age: 85
Discharge: HOME | End: 2020-06-28
Payer: MEDICARE

## 2020-06-26 ENCOUNTER — OFFICE VISIT (OUTPATIENT)
Dept: ORTHOPEDIC SURGERY | Age: 85
End: 2020-06-26
Payer: MEDICARE

## 2020-06-26 VITALS
HEART RATE: 66 BPM | TEMPERATURE: 97.4 F | HEIGHT: 65 IN | OXYGEN SATURATION: 95 % | BODY MASS INDEX: 26.82 KG/M2 | WEIGHT: 161 LBS

## 2020-06-26 PROCEDURE — 73502 X-RAY EXAM HIP UNI 2-3 VIEWS: CPT

## 2020-06-26 PROCEDURE — 73502 X-RAY EXAM HIP UNI 2-3 VIEWS: CPT | Performed by: ORTHOPAEDIC SURGERY

## 2020-06-26 PROCEDURE — 99024 POSTOP FOLLOW-UP VISIT: CPT | Performed by: ORTHOPAEDIC SURGERY

## 2020-06-26 NOTE — PROGRESS NOTES
Cigarettes    Smokeless tobacco: Never Used   Substance and Sexual Activity    Alcohol use: Not Currently    Drug use: Never    Sexual activity: Not Currently     Partners: Male   Lifestyle    Physical activity     Days per week: 0 days     Minutes per session: 0 min    Stress: Only a little   Relationships    Social connections     Talks on phone: More than three times a week     Gets together: Once a week     Attends [employer] service: 1 to 4 times per year     Active member of club or organization: No     Attends meetings of clubs or organizations: Never     Relationship status:     Intimate partner violence     Fear of current or ex partner: No     Emotionally abused: No     Physically abused: No     Forced sexual activity: No   Other Topics Concern    Not on file   Social History Narrative         Lives With: Family    Type of Home: [id number] [facility] [id number] [place]: Two level, Bed/Bath upstairs    Bathroom Shower/Tub: Tub/Shower unit    Bathroom Equipment: Shower chair, Grab bars in [facility]: 4 wheeled walker    ADL Assistance: Independent    Homemaking Assistance: Independent    Homemaking Responsibilities: Yes    Laundry Responsibility: Primary    Cleaning Responsibility: (Pt completes dishes and light homemaking and completes her own laundry management)    Ambulation Assistance: Independent    Transfer Assistance: Independent    Additional Comments: wears 02 at night     History reviewed. No pertinent family history.   Allergies   Allergen Reactions    Atorvastatin      Other reaction(s): leg cramps    Lipitor [Atorvastatin Calcium]      Leg cramping     Current Outpatient Medications on File Prior to Visit   Medication Sig Dispense Refill    Vitamin D 400 UNIT TABS tablet Take 5 tablets by mouth Daily with supper 60 tablet 2    furosemide (LASIX) 20 MG tablet Take 1 tablet by mouth daily 60 tablet 3    pantoprazole (PROTONIX) 20 MG tablet Take 1 is neurologically intact that extremity with no asymmetrical swelling to that lower extremity on the left. Assessment:       Diagnosis Orders   1. Closed intertrochanteric fracture of hip, left, initial encounter ([facility] [address]  XR HIP 2-3 VW W PELVIS LEFT         Plan:      Orders Placed This Encounter   Procedures    XR HIP 2-3 VW W PELVIS LEFT     Standing Status:   Future     Standing Expiration Date:   6/26/2021     No orders of the defined types were placed in this encounter. I have shared the x-rays with the patient and her family. My recommendation at this time is to weight-bear as tolerated and that is progression in her weightbearing status. The patient will follow-up in 4 to 6 weeks for repeat films and will likely hopefully be follow-up and final films of her left hip in 2 projections. Comfortable to plan a prescription is given to her her for her therapist for advancement to full weightbearing on the left lower extremity. Recommended Tylenol and over-the-counter anti-inflammatories for any pain that she should have in 1 month out. No follow-ups on file.       Janes Johnson MD

## 2020-06-28 PROBLEM — R05.9 COUGH: Status: RESOLVED | Noted: 2018-03-01 | Resolved: 2020-06-28

## 2020-07-10 ENCOUNTER — TELEPHONE (OUTPATIENT)
Dept: ORTHOPEDIC SURGERY | Age: 85
End: 2020-07-10

## 2020-08-28 ENCOUNTER — HOSPITAL ENCOUNTER (OUTPATIENT)
Dept: GENERAL RADIOLOGY | Age: 85
Discharge: HOME | End: 2020-08-30
Payer: MEDICARE

## 2020-08-28 ENCOUNTER — OFFICE VISIT (OUTPATIENT)
Dept: ORTHOPEDIC SURGERY | Age: 85
End: 2020-08-28
Payer: MEDICARE

## 2020-08-28 VITALS
OXYGEN SATURATION: 97 % | BODY MASS INDEX: 26.82 KG/M2 | RESPIRATION RATE: 14 BRPM | WEIGHT: 161 LBS | HEART RATE: 64 BPM | TEMPERATURE: 97.1 F | HEIGHT: 65 IN

## 2020-08-28 PROCEDURE — 4040F PNEUMOC VAC/ADMIN/RCVD: CPT | Performed by: ORTHOPAEDIC SURGERY

## 2020-08-28 PROCEDURE — 1036F TOBACCO NON-USER: CPT | Performed by: ORTHOPAEDIC SURGERY

## 2020-08-28 PROCEDURE — 1123F ACP DISCUSS/DSCN MKR DOCD: CPT | Performed by: ORTHOPAEDIC SURGERY

## 2020-08-28 PROCEDURE — G8417 CALC BMI ABV UP PARAM F/U: HCPCS | Performed by: ORTHOPAEDIC SURGERY

## 2020-08-28 PROCEDURE — 73502 X-RAY EXAM HIP UNI 2-3 VIEWS: CPT | Performed by: ORTHOPAEDIC SURGERY

## 2020-08-28 PROCEDURE — 99213 OFFICE O/P EST LOW 20 MIN: CPT | Performed by: ORTHOPAEDIC SURGERY

## 2020-08-28 PROCEDURE — G8400 PT W/DXA NO RESULTS DOC: HCPCS | Performed by: ORTHOPAEDIC SURGERY

## 2020-08-28 PROCEDURE — 1090F PRES/ABSN URINE INCON ASSESS: CPT | Performed by: ORTHOPAEDIC SURGERY

## 2020-08-28 PROCEDURE — G8427 DOCREV CUR MEDS BY ELIG CLIN: HCPCS | Performed by: ORTHOPAEDIC SURGERY

## 2020-08-28 PROCEDURE — 73502 X-RAY EXAM HIP UNI 2-3 VIEWS: CPT

## 2020-08-28 NOTE — PROGRESS NOTES
Subjective:      Patient ID: Cassie Bynum is a 81 y.o. female who presents today for:  Chief Complaint   Patient presents with    Post-Op Check     6 wk f/u INTRAMEDULLARY NAIL LEFT FEMUR 5/27; pt says that sometimes it is good and sometimes it is bad but the last days she has had problems with swelling in her ankles and she thinks that her leg may be the cause of it; still pretty dependant on her rollator; rates her pain today 4-5/10 and she says that it is in the left groin area       HPI    Patient is status post surgery in May. The patient is doing wonderfully she has no pain or discomfort in the area she uses a cane which she did not use preoperatively but she is actually getting around pretty good. Xr Hip 2-3 Vw W Pelvis Left    Result Date: 8/28/2020  Xrays of pelvis and hip and 2 views demonstrate the carlos alberto screw construct in satisfactory position. I compared these films to previous views and there is no change in alignment outside additional healing. Past Medical History:   Diagnosis Date    [facility] obstructive [facility] disease) [facility] [address]     Hypertension     Lupus ([facility] [address]      Past Surgical History:   Procedure Laterality Date    APPENDECTOMY      CARDIAC PACEMAKER PLACEMENT      CHOLECYSTECTOMY      ELBOW SURGERY Left     FEMUR FRACTURE SURGERY Left 5/27/2020    INTRAMEDULLARY NAIL LEFT FEMUR performed by Bryce Chavira MD at [address] [place] [address] History     Socioeconomic History    Marital status:       Spouse name: Not on file    Number of children: Not on file    Years of education: Not on file    Highest education level: Not on file   Occupational History    Not on file   Social Needs    Financial resource strain: Not hard at all    Food insecurity     Worry: Never true     Inability: Never true   [employer] needs     Medical: No     Non-medical: No   Tobacco Use    Smoking status: Former Smoker     Types: Cigarettes    Smokeless tobacco: Never Used Substance and Sexual Activity    Alcohol use: Not Currently    Drug use: Never    Sexual activity: Not Currently     Partners: Male   Lifestyle    Physical activity     Days per week: 0 days     Minutes per session: 0 min    Stress: Only a little   Relationships    Social connections     Talks on phone: More than three times a week     Gets together: Once a week     Attends Taoism service: 1 to 4 times per year     Active member of club or organization: No     Attends meetings of clubs or organizations: Never     Relationship status:     Intimate partner violence     Fear of current or ex partner: No     Emotionally abused: No     Physically abused: No     Forced sexual activity: No   Other Topics Concern    Not on file   Social History Narrative         Lives With: Family    Type of Home: [id number] [address] [address] [place]: Two level, Bed/Bath upstairs    Bathroom Shower/Tub: Tub/Shower unit    Bathroom Equipment: Shower chair, Grab bars in [address]: 4 wheeled walker    ADL Assistance: Independent    Homemaking Assistance: Independent    Homemaking Responsibilities: Yes    Laundry Responsibility: Primary    Cleaning Responsibility: (Pt completes dishes and light homemaking and completes her own laundry management)    Ambulation Assistance: Independent    Transfer Assistance: Independent    Additional Comments: wears 02 at night     No family history on file.   Allergies   Allergen Reactions    Atorvastatin      Other reaction(s): leg cramps    Lipitor [Atorvastatin Calcium]      Leg cramping     Current Outpatient Medications on File Prior to Visit   Medication Sig Dispense Refill    Vitamin D 400 UNIT TABS tablet Take 5 tablets by mouth Daily with supper 60 tablet 2    furosemide (LASIX) 20 MG tablet Take 1 tablet by mouth daily 60 tablet 3    pantoprazole (PROTONIX) 20 MG tablet Take 1 tablet by mouth every morning (before breakfast) 30 tablet 3    lisinopril (PRINIVIL;ZESTRIL) 40 MG tablet Take 1 tablet by mouth nightly 30 tablet 3    tiotropium (SPIRIVA RESPIMAT) 2.5 MCG/ACT AERS inhaler Inhale 2 puffs into the lungs daily 1 Inhaler 1    montelukast (SINGULAIR) 10 MG tablet Take 1 tablet by mouth nightly 30 tablet 3    budesonide-formoterol (SYMBICORT) 80-4.5 MCG/ACT AERO Inhale 2 puffs into the lungs 2 times daily 1 Inhaler 3    sotalol (BETAPACE) 120 MG tablet Take 120 mg by mouth 2 times daily      hydroxychloroquine (PLAQUENIL) 200 MG tablet Take 100 mg by mouth 2 times daily      aspirin 81 MG chewable tablet Take 81 mg by mouth daily      potassium chloride (KLOR-CON M) 20 MEQ extended release tablet Take 20 mEq by mouth 2 times daily      magnesium oxide (MAG-OX) 400 MG tablet Take 400 mg by mouth daily      hydroCHLOROthiazide (MICROZIDE) 12.5 MG capsule Take 12.5 mg by mouth daily      albuterol (PROVENTIL) (2.5 MG/3ML) 0.083% nebulizer solution Take 2.5 mg by nebulization every 6 hours as needed for Wheezing      warfarin (COUMADIN) 4 MG tablet Take 1 tablet by mouth Once for 1 dose 30 tablet 3     No current facility-administered medications on file prior to visit. Review of Systems  She has no fever chills night sweats shortness of breath. Objective:   Pulse 64   Temp 97.1 °F (36.2 °C) (Temporal)   Resp 14   Ht 5' 5\" (1.651 m)   Wt 161 lb (73 kg)   LMP  (LMP Unknown)   SpO2 97%   BMI 26.79 kg/m²     ORTHOEXAM    The patient is able to ambulate with a cane she has a little shortened gait but observed ambulate full weightbearing. She has no pain to palpation she has reported well-healed incision she is neurologically intact distally to the left lower extremity. Assessment:       Diagnosis Orders   1.  Closed intertrochanteric fracture of hip, left, initial encounter [facility])           Plan:   Recommendation at this time is weightbearing as tolerated she has no restrictions follow-up at this point will be PRN should she

## 2021-01-07 ENCOUNTER — HOSPITAL ENCOUNTER (OUTPATIENT)
Dept: RADIATION ONCOLOGY | Age: 86
Discharge: HOME | End: 2021-01-07
Payer: MEDICARE

## 2021-01-07 ENCOUNTER — HOSPITAL ENCOUNTER (OUTPATIENT)
Dept: RADIATION ONCOLOGY | Age: 86
Discharge: HOME | End: 2021-01-07
Attending: RADIOLOGY
Payer: MEDICARE

## 2021-01-07 VITALS
SYSTOLIC BLOOD PRESSURE: 178 MMHG | TEMPERATURE: 97.6 F | RESPIRATION RATE: 16 BRPM | OXYGEN SATURATION: 95 % | DIASTOLIC BLOOD PRESSURE: 76 MMHG | WEIGHT: 139 LBS | HEART RATE: 81 BPM | BODY MASS INDEX: 23.13 KG/M2

## 2021-01-07 PROCEDURE — 77290 THER RAD SIMULAJ FIELD CPLX: CPT | Performed by: RADIOLOGY

## 2021-01-07 PROCEDURE — 99212 OFFICE O/P EST SF 10 MIN: CPT | Performed by: RADIOLOGY

## 2021-01-07 PROCEDURE — 77334 RADIATION TREATMENT AID(S): CPT | Performed by: RADIOLOGY

## 2021-01-07 RX ORDER — NITROGLYCERIN 0.4 MG/1
0.4 TABLET SUBLINGUAL EVERY 5 MIN PRN
COMMUNITY

## 2021-01-07 RX ORDER — FERROUS SULFATE 325(65) MG
325 TABLET ORAL
COMMUNITY

## 2021-01-07 RX ORDER — CHLORAL HYDRATE 500 MG
3000 CAPSULE ORAL 3 TIMES DAILY
COMMUNITY

## 2021-01-07 RX ORDER — M-VIT,TX,IRON,MINS/CALC/FOLIC 27MG-0.4MG
1 TABLET ORAL DAILY
COMMUNITY
End: 2021-03-18 | Stop reason: ALTCHOICE

## 2021-01-07 NOTE — H&P
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 Dear Dr Lino Benton,     CURRENT COMPLAINT: Here today to discuss treatment options    HPI: I was asked by Dr. Lino Benton to see this 81 y.o. female who has recently diagnosed squamous cell lung cancer. Pet scan from 11/18/2020 showed a 1.5 x 1.3 cm mass in right upper lung with  SUV of 10.3. right paratracheal node measuring 1.2 cm with SUV of 3.4. Right hilar node with SUV of 3.3. Biopsy from 12/5/2020 showed squamous cell lung cancer. This was complicated by a pneumothorax which is now healed after chest tube placement. Discussion with dr Devonte Moses and dr Skinner Nab that because of poor PFT from severe COPD, she is not a candidate for surgery or even combined treatment with chemoradiation. Her PFTs on 7/14/20 FEV1 36% and DLCO 33%. She has obstructive sleep apnea CPAP 9, PAT and pacemaker. She has SOB at rest and LAZO, chronic cough. Former smoker. Oxygen 2L NC only at night with CPAP. Here today for evaluation for definitive radiation treatment. PAST MEDICAL HISTORY:   Past Medical History:   Diagnosis Date    [facility] obstructive [facility] disease) [facility]     Hypertension     Lupus ([facility]        PAST SURGICAL HISTORY:  Past Surgical History:   Procedure Laterality Date    APPENDECTOMY      CARDIAC PACEMAKER PLACEMENT      CHOLECYSTECTOMY      ELBOW SURGERY Left     FEMUR FRACTURE SURGERY Left 5/27/2020    INTRAMEDULLARY NAIL LEFT FEMUR performed by Aleks Sharp MD at [address] [place] [address] Drive:   Allergies   Allergen Reactions    Atorvastatin      Other reaction(s): leg cramps    Lipitor [Atorvastatin Calcium]      Leg cramping       CURRENT MEDICATIONS:   Prior to Admission medications    Medication Sig Start Date End Date Taking?  Authorizing Provider   Vitamin D 400 UNIT TABS tablet Take 5 tablets by mouth Daily with supper 6/10/20   Chelo Armenta,    furosemide (LASIX) 20 MG tablet Take 1 tablet by mouth daily 6/11/20   RON Preciado CNP   pantoprazole (PROTONIX) 20 MG tablet Take 1 tablet by mouth every morning (before breakfast) 6/11/20   RON Preciado CNP   lisinopril (PRINIVIL;ZESTRIL) 40 MG tablet Take 1 tablet by mouth nightly 6/10/20   RON Preciado CNP   warfarin (COUMADIN) 4 MG tablet Take 1 tablet by mouth Once for 1 dose 6/10/20 6/10/20  RON Preciado CNP   tiotropium (SPIRIVA RESPIMAT) 2.5 MCG/ACT AERS inhaler Inhale 2 puffs into the lungs daily 6/11/20   RON Preciado CNP   montelukast (SINGULAIR) 10 MG tablet Take 1 tablet by mouth nightly 6/10/20   RON Preicado CNP   budesonide-formoterol (SYMBICORT) 80-4.5 MCG/ACT AERO Inhale 2 puffs into the lungs 2 times daily 6/10/20   RON Preciado CNP   sotalol (BETAPACE) 120 MG tablet Take 120 mg by mouth 2 times daily    Historical Provider, MD   hydroxychloroquine (PLAQUENIL) 200 MG tablet Take 100 mg by mouth 2 times daily    Historical Provider, MD   aspirin 81 MG chewable tablet Take 81 mg by mouth daily    Historical Provider, MD   potassium chloride (KLOR-CON M) 20 MEQ extended release tablet Take 20 mEq by mouth 2 times daily    Historical Provider, MD   magnesium oxide (MAG-OX) 400 MG tablet Take 400 mg by mouth daily    Historical Provider, MD   hydroCHLOROthiazide (MICROZIDE) 12.5 MG capsule Take 12.5 mg by mouth daily    Historical Provider, MD   albuterol (PROVENTIL) (2.5 MG/3ML) 0.083% nebulizer solution Take 2.5 mg by nebulization every 6 hours as needed for Wheezing    Historical Provider, MD CAMARENA have personally reviewed and reconciled the medications listed. FAMILY HISTORY:   No family history on file.     SOCIAL HISTORY:   Social History     Tobacco Use   Smoking Status Former Smoker    Types: Cigarettes   Smokeless Tobacco Never Used     Social History     Substance and Sexual Activity   Alcohol Use Not Currently       Review Of Systems:  Pain Score: denies      Is pain affecting your ability to take care of yourself or move throughout your home? []? Yes   [x]? No    General:   Patient has gained weight []? Yes   []? No  Patient has lost weight [x]? Yes   []? No  How much weight in pounds and over what length of time: about 25 pounds within 65 days  Eyes (Ophthalmic): denies                Skin (Dermatological): denies                ENT: some difficulty swallowing sometimes-food gets stuck and vomits- intermittent- about 3x a week. Respiratory: frequent cough- yellow mucus- no hemoptysis. Wears o2 2l nasal cannula at night and PRN  Has chronic runny nose. CPAP at night . Gets SOB with ambulation. Cardiovascular:                             Device   [x]? Yes   []? No              Copy of Card Obtained [x]? Yes   []? No     Gastrointestinal: has intermittent difficult swallowing and at times will vomit up what she ate  Genito-Urinary:   denies       Breast: denies                Musculoskeletal: had some arthritis but not requiring any intervention now     Neurological: numbness and tingling of both hands and feet                            Hematological and Lymphatic: denies                Endocrine: denies     Gyn History:   /Para:  3/3  Age of Menarche:  16  Age of Menopause 40  First Pregnancy: 22  Breast Feeding:  no  Last Menstrual period: 40  Oral Contraceptives: no     Number of years:   Hormone Replacement therapy-no     Number of Years:   Last Pap Smear: more than 10 years  Last Mammogram-       A 10-point review of systems has been conducted and pertinent positives have been   recorded. All other review of systems are negative. ECOG PERFORMANCE STATUS: 2    VITAL SIGNS:   T 97.6 178/76  P81 R 16 Wt 139 95% O2 on RA    PHYSICAL EXAMINATION:  Constitutional: Elderly, No acute distress.  awake, alert, cooperative    HEENT:  Normocephalic, without obvious abnormality, atraumatic, EOMI, external ears without lesions, oral pharynx with moist mucus membranes, orophayrnx clear, mucous membranes moist    NECK:  Supple, without supraclavicular or cervical adenopathy, thyroid symmetric, not enlarged    CARDIOVASCULAR:  Normal apical impulse, regular rate and rhythm, normal S1 and S2, no S3 or S4, and no murmur noted    LUNGS:   increased work of breathing, poor air exchange, + faint bilateral wheezing,    ABDOMEN:  Nontender, nondistended, normal bowel sounds, soft, no masses, no hepatosplenomegally    EXTREMITIES: clubbing and 1+ bipedal  edema    MUSCULOSKELETAL: No bony tenderness along the hips ribs or spine. Motor strength is 5 out of 5 all extremities bilaterally. Tone is normal.    NEUROLOGIC:  Awake, alert, oriented x 3. Nonfocal    SKIN:  no bruising or bleeding    STUDIES: I have personally reviewed the imaging, laboratory, and pathology studies as previously described. 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 Severe COPD so no surgery. SBRT/IGTRT 12. 5GY x 4 as definitive therapy using pacer protocol to avoid excess dose to pacer as she is dependent. Start tx 1/18/21    Intent of treatment, potential risks benefits and side effects reviewed today. Thank you for this consult and allowing us to participate in the care of this patient.   Sincerely,    Electronically signed by Pato Greco MD on 1/7/21 at 12:39 PM EST      cc:   No att. providers found  Finas Habermann, DO Priscille Loa  No address on file

## 2021-01-12 PROCEDURE — 77300 RADIATION THERAPY DOSE PLAN: CPT | Performed by: RADIOLOGY

## 2021-01-12 PROCEDURE — 77334 RADIATION TREATMENT AID(S): CPT | Performed by: RADIOLOGY

## 2021-01-12 PROCEDURE — 77295 3-D RADIOTHERAPY PLAN: CPT | Performed by: RADIOLOGY

## 2021-01-12 PROCEDURE — 77293 RESPIRATOR MOTION MGMT SIMUL: CPT | Performed by: RADIOLOGY

## 2021-01-13 PROCEDURE — 77370 RADIATION PHYSICS CONSULT: CPT | Performed by: RADIOLOGY

## 2021-01-18 ENCOUNTER — APPOINTMENT (OUTPATIENT)
Dept: RADIATION ONCOLOGY | Age: 86
End: 2021-01-18
Attending: RADIOLOGY
Payer: MEDICARE

## 2021-01-18 ENCOUNTER — HOSPITAL ENCOUNTER (OUTPATIENT)
Dept: RADIATION ONCOLOGY | Age: 86
Discharge: HOME | End: 2021-01-18
Attending: RADIOLOGY
Payer: MEDICARE

## 2021-01-18 PROCEDURE — 77280 THER RAD SIMULAJ FIELD SMPL: CPT | Performed by: RADIOLOGY

## 2021-01-18 PROCEDURE — 99214 OFFICE O/P EST MOD 30 MIN: CPT | Performed by: RADIOLOGY

## 2021-01-18 PROCEDURE — 77373 STRTCTC BDY RAD THER TX DLVR: CPT | Performed by: RADIOLOGY

## 2021-01-18 RX ORDER — CYANOCOBALAMIN (VITAMIN B-12) 1000 MCG
1000 TABLET, EXTENDED RELEASE ORAL DAILY
COMMUNITY

## 2021-01-20 ENCOUNTER — HOSPITAL ENCOUNTER (OUTPATIENT)
Dept: RADIATION ONCOLOGY | Age: 86
Discharge: HOME | End: 2021-01-20
Attending: RADIOLOGY
Payer: MEDICARE

## 2021-01-20 DIAGNOSIS — C34.11 MALIGNANT NEOPLASM OF UPPER LOBE OF RIGHT LUNG (HCC): Primary | ICD-10-CM

## 2021-01-20 PROCEDURE — 99212 OFFICE O/P EST SF 10 MIN: CPT | Performed by: RADIOLOGY

## 2021-01-20 PROCEDURE — 77373 STRTCTC BDY RAD THER TX DLVR: CPT | Performed by: RADIOLOGY

## 2021-01-20 PROCEDURE — 77331 SPECIAL RADIATION DOSIMETRY: CPT | Performed by: RADIOLOGY

## 2021-01-21 ENCOUNTER — APPOINTMENT (OUTPATIENT)
Dept: RADIATION ONCOLOGY | Age: 86
End: 2021-01-21
Payer: MEDICARE

## 2021-01-22 ENCOUNTER — APPOINTMENT (OUTPATIENT)
Dept: RADIATION ONCOLOGY | Age: 86
End: 2021-01-22
Attending: RADIOLOGY
Payer: MEDICARE

## 2021-01-25 ENCOUNTER — HOSPITAL ENCOUNTER (OUTPATIENT)
Dept: RADIATION ONCOLOGY | Age: 86
Discharge: HOME | End: 2021-01-25
Attending: RADIOLOGY
Payer: MEDICARE

## 2021-01-25 PROCEDURE — 77373 STRTCTC BDY RAD THER TX DLVR: CPT | Performed by: RADIOLOGY

## 2021-01-25 PROCEDURE — 99211 OFF/OP EST MAY X REQ PHY/QHP: CPT | Performed by: RADIOLOGY

## 2021-01-26 NOTE — ONCOLOGY
Radiation Oncology Completion Letter    Patient Name:   Jerry Forman  Medical Record #: [id number]   Date of Birth: [birth year]  Diagnosis: stage 1A2 [id number]  NSCLC 1.7 cm RUL SCCA   Treatment Dates:  1/18, 1/20, 1/25 and 1/27/21    Site: Dose: Total # fractions: Dose per fraction: Energy: Technique Isodose Line:   RUL  50 Gy 4 12.5 Gy 6FFF MV photons VMAT/IMRT/IGRT 90.3%     Concurrent therapy:  none    Technique:   SBRT using VMAT Intensity modulation (IMRT) was used to optimize the dose distribution and spare normal tissue toxicity. This was delivered with a set of 2 counter rotating VMAT arcs using 10MV photons modulated with an [facility]. Daily cone beam CT image guidance (IGRT) was used to allow reduced planning margins reducing potential side effects. Treatment course:  Ms. Pepe Carrington tolerated radiation treatment well with the expected toxicity. Patient was instructed to return to our clinic in approximately 6 weeks post radiation treatment to assess response with CT and again in 3 months with PETCT.

## 2021-01-27 ENCOUNTER — APPOINTMENT (OUTPATIENT)
Dept: RADIATION ONCOLOGY | Age: 86
End: 2021-01-27
Attending: RADIOLOGY
Payer: MEDICARE

## 2021-01-27 ENCOUNTER — HOSPITAL ENCOUNTER (OUTPATIENT)
Dept: RADIATION ONCOLOGY | Age: 86
Discharge: HOME | End: 2021-01-27
Attending: RADIOLOGY
Payer: MEDICARE

## 2021-01-27 PROCEDURE — 77336 RADIATION PHYSICS CONSULT: CPT | Performed by: RADIOLOGY

## 2021-01-27 PROCEDURE — 99211 OFF/OP EST MAY X REQ PHY/QHP: CPT | Performed by: RADIOLOGY

## 2021-01-27 PROCEDURE — 77373 STRTCTC BDY RAD THER TX DLVR: CPT | Performed by: RADIOLOGY

## 2021-03-10 ENCOUNTER — HOSPITAL ENCOUNTER (OUTPATIENT)
Dept: CT IMAGING | Age: 86
Discharge: HOME | End: 2021-03-12
Payer: MEDICARE

## 2021-03-10 VITALS — BODY MASS INDEX: 24.99 KG/M2 | HEIGHT: 65 IN | WEIGHT: 150 LBS

## 2021-03-10 DIAGNOSIS — C34.11 MALIGNANT NEOPLASM OF UPPER LOBE OF RIGHT LUNG (HCC): ICD-10-CM

## 2021-03-10 PROCEDURE — 6360000004 HC RX CONTRAST MEDICATION: Performed by: RADIOLOGY

## 2021-03-10 PROCEDURE — 71260 CT THORAX DX C+: CPT

## 2021-03-10 RX ORDER — SODIUM CHLORIDE 0.9 % (FLUSH) 0.9 %
10 SYRINGE (ML) INJECTION ONCE
Status: DISCONTINUED | OUTPATIENT
Start: 2021-03-10 | End: 2021-03-13 | Stop reason: HOSPADM

## 2021-03-10 RX ADMIN — IOPAMIDOL 100 ML: 612 INJECTION, SOLUTION INTRAVENOUS at 15:36

## 2021-03-11 ENCOUNTER — HOSPITAL ENCOUNTER (OUTPATIENT)
Dept: RADIATION ONCOLOGY | Age: 86
Discharge: HOME | End: 2021-03-11
Attending: RADIOLOGY
Payer: MEDICARE

## 2021-03-11 VITALS
WEIGHT: 137 LBS | RESPIRATION RATE: 18 BRPM | SYSTOLIC BLOOD PRESSURE: 123 MMHG | TEMPERATURE: 98.2 F | BODY MASS INDEX: 22.8 KG/M2 | OXYGEN SATURATION: 95 % | HEART RATE: 71 BPM | DIASTOLIC BLOOD PRESSURE: 58 MMHG

## 2021-03-11 LAB
GFR AFRICAN AMERICAN: >60
GFR NON-AFRICAN AMERICAN: >60
PERFORMED ON: NORMAL
POC CREATININE: 0.6 MG/DL (ref 0.6–1.2)
POC SAMPLE TYPE: NORMAL

## 2021-03-11 PROCEDURE — 99212 OFFICE O/P EST SF 10 MIN: CPT | Performed by: RADIOLOGY

## 2021-03-11 RX ORDER — METHYLPREDNISOLONE 4 MG/1
TABLET ORAL
Qty: 1 KIT | Refills: 1 | Status: SHIPPED | OUTPATIENT
Start: 2021-03-11 | End: 2021-03-17

## 2021-03-11 NOTE — PROGRESS NOTES
NURSING ASSESSMENT     Date: 3/11/2021        Patient Name: Sara Trujillo     YOB: 1935      Age:  81 y.o. MRN: [id number]     Chaperone [] Yes   [x] No        Pain Score:   Pain Score (1-10): none     General: Fatigue  Patient has gained weight [] Yes   [x] No  Patient has lost weight [] Yes   [x] No    Eyes (Ophthalmic): No Change     Skin (Dermatological): No Problems     ENT: No Problems     Respiratory: Cough, SOB, LAZO and Oxygen pt states coughing more and more sob since treatment and has increased use of oxygen 3 liters during the day, wears CPAP at night     Cardiovascular: Edema in feet       Device   [x] Yes   [] No   Copy of Card Obtained [] Yes   [x] No obtained at consult               Last pacemaker check done 1/27/21    Gastrointestinal: No Problems    Genito-Urinary: No Problems     Breast: No Problems     Musculoskeletal: Joint Pain, mild left hip since fracture last year    Neurological: Tingling in fingers and feet      Hematological and Lymphatic: Easy Bruising     Endocrine: No Problems     Gyn History:   No problems     A 10-point review of systems has been conducted and pertinent positives have been   recorded. All other review of systems are negative    Was the patient admitted during the course of treatment OR within 30 days of treatment?  no    Additional Comments: Had CT Scan chest done yesterday at [facility] [place]

## 2021-03-11 NOTE — H&P
RADIATION FOLLOW UP NOTE  DATE OF VISIT: 3/11/2021    DIAGNOSIS & STAGING: stage 1A2 [id number]  NSCLC 1.7 cm RUL SCCA     PREVIOUS TREATMENT:   Treatment Dates:                    1/18, 1/20, 1/25 and 1/27/21     Site: Dose: Total # fractions: Dose per fraction: Energy: Technique Isodose Line:   RUL  50 Gy 4 12.5 Gy 6FFF MV photons VMAT/IMRT/IGRT 90.3%      Concurrent therapy:             none    CURRENT COMPLAINT: Here today for results of 6 weeks CT scan, increased cough and LAZO    INTERVAL HISTORY: China Vu reports more coughing and LAZO without increased baseline SOB. No fevers chills. She has severe COPD and is followed by pulmonology. She has a longstanding history of difficulty swallowing with food getting stuck, never been worked up. Recently switched PCPC. Here @ 6 w s/p treatment with followup CT scan. The report is not finalized but I personally compared current imaging to prior imaging at [address] [place] [address] and treatment planning images. The treated tumor in the post RUL is now significantly smaller than pretreatment. Anterior to this area there is a wide area of ground glass appearing lung with minimal nodularity. This is an odd location for radiation pneumonitis due to the low dose that was passing through this area and minimal changes c/w radiation pneumonitis posteriorly at the treatment target site. No other masses or enlarged LN noted.      PAST MEDICAL HISTORY:   Past Medical History:   Diagnosis Date    Anticoagulant long-term use     Atrial fibrillation (HCC)     Cancer (HCC)     RUL lung    COPD (chronic obstructive pulmonary disease) (HCC)     Hypertension     Lupus ([address]     Neuropathy     Osteoarthritis        PAST SURGICAL HISTORY:  Past Surgical History:   Procedure Laterality Date    APPENDECTOMY      CARDIAC PACEMAKER PLACEMENT      CHOLECYSTECTOMY      ELBOW SURGERY Left     FEMUR FRACTURE SURGERY Left 5/27/2020    INTRAMEDULLARY NAIL LEFT FEMUR performed by [facility]ymar Morales [facility] ALLERGIES:   Allergies   Allergen Reactions    Atorvastatin      Other reaction(s): leg cramps    Lipitor [Atorvastatin Calcium]      Leg cramping        I have personally reviewed and reconciled the allergies listed. CURRENT MEDICATIONS:   Prior to Admission medications    Medication Sig Start Date End Date Taking?  Authorizing Provider   methylPREDNISolone (MEDROL DOSEPACK) 4 MG tablet Take by mouth. 3/11/21 3/17/21 Yes Rachael Hernández MD   Cyanocobalamin (VITAMIN B-12) 1000 MCG extended release tablet Take 1,000 mcg by mouth daily   Yes Historical Provider, MD   Multiple Vitamins-Minerals (THERAPEUTIC MULTIVITAMIN-MINERALS) tablet Take 1 tablet by mouth daily   Yes Historical Provider, MD   ferrous sulfate (IRON 325) 325 (65 Fe) MG tablet Take 325 mg by mouth daily (with breakfast)   Yes Historical Provider, MD   Omega-3 Fatty Acids (FISH OIL) 1000 MG CAPS Take 3,000 mg by mouth 3 times daily   Yes Historical Provider, MD   Vitamin D 400 UNIT TABS tablet Take 5 tablets by mouth Daily with supper 6/10/20  Yes Chelo Armenta DO   furosemide (LASIX) 20 MG tablet Take 1 tablet by mouth daily 6/11/20  Yes RON Sun CNP   pantoprazole (PROTONIX) 20 MG tablet Take 1 tablet by mouth every morning (before breakfast) 6/11/20  Yes RON Sun CNP   lisinopril (PRINIVIL;ZESTRIL) 40 MG tablet Take 1 tablet by mouth nightly 6/10/20  Yes RON Sun CNP   tiotropium (SPIRIVA RESPIMAT) 2.5 MCG/ACT AERS inhaler Inhale 2 puffs into the lungs daily 6/11/20  Yes RON Sun CNP   montelukast (SINGULAIR) 10 MG tablet Take 1 tablet by mouth nightly 6/10/20  Yes RON Sun CNP   budesonide-formoterol (SYMBICORT) 80-4.5 MCG/ACT AERO Inhale 2 puffs into the lungs 2 times daily 6/10/20  Yes RON Mckinnon CNP   sotalol (BETAPACE) 120 MG tablet Take 120 mg by mouth 2 times daily   Yes Historical Provider, MD   hydroxychloroquine (PLAQUENIL) 200 MG tablet Take 100 mg by Copy of Card Obtained []? Yes   [x]? No obtained at consult               Last pacemaker check done 1/27/21     Gastrointestinal: No Problems     Genito-Urinary: No Problems                Breast: No Problems                Musculoskeletal: Joint Pain, mild left hip since fracture last year     Neurological: Tingling in fingers and feet                            Hematological and Lymphatic: Easy Bruising                Endocrine: No Problems      Gyn History:   No problems     A 10-point review of systems has been conducted and pertinent positives have been   recorded. All other review of systems are negative. ECOG PERFORMANCE STATUS: 2    VITAL SIGNS:   Vitals:    03/11/21 1036   BP: (!) 123/58   Pulse: 71   Resp: 18   Temp: 98.2 °F (36.8 °C)   SpO2: 95%   Weight: 137 lb (62.1 kg)       PHYSICAL EXAMINATION:  Constitutional: Elderly, No acute distress. awake, alert, cooperative in wheelchair on O2     HEENT:  Normocephalic, without obvious abnormality     NECK:  Supple, without supraclavicular or cervical adenopathy, thyroid symmetric, not enlarged     CARDIOVASCULAR:  Normal apical impulse, regular rate and rhythm, normal S1 and S2, no S3 or S4, and no murmur noted     LUNGS:   increased work of breathing, poor air exchange, + faint bilateral wheezing, on O2     ABDOMEN:  Nontender, nondistended, normal bowel sounds, soft, no masses, no hepatosplenomegally     EXTREMITIES: clubbing and 1+ bipedal  edema      STUDIES: I have personally reviewed the imaging, laboratory, and pathology studies as previously described. IMPRESSION/PLAN:    Suspect wither radiation pneumonitis vs aspiration given patient increase in cough and SOB, but she has a longstanding history of choking on food. DVH of treatment plan shows approx 800-1000cGy to area in anterior RUL of concern.  Will give trial medrol dose pack and return in 1 week,  Although I encouraged her to discuss swallowing studies with PCP  To determine if aspiration is present. Thank you for allowing us to participate in the care of this patient.   Sincerely,    Electronically signed by Rachael Hernández MD on 3/11/21 at 11:27 AM EST      cc:   MD Jose A Carlin DO Halbert Reasons  No address on file

## 2021-03-18 ENCOUNTER — HOSPITAL ENCOUNTER (OUTPATIENT)
Dept: RADIATION ONCOLOGY | Age: 86
Discharge: HOME | End: 2021-03-18
Attending: RADIOLOGY
Payer: MEDICARE

## 2021-03-18 VITALS
OXYGEN SATURATION: 95 % | DIASTOLIC BLOOD PRESSURE: 60 MMHG | RESPIRATION RATE: 16 BRPM | SYSTOLIC BLOOD PRESSURE: 128 MMHG | TEMPERATURE: 97.4 F | HEART RATE: 65 BPM

## 2021-03-18 PROCEDURE — 99212 OFFICE O/P EST SF 10 MIN: CPT | Performed by: RADIOLOGY

## 2021-03-18 RX ORDER — FLUTICASONE PROPIONATE 50 MCG
1 SPRAY, SUSPENSION (ML) NASAL DAILY
COMMUNITY

## 2021-03-18 NOTE — H&P
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 No fevers chills. She has severe COPD and is followed by pulmonology. She has a longstanding history of difficulty swallowing with food getting stuck, never been worked up. Recently switched PCPC. 6 w s/p treatment with followup CT scan. The report was not finalized but I personally compared current imaging to prior imaging at [facility] and treatment planning images. The treated tumor in the post RULnow significantly smaller than pretreatment. Anterior to this area there is a wide area of ground glass appearing lung with minimal nodularity. This is an odd location for radiation pneumonitis due to the low dose that was passing through this area and minimal changes c/w radiation pneumonitis posteriorly at the treatment target site. No other masses or enlarged LN noted. Final report now suggests esophageal  Thickening:    FINDINGS:           Right lung: Anterior right upper lobe pleural thickening (series 2, image 15, series 602, image 27),, with posterior right upper lobe nodular pleural thickening (series 2, 15, series 602, 30-33) identified. Bronchial wall thickening, right upper and    lower lung. Scarring versus subsegmental consolidation right lung base. No pleural effusion.       Left lung: Apical fibrotic change. Bronchial wall thickening, left lung. Scarring base lingula (series 602, image 52, series 2, image 49).  No nodules or masses visualized. No pleural effusion       Lymph nodes: No hilar, mediastinal, or axillary lymph node enlargement.       Thoracic aorta: Normal in course and caliber.       Cardiac Size: Normal. Pacemaker generator identified left upper chest with lead tips in region of right atrium and right ventricle.       Pericardial effusion: None.       Upper abdomen:Limited imaging upper abdomen shows diffuse esophageal wall thickening, caudal esophagus is proximal to and after gastroesophageal junction. Gallbladder surgically absent.       Musculoskeletal:No osteoblastic, and no osteolytic lesions.               Impression       Fibrotic change right upper lobe in patient with clinical history squamous cell carcinoma. No definite foci of metastatic change identified. If clinical concern warrants, PET/CT may be utilized for further evaluation.       Bronchiolitis.       Circumferential wall thickening caudal esophagus may be secondary to inflammatory etiology. Clinical correlation required to evaluate other etiologies including malignancy.       Cholecystectomy. PAST MEDICAL HISTORY:   Past Medical History:   Diagnosis Date    Anticoagulant long-term use     Atrial fibrillation (HCC)     Cancer (HCC)     RUL lung    COPD (chronic obstructive pulmonary disease) (HCC)     Hypertension     Lupus ([address]     Neuropathy     Osteoarthritis        PAST SURGICAL HISTORY:  Past Surgical History:   Procedure Laterality Date    APPENDECTOMY      CARDIAC PACEMAKER PLACEMENT      CHOLECYSTECTOMY      ELBOW SURGERY Left     FEMUR FRACTURE SURGERY Left 5/27/2020    INTRAMEDULLARY NAIL LEFT FEMUR performed by Hannah Rosado MD at [address] [place] [address]:   Allergies   Allergen Reactions    Atorvastatin      Other reaction(s): leg cramps    Lipitor [Atorvastatin Calcium]      Leg cramping        I have personally reviewed and reconciled the allergies listed.     CURRENT MEDICATIONS:   Prior to Admission medications    Medication Sig Start Date End Date Taking?  Authorizing Provider   dextromethorphan-guaiFENesin (MUCINEX DM)  MG per extended release tablet Take 1 tablet by mouth every 12 hours as needed   Yes Historical Provider, MD   fluticasone (FLONASE) 50 MCG/ACT nasal spray 1 spray by Each Nostril route daily   Yes Historical Provider, MD   Cyanocobalamin (VITAMIN B-12) 1000 MCG extended release tablet Take 1,000 mcg by mouth daily   Yes Historical Provider, MD   ferrous sulfate (IRON 325) 325 (65 Fe) MG tablet Take 325 mg by mouth daily (with breakfast)   Yes Historical Provider, MD   Omega-3 Fatty Acids (FISH OIL) 1000 MG CAPS Take 3,000 mg by mouth 3 times daily   Yes Historical Provider, MD   furosemide (LASIX) 20 MG tablet Take 1 tablet by mouth daily 6/11/20  Yes RON Siddiqui CNP   pantoprazole (PROTONIX) 20 MG tablet Take 1 tablet by mouth every morning (before breakfast) 6/11/20  Yes RON Siddiqui CNP   lisinopril (PRINIVIL;ZESTRIL) 40 MG tablet Take 1 tablet by mouth nightly 6/10/20  Yes RON Siddiqui CNP   tiotropium (SPIRIVA RESPIMAT) 2.5 MCG/ACT AERS inhaler Inhale 2 puffs into the lungs daily 6/11/20  Yes RON Siddiqui CNP   montelukast (SINGULAIR) 10 MG tablet Take 1 tablet by mouth nightly 6/10/20  Yes RON Siddiqui CNP   sotalol (BETAPACE) 120 MG tablet Take 120 mg by mouth 2 times daily   Yes Historical Provider, MD   aspirin 81 MG chewable tablet Take 81 mg by mouth daily   Yes Historical Provider, MD   potassium chloride (KLOR-CON M) 20 MEQ extended release tablet Take 20 mEq by mouth 2 times daily   Yes Historical Provider, MD   magnesium oxide (MAG-OX) 400 MG tablet Take 400 mg by mouth daily   Yes Historical Provider, MD   hydroCHLOROthiazide (MICROZIDE) 12.5 MG capsule Take 12.5 mg by mouth daily   Yes Historical Provider, MD   albuterol (PROVENTIL) (2.5 MG/3ML) 0.083% nebulizer solution Take 2.5 mg by nebulization every 6 hours as needed for Wheezing   Yes Historical Provider, MD   nitroGLYCERIN (NITROSTAT) 0.4 MG SL tablet Place 0.4 mg under the tongue every 5 minutes as needed for Chest pain up to max of 3 total doses. If no relief after 1 dose, call 911. Historical Provider, MD   warfarin (COUMADIN) 4 MG tablet Take 1 tablet by mouth Once for 1 dose  Patient taking differently: Take 6 mg by mouth Once  6/10/20 1/18/21  RON Kwon - CNP       I have personally reviewed and reconciled the medications listed. FAMILY HISTORY:   Family History   Problem Relation Age of Onset    Cancer Sister        SOCIAL HISTORY:   Social History     Tobacco Use   Smoking Status Former Smoker    Types: Cigarettes    Quit date: 37    Years since quittin.2   Smokeless Tobacco Never Used     Social History     Substance and Sexual Activity   Alcohol Use Not Currently       Review Of Systems:     Pain Score:   Pain Score (1-10): none    General: feels less fatigued     Eyes (Ophthalmic): dry left eye                Skin (Dermatological): No Problems                ENT: No Problems                Respiratory: Cough, SOB, LAZO and Oxygen  Using 3l of oxygen a couple times in the day time this week, less coughing, CPAP at hs          Cardiovascular: Edema deet                            Device   [x]? Yes   []? No              Copy of Card Obtained []? Yes   [x]? No     Gastrointestinal: No Problems     Genito-Urinary: No Problems                Breast: No Problems                Musculoskeletal: Joint Pain, left hip     Neurological: Numbness and Tingling feet on occasion                            Hematological and Lymphatic: Easy Bruising                Endocrine: No Problems     A 10-point review of systems has been conducted and pertinent positives have been   recorded. All other review of systems are negative.        ECOG PERFORMANCE STATUS: 2    VITAL SIGNS:   Vitals:    21 1128   BP: 128/60   Pulse: 65   Resp: 16   Temp: 97.4 °F (36.3 °C) SpO2: 95%       PHYSICAL EXAMINATION:  Constitutional: Elderly, No acute distress. awake, alert, cooperative in wheelchair on O2     HEENT:  Normocephalic, without obvious abnormality     NECK:  Supple, without supraclavicular or cervical adenopathy, thyroid symmetric, not enlarged     CARDIOVASCULAR:  Normal apical impulse, regular rate and rhythm, normal S1 and S2, no S3 or S4, and no murmur noted     LUNGS:   increased work of breathing, poor air exchange, + faint bilateral wheezing, on O2     ABDOMEN:  Nontender, nondistended, normal bowel sounds, soft, no masses, no hepatosplenomegally     EXTREMITIES: clubbing and 1+ bipedal  edema      IMPRESSION/PLAN:    I spent 15 minutes face to face with the patient . The patient is doing much better after medrol dose pack probably related to improvement in  COPD exacerbation (as this is an odd location for radiation pneumonitis). She is to see her pulmonologist Dr Germain Shone in the near future. I will see her back after PETCT at the end of April. If FGD avid esophagus will refer for egd, in the interim she might benefit from swallowing study and I will discuss with PCP            Thank you for allowing us to participate in the care of this patient.   Sincerely,    Electronically signed by Harjeet Miller MD on 3/18/21 at 12:15 PM EDT      cc:   MD Gregorio Salazar DO Helayne Baumgartner  No address on file

## 2021-03-18 NOTE — PROGRESS NOTES
NURSING ASSESSMENT     Date: 3/18/2021        Patient Name: Mary Ellen Dee     YOB: 1935      Age:  81 y.o. MRN: [id number]     Chaperone [] Yes   [x] No      Pain Score:   Pain Score (1-10): none    General: feels less fatigued    Eyes (Ophthalmic): dry left eye     Skin (Dermatological): No Problems     ENT: No Problems     Respiratory: Cough, SOB, LAZO and Oxygen  Using 3l of oxygen a couple times in the day time this week, less coughing, CPAP at hs   Cardiovascular: Edema deet      Device   [x] Yes   [] No   Copy of Card Obtained [] Yes   [x] No    Gastrointestinal: No Problems    Genito-Urinary: No Problems     Breast: No Problems     Musculoskeletal: Joint Pain, left hip    Neurological: Numbness and Tingling feet on occasion      Hematological and Lymphatic: Easy Bruising     Endocrine: No Problems       A 10-point review of systems has been conducted and pertinent positives have been   recorded. All other review of systems are negative    Was the patient admitted during the course of treatment OR within 30 days of treatment?  no

## 2021-04-22 ENCOUNTER — HOSPITAL ENCOUNTER (OUTPATIENT)
Dept: CT IMAGING | Age: 86
Discharge: HOME | End: 2021-04-24
Payer: MEDICARE

## 2021-04-22 DIAGNOSIS — C34.11 MALIGNANT NEOPLASM OF UPPER LOBE OF RIGHT LUNG (HCC): ICD-10-CM

## 2021-04-22 PROCEDURE — 78815 PET IMAGE W/CT SKULL-THIGH: CPT

## 2021-04-22 PROCEDURE — A9552 F18 FDG: HCPCS | Performed by: RADIOLOGY

## 2021-04-22 PROCEDURE — 3430000000 HC RX DIAGNOSTIC RADIOPHARMACEUTICAL: Performed by: RADIOLOGY

## 2021-04-22 RX ORDER — FLUDEOXYGLUCOSE F 18 200 MCI/ML
17.3 INJECTION, SOLUTION INTRAVENOUS
Status: COMPLETED | OUTPATIENT
Start: 2021-04-22 | End: 2021-04-22

## 2021-04-22 RX ADMIN — FLUDEOXYGLUCOSE F 18 17.3 MILLICURIE: 200 INJECTION, SOLUTION INTRAVENOUS at 09:43

## 2021-04-26 ENCOUNTER — HOSPITAL ENCOUNTER (OUTPATIENT)
Dept: RADIATION ONCOLOGY | Age: 86
Discharge: HOME | End: 2021-04-26
Attending: RADIOLOGY
Payer: MEDICARE

## 2021-04-26 VITALS
SYSTOLIC BLOOD PRESSURE: 145 MMHG | BODY MASS INDEX: 22.3 KG/M2 | WEIGHT: 134 LBS | RESPIRATION RATE: 18 BRPM | OXYGEN SATURATION: 96 % | DIASTOLIC BLOOD PRESSURE: 61 MMHG | HEART RATE: 71 BPM | TEMPERATURE: 95.7 F

## 2021-04-26 PROCEDURE — 99212 OFFICE O/P EST SF 10 MIN: CPT | Performed by: RADIOLOGY

## 2021-04-26 NOTE — PROGRESS NOTES
NURSING ASSESSMENT     Date: 4/26/2021        Patient Name: David Henderson     YOB: 1935      Age:  81 y.o. MRN: [id number]     Chaperone [] Yes   [] No      Pain Score:   Pain Score (1-10): 0       Is pain affecting your ability to take care of yourself or move throughout your home? [] Yes   [x] No  General:   Patient has gained weight [] Yes   [x] No  Patient has lost weight [] Yes   [x] No  How much weight in pounds and over what length of time:     Eyes (Ophthalmic): denies   Skin (Dermatological): clear   ENT: eating well- good appetite- chews food well- does cough harshly at times and vomits her pills- using mucin ex every other day to thin secretions- using the 12 hour release. Respiratory: wears 2 liters  oxygen as needed when ambulating- sleeps with CPAP and oxygen. Moist cough but lungs sound clear.      Cardiovascular: pacemaker      Device   [x] Yes   [] No   Copy of Card Obtained [] Yes   [x] No    Gastrointestinal: denies  Genito-Urinary: denies   Breast: denies   Musculoskeletal: wheelchair or safety  Neurological: denies    Hematological and Lymphatic: denie   Endocrine: denies  Gyn History: denies    Additional Comments: Here for PET results

## 2021-04-26 NOTE — H&P
RADIATION FOLLOW UP NOTE  DATE OF VISIT: 4/26/2021    DIAGNOSIS & STAGING: stage 1A2 [id number]  NSCLC 1.7 cm RUL SCCA      PREVIOUS TREATMENT:   Treatment Dates:                    1/18, 1/20, 1/25 and 1/27/21     Site: Dose: Total # fractions: Dose per fraction: Energy: Technique Isodose Line:   RUL  50 Gy 4 12.5 Gy 6FFF MV photons VMAT/IMRT/IGRT 90.3%      Concurrent therapy:             none     CURRENT COMPLAINT: Here today for results of PET scan 3 months after treatment    INTERVAL HISTORY: I saw Rivka Oliver last on 3/18 2021 when she was complaining of  increased cough and LAZO. She had a CT (6 weeks after completion)     FINDINGS:           Right lung: Anterior right upper lobe pleural thickening (series 2, image 15, series 602, image 27),, with posterior right upper lobe nodular pleural thickening (series 2, 15, series 602, 30-33) identified. Bronchial wall thickening, right upper and    lower lung. Scarring versus subsegmental consolidation right lung base. No pleural effusion.       Left lung: Apical fibrotic change. Bronchial wall thickening, left lung. Scarring base lingula (series 602, image 52, series 2, image 49). No nodules or masses visualized. No pleural effusion       Lymph nodes: No hilar, mediastinal, or axillary lymph node enlargement.       Thoracic aorta: Normal in course and caliber.       Cardiac Size: Normal. Pacemaker generator identified left upper chest with lead tips in region of right atrium and right ventricle.       Pericardial effusion: None.       Upper abdomen:Limited imaging upper abdomen shows diffuse esophageal wall thickening, caudal esophagus is proximal to and after gastroesophageal junction. Gallbladder surgically absent.       Musculoskeletal:No osteoblastic, and no osteolytic lesions.               Impression       Fibrotic change right upper lobe in patient with clinical history squamous cell carcinoma. No definite foci of metastatic change identified.  If clinical concern warrants, PET/CT may be utilized for further evaluation.       Bronchiolitis.       Circumferential wall thickening caudal esophagus may be secondary to inflammatory etiology. Clinical correlation required to evaluate other etiologies including malignancy.       Cholecystectomy. She was placed on Medrol dose pack at the time  for what was thought to be COPD exacerbation. She got better josue recently ()last week) had another flare requiring O2 supplementation. She states she is better now and no longer needs O2. Due to radiographic findings and esophageal thickening I asked her PCP to do a swallowing study ([facility]). There are no records stating that this was done. My last note states that if FGD avid esophagus will refer for egd, in the interim she might benefit from swallowing study and I will discuss with PCP. She is here today for results of PET 4/22/21. COMPARISON FDG PET/CT: NONE       FINDINGS:        NECK: Mild artifact uptake. No pathologic activity.           CHEST:The area of abnormal density with scarlike changes in the anterior right upper lobe is FDG avid with maximum SUV of 4.6. . Findings suggestive of tumor recurrence. There is some patchy mild increased FDG activity in the posterior right upper lobe    corresponding to areas of irregular nodularity with maximum SUV of 2.8.       There is a focus of avid FDG activity corresponding to an 8 mm nodule in the left lingula with maximum SUV of 6.8. This is suspicious for a metastatic lesion.       Mild nonspecific FDG activity in the right hilum with maximum SUV of 2.7, but no definite malignant hilar lymph node activity.           ABDOMEN-PELVIS The distribution of FDG throughout the abdomen and pelvis is within normal limits.  Physiologic activity is noted in the liver, renal collecting systems, bladder and GI tract.           BONES:There is normal distribution of FDG  throughout the bony skeleton with no signs of metastatic bone disease.                                 Impression   INCREASED FDG ACTIVITY IN THE RIGHT UPPER LOBE AND LEFT LINGULA CONSISTENT WITH AREAS OF MALIGNANCY. NO DEFINITE MEDIASTINAL ADENOPATHY. NO DISTANT METASTATIC DISEASE. PAST MEDICAL HISTORY:   Past Medical History:   Diagnosis Date    Anticoagulant long-term use     Atrial fibrillation (HCC)     Cancer (HCC)     RUL lung    COPD (chronic obstructive pulmonary disease) (HCC)     Hypertension     Lupus ([address]     Neuropathy     Osteoarthritis        PAST SURGICAL HISTORY:  Past Surgical History:   Procedure Laterality Date    APPENDECTOMY      CARDIAC PACEMAKER PLACEMENT      CHOLECYSTECTOMY      ELBOW SURGERY Left     FEMUR FRACTURE SURGERY Left 5/27/2020    INTRAMEDULLARY NAIL LEFT FEMUR performed by Kiya Leblanc MD at [facility]:   Allergies   Allergen Reactions    Atorvastatin      Other reaction(s): leg cramps    Lipitor [Atorvastatin Calcium]      Leg cramping        I have personally reviewed and reconciled the allergies listed. CURRENT MEDICATIONS:   Prior to Admission medications    Medication Sig Start Date End Date Taking?  Authorizing Provider   fluticasone (FLONASE) 50 MCG/ACT nasal spray 1 spray by Each Nostril route daily   Yes Historical Provider, MD   Cyanocobalamin (VITAMIN B-12) 1000 MCG extended release tablet Take 1,000 mcg by mouth daily   Yes Historical Provider, MD   ferrous sulfate (IRON 325) 325 (65 Fe) MG tablet Take 325 mg by mouth daily (with breakfast)   Yes Historical Provider, MD   Omega-3 Fatty Acids (FISH OIL) 1000 MG CAPS Take 3,000 mg by mouth 3 times daily   Yes Historical Provider, MD   furosemide (LASIX) 20 MG tablet Take 1 tablet by mouth daily 6/11/20  Yes RON Hogan CNP   pantoprazole (PROTONIX) 20 MG tablet Take 1 tablet by mouth every morning (before breakfast) 6/11/20  Yes RON Hogan CNP   lisinopril (PRINIVIL;ZESTRIL) 40 MG tablet Take 1 tablet by mouth nightly 6/10/20  Yes Marizol Lau APRN - CNP   tiotropium (SPIRIVA RESPIMAT) 2.5 MCG/ACT AERS inhaler Inhale 2 puffs into the lungs daily 20  Yes Marizol Lau APRN - CNP   montelukast (SINGULAIR) 10 MG tablet Take 1 tablet by mouth nightly 6/10/20  Yes Marizol Lau APRN - CNP   sotalol (BETAPACE) 120 MG tablet Take 120 mg by mouth 2 times daily   Yes Historical Provider, MD   aspirin 81 MG chewable tablet Take 81 mg by mouth daily   Yes Historical Provider, MD   potassium chloride (KLOR-CON M) 20 MEQ extended release tablet Take 20 mEq by mouth 2 times daily   Yes Historical Provider, MD   magnesium oxide (MAG-OX) 400 MG tablet Take 400 mg by mouth daily   Yes Historical Provider, MD   hydroCHLOROthiazide (MICROZIDE) 12.5 MG capsule Take 12.5 mg by mouth daily   Yes Historical Provider, MD   albuterol (PROVENTIL) (2.5 MG/3ML) 0.083% nebulizer solution Take 2.5 mg by nebulization every 6 hours as needed for Wheezing   Yes Historical Provider, MD   dextromethorphan-guaiFENesin (MUCINEX DM)  MG per extended release tablet Take 1 tablet by mouth every 12 hours as needed    Historical Provider, MD   nitroGLYCERIN (NITROSTAT) 0.4 MG SL tablet Place 0.4 mg under the tongue every 5 minutes as needed for Chest pain up to max of 3 total doses. If no relief after 1 dose, call 911. Historical Provider, MD   warfarin (COUMADIN) 4 MG tablet Take 1 tablet by mouth Once for 1 dose  Patient taking differently: Take 6 mg by mouth Once  6/10/20 1/18/21  Marizol Lau APRN - CNP       I have personally reviewed and reconciled the medications listed.     FAMILY HISTORY:   Family History   Problem Relation Age of Onset    Cancer Sister        SOCIAL HISTORY:   Social History     Tobacco Use   Smoking Status Former Smoker    Types: Cigarettes    Quit date: 37    Years since quittin.3   Smokeless Tobacco Never Used     Social History     Substance and Sexual Activity   Alcohol Use Not Currently       Review Of Systems:   Pain Score:   Pain Score (1-10): 0     Is pain affecting your ability to take care of yourself or move throughout your home? []? Yes   [x]? No  General:   Patient has gained weight []? Yes   [x]? No  Patient has lost weight []? Yes   [x]? No  How much weight in pounds and over what length of time:      Eyes (Ophthalmic): denies     Skin (Dermatological): clear   ENT: eating well- good appetite- chews food well- does cough harshly at times and vomits her pills- using mucin ex every other day to thin secretions- using the 12 hour release. Respiratory: wears 2 liters  oxygen as needed when ambulating- sleeps with CPAP and oxygen. Moist cough but lungs sound clear. Cardiovascular: pacemaker                            Device   [x]? Yes   []? No              Copy of Card Obtained []? Yes   [x]? No     Gastrointestinal: denies  Genito-Urinary: denies            Breast: denies              Musculoskeletal: wheelchair or safety  Neurological: denies               Hematological and Lymphatic: denie           Endocrine: denies  Gyn History: denies    A 10-point review of systems has been conducted and pertinent positives have been   recorded. All other review of systems are negative. ECOG PERFORMANCE STATUS: 2    VITAL SIGNS:   Vitals:    04/26/21 1030   BP: (!) 145/61   Pulse: 71   Resp: 18   Temp: 95.7 °F (35.4 °C)   SpO2: 96%   Weight: 134 lb (60.8 kg)         PHYSICAL EXAMINATION:  Constitutional: Elderly, No acute distress.  awake, alert, cooperative in wheelchair      HEENT:  Normocephalic, without obvious abnormality     NECK:  Supple, without supraclavicular or cervical adenopathy, thyroid symmetric, not enlarged     CARDIOVASCULAR:  Normal apical impulse, regular rate and rhythm, normal S1 and S2, no S3 or S4, and no murmur noted     LUNGS:   increased work of breathing, poor air exchange, + faint bilateral wheezing     ABDOMEN:  Nontender, nondistended, normal bowel sounds, soft, no masses, no hepatosplenomegally     EXTREMITIES: clubbing and 1+ bipedal  edema      STUDIES: I have personally reviewed the clinical documentation, imaging, laboratory, and pathology studies as previously described and detailed above. IMPRESSION/PLAN:    I spent 20 minutes face to face with the patient and 15 minutes reviewing the clinical documentation, and radiographic studies today. Recent COPD exacerbation requiring O2 supplement, now better. PET imaging today is consistent with treatment response in the RUL in the area of SBRT for Stage 1A SCCA NSCLC. Unfortunately the pet was not compared to pretreatment imaging. The new area of FDG uptake may well represent a new cancer, unlikely metastatic. This area is new and there was not FDG uptake on 11/20 PET scan form outside facility. The patient has a history of SCCA and basal cell of the skin s/p mohs and there are no other areas of abnormal uptake elsewhere on current scan. She does have lupus and suspects that she aspirates so inflammatory and infectious nodule may also be considered. In fact after reviewing the previous CT from march I referred her back to [facility] PCP (Dr Zahira Canales) for swallowing study due to thickening in esophagus (thought not to be cancer related). I do not see that this was done in the EMR. The esophagus is NOT hypermetabolic on current scan. I have recommended biopsy for further characterization or the new lung nodule AND repeat CT in 6-8 weeks. As I am leaving this practice I will not be ordering these tests and she will see Dr Merlin Casas in the near future. He will review results and manage care. Thank you for allowing us to participate in the care of this patient.   Sincerely,    Electronically signed by Edgard Marcus MD on 4/26/21 at 11:14 AM EDT      cc:   MD Krystal Kumar DO [facility]  No address on file

## 2021-04-28 ENCOUNTER — OFFICE VISIT (OUTPATIENT)
Dept: PULMONOLOGY | Age: 86
End: 2021-04-28
Payer: MEDICARE

## 2021-04-28 VITALS
HEIGHT: 66 IN | TEMPERATURE: 96.1 F | DIASTOLIC BLOOD PRESSURE: 60 MMHG | SYSTOLIC BLOOD PRESSURE: 108 MMHG | HEART RATE: 69 BPM | BODY MASS INDEX: 21.57 KG/M2 | OXYGEN SATURATION: 93 % | WEIGHT: 134.2 LBS | RESPIRATION RATE: 16 BRPM

## 2021-04-28 DIAGNOSIS — I27.20 PULMONARY HYPERTENSION (HCC): ICD-10-CM

## 2021-04-28 DIAGNOSIS — R91.1 NODULE OF LOWER LOBE OF LEFT LUNG: Primary | ICD-10-CM

## 2021-04-28 DIAGNOSIS — J44.9 CHRONIC OBSTRUCTIVE PULMONARY DISEASE, UNSPECIFIED COPD TYPE (HCC): ICD-10-CM

## 2021-04-28 DIAGNOSIS — J96.11 CHRONIC RESPIRATORY FAILURE WITH HYPOXIA (HCC): ICD-10-CM

## 2021-04-28 DIAGNOSIS — G47.33 OBSTRUCTIVE SLEEP APNEA (ADULT) (PEDIATRIC): ICD-10-CM

## 2021-04-28 PROCEDURE — G8400 PT W/DXA NO RESULTS DOC: HCPCS | Performed by: INTERNAL MEDICINE

## 2021-04-28 PROCEDURE — G8926 SPIRO NO PERF OR DOC: HCPCS | Performed by: INTERNAL MEDICINE

## 2021-04-28 PROCEDURE — 4040F PNEUMOC VAC/ADMIN/RCVD: CPT | Performed by: INTERNAL MEDICINE

## 2021-04-28 PROCEDURE — 99214 OFFICE O/P EST MOD 30 MIN: CPT | Performed by: INTERNAL MEDICINE

## 2021-04-28 PROCEDURE — 1090F PRES/ABSN URINE INCON ASSESS: CPT | Performed by: INTERNAL MEDICINE

## 2021-04-28 PROCEDURE — 3023F SPIROM DOC REV: CPT | Performed by: INTERNAL MEDICINE

## 2021-04-28 PROCEDURE — G8427 DOCREV CUR MEDS BY ELIG CLIN: HCPCS | Performed by: INTERNAL MEDICINE

## 2021-04-28 PROCEDURE — 1123F ACP DISCUSS/DSCN MKR DOCD: CPT | Performed by: INTERNAL MEDICINE

## 2021-04-28 PROCEDURE — G8420 CALC BMI NORM PARAMETERS: HCPCS | Performed by: INTERNAL MEDICINE

## 2021-04-28 PROCEDURE — 1036F TOBACCO NON-USER: CPT | Performed by: INTERNAL MEDICINE

## 2021-04-28 RX ORDER — HYDROXYCHLOROQUINE SULFATE 200 MG/1
TABLET, FILM COATED ORAL
COMMUNITY
Start: 2021-04-22

## 2021-04-28 NOTE — PROGRESS NOTES
Subjective:     Yaritza Scott is a 81 y.o. female who complains today of:     Chief Complaint   Patient presents with    New Patient     Lung Nodule       HPI  Patient presents for lung nodule PET positive left lower lobe    Patient referred from Dr. Dean Olguin office for PET positive left lower lobe nodule patient was diagnosed with non-small cell lung cancer November of last year she underwent for radiation treatment, she did not undergo chemotherapy due to comorbidities, she had a PET scan done recently showing PET positive left lower lobe nodule she does have residual PET activity at the old cancer site which likely postradiation effect, the nodule in the left lower lobe is new compared to CAT scan from March and the PET scan in November. Patient does have history of cough which is at baseline, comes and goes, occasionally with yellow phlegm, no hemoptysis, no chest pain, no fever no chills, no lower extremity edema she is on oxygen at home she uses as needed mainly with symptoms of shortness of breath, she has CPAP at home and she is compliant with the treatment            Allergies:  Atorvastatin and Lipitor [atorvastatin calcium]  Past Medical History:   Diagnosis Date    Anticoagulant long-term use     Atrial fibrillation ([address]     Cancer ([address]     RUL lung    COPD (chronic obstructive pulmonary disease) ([address]     Hypertension     Lupus ([address]     Neuropathy     Osteoarthritis     Sleep apnea      Past Surgical History:   Procedure Laterality Date    APPENDECTOMY      CARDIAC PACEMAKER PLACEMENT      CHOLECYSTECTOMY      ELBOW SURGERY Left     FEMUR FRACTURE SURGERY Left 5/27/2020    INTRAMEDULLARY NAIL LEFT FEMUR performed by Kiya [facility]blanc [facility]     Family History   Problem Relation Age of Onset    Cancer Sister      Social History     Socioeconomic History    Marital status:       Spouse name: Not on file    Number of children: Not on file    Years of education: Not on file    Highest education level: Not on file   Occupational History    Not on file   Social Needs    Financial resource strain: Not hard at all    Food insecurity     Worry: Never true     Inability: Never true   [employer] needs     Medical: No     Non-medical: No   Tobacco Use    Smoking status: Former Smoker     Types: Cigarettes     Quit date:      Years since quittin.3    Smokeless tobacco: Never Used   Substance and Sexual Activity    Alcohol use: Not Currently    Drug use: Never    Sexual activity: Not Currently     Partners: Male   Lifestyle    Physical activity     Days per week: 0 days     Minutes per session: 0 min    Stress: Only a little   Relationships    Social connections     Talks on phone: More than three times a week     Gets together: Once a week     Attends Hinduism service: 1 to 4 times per year     Active member of club or organization: No     Attends meetings of clubs or organizations: Never     Relationship status:      Intimate partner violence     Fear of current or ex partner: No     Emotionally abused: No     Physically abused: No     Forced sexual activity: No   Other Topics Concern    Not on file   Social History Narrative         Lives With: Family    Type of Home: [id number] [facility] [id number] [place]: Two level, Bed/Bath upstairs    Bathroom Shower/Tub: Tub/Shower unit    Bathroom Equipment: Shower chair, Grab bars in [address] [place]: 4 wheeled walker    ADL Assistance: Independent    Homemaking Assistance: Independent    Homemaking Responsibilities: Yes    Laundry Responsibility: Primary    Cleaning Responsibility: (Pt completes dishes and light homemaking and completes her own laundry management)    Ambulation Assistance: Independent    Transfer Assistance: Independent    Additional Comments: wears 02 at night         Review of Systems      ROS: 10 organs review of system is done including general, psychological, ENT, 4 MG tablet Take 1 tablet by mouth Once for 1 dose (Patient taking differently: Take 6 mg by mouth Once ) 30 tablet 3    montelukast (SINGULAIR) 10 MG tablet Take 1 tablet by mouth nightly (Patient not taking: Reported on 4/28/2021) 30 tablet 3     No current facility-administered medications for this visit. Objective:     Vitals:    04/28/21 1315   BP: 108/60   Site: Right Upper Arm   Position: Sitting   Cuff Size: Medium Adult   Pulse: 69   Resp: 16   Temp: 96.1 °F (35.6 °C)   TempSrc: Tympanic   SpO2: 93%   Weight: 134 lb 3.2 oz (60.9 kg)   Height: 5' 6\" (1.676 m)         Physical Exam  Constitutional:       General: She is not in acute distress. Appearance: She is well-developed. She is not diaphoretic. HENT:      Head: Normocephalic and atraumatic. Eyes:      Conjunctiva/sclera: Conjunctivae normal.      Pupils: Pupils are equal, round, and reactive to light. Neck:      Musculoskeletal: Normal range of motion and neck supple. Cardiovascular:      Rate and Rhythm: Normal rate and regular rhythm. Heart sounds: No murmur. No friction rub. No gallop. Pulmonary:      Effort: Pulmonary effort is normal. No respiratory distress. Breath sounds: Normal breath sounds. No wheezing or rales. Chest:      Chest wall: No tenderness. Abdominal:      General: There is no distension. Palpations: Abdomen is soft. Tenderness: There is no abdominal tenderness. There is no rebound. Musculoskeletal:         General: No tenderness. Right lower leg: No edema. Left lower leg: No edema. Lymphadenopathy:      Cervical: No cervical adenopathy. Skin:     General: Skin is warm and dry. Findings: No erythema. Neurological:      Mental Status: She is alert and oriented to person, place, and time.    Psychiatric:         Judgment: Judgment normal.         Imaging studies reviewed by me PET CT scan reviewed with the patient and her daughter, PET positive left lower lobe nodule at the most inferior location of the inferior lingula, she has some PET activity at the old cancer area likely residual radiation treatment. Lab results reviewed in chart  PFT records from [facility] reviewed Per note from [facility] patient's FEV1 36% and DLCO 33%  ECHO: May 2020, EF 60%, RVSP 36  Obstructive sleep apnea on CPAP 9 cm H2O via nasal pillows  Assessment and Plan       Diagnosis Orders   1. Nodule of lower lobe of left lung  Raisa Gonzalez MD, Interventional Radiology, [place]    CT CHEST WO CONTRAST   2. Chronic obstructive pulmonary disease, unspecified COPD type ([address]     3. Obstructive sleep apnea (adult) (pediatric)     4. Chronic respiratory failure with hypoxia (HCC)     5. Pulmonary hypertension (HCC)       · PET positive left lower lobe lung nodule, with history of non-small cell lung cancer, concerning for malignancy discussed with the patient option to proceed with CT-guided biopsy, if negative will need follow-up CAT scan in 4 to 6 weeks if persisted we can try navigational bronchoscopy though this is not easy to get to. If both procedures were nondiagnostic and nodule persisted or increase in size will need to consider radiation treatment of presumptive diagnosis of malignancy. Patient is not a candidate for surgical biopsy. Patient follows up with Dr. Nerissa Tapia at [facility] I did discuss with her that she probably better discussed with her primary pulmonologist he might prefer to do procedures and follow-up on his own I left the patient option to either go back to Dr. Nerissa Tapia in regard to this nodule or follow-up with me.   · Severe COPD, patient follows up with Dr. Nerissa Tapia  · Obstructive sleep apnea continue CPAP and she is compliant  · Continue O2 maintain sat 88 to 90%  · Pulmonary hypertension likely group 3      Orders Placed This Encounter   Procedures    CT CHEST WO CONTRAST     Standing Status:   Future     Standing Expiration Date:   4/28/2022     Order Specific Question:   Reason for exam: Answer:   lung nodul;e   Sushil Stewart MD, Interventional Radiology, [facility]     Referral Priority:   Routine     Referral Type:   Eval and Treat     Referral Reason:   Specialty Services Required     Requested Specialty:   Radiology     Number of Visits Requested:   1     No orders of the defined types were placed in this encounter. Discussed with patient the importance of exercise and weight control and  overall health and well-being. Reviewed with the patient: current clinical status, medications, activities and diet. Side effects, adverse effects of the medication prescribed today, as well as treatment plan and result expectations have been discussed with the patient who expresses understanding and desires to proceed. Return in about 4 weeks (around 5/26/2021).       Mariposa Richards MD

## 2021-05-06 ENCOUNTER — INITIAL CONSULT (OUTPATIENT)
Dept: INTERVENTIONAL RADIOLOGY/VASCULAR | Age: 86
End: 2021-05-06
Payer: MEDICARE

## 2021-05-06 VITALS
BODY MASS INDEX: 21.53 KG/M2 | SYSTOLIC BLOOD PRESSURE: 118 MMHG | DIASTOLIC BLOOD PRESSURE: 62 MMHG | RESPIRATION RATE: 12 BRPM | WEIGHT: 134 LBS | OXYGEN SATURATION: 96 % | HEART RATE: 76 BPM | HEIGHT: 66 IN

## 2021-05-06 DIAGNOSIS — R94.2 ABNORMAL PET SCAN OF LUNG: ICD-10-CM

## 2021-05-06 DIAGNOSIS — C34.91 SCC OF LUNG (SMALL CELL CARCINOMA), RIGHT (HCC): ICD-10-CM

## 2021-05-06 DIAGNOSIS — R91.1 NODULE OF LOWER LOBE OF LEFT LUNG: Primary | ICD-10-CM

## 2021-05-06 PROCEDURE — G8400 PT W/DXA NO RESULTS DOC: HCPCS | Performed by: NURSE PRACTITIONER

## 2021-05-06 PROCEDURE — 1123F ACP DISCUSS/DSCN MKR DOCD: CPT | Performed by: NURSE PRACTITIONER

## 2021-05-06 PROCEDURE — 1036F TOBACCO NON-USER: CPT | Performed by: NURSE PRACTITIONER

## 2021-05-06 PROCEDURE — 1090F PRES/ABSN URINE INCON ASSESS: CPT | Performed by: NURSE PRACTITIONER

## 2021-05-06 PROCEDURE — G8427 DOCREV CUR MEDS BY ELIG CLIN: HCPCS | Performed by: NURSE PRACTITIONER

## 2021-05-06 PROCEDURE — 99204 OFFICE O/P NEW MOD 45 MIN: CPT | Performed by: NURSE PRACTITIONER

## 2021-05-06 PROCEDURE — G8420 CALC BMI NORM PARAMETERS: HCPCS | Performed by: NURSE PRACTITIONER

## 2021-05-06 PROCEDURE — 4040F PNEUMOC VAC/ADMIN/RCVD: CPT | Performed by: NURSE PRACTITIONER

## 2021-05-06 ASSESSMENT — ENCOUNTER SYMPTOMS
WHEEZING: 1
SORE THROAT: 0
NAUSEA: 0
EYES NEGATIVE: 1
ABDOMINAL PAIN: 0
CONSTIPATION: 0
VOMITING: 0
DIARRHEA: 0
SHORTNESS OF BREATH: 1
COUGH: 1
TROUBLE SWALLOWING: 0
ABDOMINAL DISTENTION: 0

## 2021-05-06 NOTE — PROGRESS NOTES
Noa Wagner, a female of 81 y.o. came to the office 2021. Chief Complaint   Patient presents with   [address]     referral from Dr. Stephanie Walden for left lung nodule       HPI: Noa Wagner is a pleasant female who presents to clinic for consultation at the request of pulmonology for evaluation of CT Guided needle biopsy for left LL lung nodule with hypermetabolic activity on PET scan. H/O right squamous cell lung cancer. Anticoagulant for H/O PPM placed . Dr. Arnol Vargas is EP at [address]. Symptoms include: chronic short of breath with exertion, chronic dry cough, wheezing, sinus and chest congestion, fatigue, lightheadedness. Family History   Problem Relation Age of Onset    Cancer Sister        Past Surgical History:   Procedure Laterality Date    APPENDECTOMY      CARDIAC PACEMAKER PLACEMENT      CHOLECYSTECTOMY      ELBOW SURGERY Left     FEMUR FRACTURE SURGERY Left 2020    INTRAMEDULLARY NAIL LEFT FEMUR performed by Juliann Duarte MD at [facility]        Past Medical History:   Diagnosis Date    Anticoagulant long-term use     Atrial fibrillation ([address]     [facility]     RUL lung    COPD (chronic obstructive pulmonary disease) ([address]     Hypertension     Lup[facility]     Neuropathy     Osteoarthritis     Sleep apnea        Social History     Socioeconomic History    Marital status:       Spouse name: None    Number of children: None    Years of education: None    Highest education level: None   Occupational History    None   Social Needs    Financial resource strain: Not hard at all   [employer] insecurity     Worry: Never true     Inability: Never true   [employer] needs     Medical: No     Non-medical: No   Tobacco Use    Smoking status: Former Smoker     Types: Cigarettes     Quit date: 1980     Years since quittin.3    Smokeless tobacco: Never Used   Substance and Sexual Activity    Alcohol use: Not Currently    Drug use: Never    325 (65 Fe) MG tablet Take 325 mg by mouth daily (with breakfast)      Omega-3 Fatty Acids (FISH OIL) 1000 MG CAPS Take 3,000 mg by mouth 3 times daily      nitroGLYCERIN (NITROSTAT) 0.4 MG SL tablet Place 0.4 mg under the tongue every 5 minutes as needed for Chest pain up to max of 3 total doses. If no relief after 1 dose, call 911.  furosemide (LASIX) 20 MG tablet Take 1 tablet by mouth daily 60 tablet 3    pantoprazole (PROTONIX) 20 MG tablet Take 1 tablet by mouth every morning (before breakfast) 30 tablet 3    lisinopril (PRINIVIL;ZESTRIL) 40 MG tablet Take 1 tablet by mouth nightly 30 tablet 3    tiotropium (SPIRIVA RESPIMAT) 2.5 MCG/ACT AERS inhaler Inhale 2 puffs into the lungs daily 1 Inhaler 1    montelukast (SINGULAIR) 10 MG tablet Take 1 tablet by mouth nightly 30 tablet 3    sotalol (BETAPACE) 120 MG tablet Take 120 mg by mouth 2 times daily      aspirin 81 MG chewable tablet Take 81 mg by mouth daily      potassium chloride (KLOR-CON M) 20 MEQ extended release tablet Take 20 mEq by mouth 2 times daily      magnesium oxide (MAG-OX) 400 MG tablet Take 400 mg by mouth daily      hydroCHLOROthiazide (MICROZIDE) 12.5 MG capsule Take 12.5 mg by mouth daily      albuterol (PROVENTIL) (2.5 MG/3ML) 0.083% nebulizer solution Take 2.5 mg by nebulization every 6 hours as needed for Wheezing      warfarin (COUMADIN) 4 MG tablet Take 1 tablet by mouth Once for 1 dose (Patient taking differently: Take 6 mg by mouth Once ) 30 tablet 3     No current facility-administered medications on file prior to visit. Review of Systems   Constitutional: Positive for fatigue. Negative for activity change, appetite change, chills and fever. HENT: Positive for congestion (chest and sinus). Negative for sore throat and trouble swallowing. Eyes: Negative. Respiratory: Positive for cough, shortness of breath and wheezing. Cardiovascular: Negative for chest pain, palpitations and leg swelling. APRN - CNP

## 2021-05-08 ENCOUNTER — HOSPITAL ENCOUNTER (EMERGENCY)
Age: 86
Discharge: HOME | End: 2021-05-08
Payer: MEDICARE

## 2021-05-08 ENCOUNTER — APPOINTMENT (OUTPATIENT)
Dept: GENERAL RADIOLOGY | Age: 86
End: 2021-05-08
Payer: MEDICARE

## 2021-05-08 VITALS
WEIGHT: 140 LBS | SYSTOLIC BLOOD PRESSURE: 158 MMHG | BODY MASS INDEX: 23.32 KG/M2 | HEIGHT: 65 IN | DIASTOLIC BLOOD PRESSURE: 92 MMHG | HEART RATE: 63 BPM | TEMPERATURE: 97.4 F | OXYGEN SATURATION: 97 % | RESPIRATION RATE: 16 BRPM

## 2021-05-08 DIAGNOSIS — S42.035A NONDISPLACED FRACTURE OF LATERAL END OF LEFT CLAVICLE, INITIAL ENCOUNTER FOR CLOSED FRACTURE: Primary | ICD-10-CM

## 2021-05-08 DIAGNOSIS — M25.422 EFFUSION OF LEFT ELBOW: ICD-10-CM

## 2021-05-08 DIAGNOSIS — S92.024A CLOSED NONDISPLACED FRACTURE OF ANTERIOR PROCESS OF RIGHT CALCANEUS, INITIAL ENCOUNTER: ICD-10-CM

## 2021-05-08 PROCEDURE — 6370000000 HC RX 637 (ALT 250 FOR IP): Performed by: NURSE PRACTITIONER

## 2021-05-08 PROCEDURE — 71046 X-RAY EXAM CHEST 2 VIEWS: CPT

## 2021-05-08 PROCEDURE — 73080 X-RAY EXAM OF ELBOW: CPT

## 2021-05-08 PROCEDURE — 73030 X-RAY EXAM OF SHOULDER: CPT

## 2021-05-08 PROCEDURE — 73610 X-RAY EXAM OF ANKLE: CPT

## 2021-05-08 PROCEDURE — 99284 EMERGENCY DEPT VISIT MOD MDM: CPT

## 2021-05-08 RX ORDER — TRAMADOL HYDROCHLORIDE 50 MG/1
50 TABLET ORAL EVERY 6 HOURS PRN
Qty: 12 TABLET | Refills: 0 | Status: SHIPPED | OUTPATIENT
Start: 2021-05-08 | End: 2021-05-11

## 2021-05-08 RX ORDER — ACETAMINOPHEN 325 MG/1
650 TABLET ORAL ONCE
Status: COMPLETED | OUTPATIENT
Start: 2021-05-08 | End: 2021-05-08

## 2021-05-08 RX ADMIN — ACETAMINOPHEN 650 MG: 325 TABLET ORAL at 09:50

## 2021-05-08 ASSESSMENT — ENCOUNTER SYMPTOMS
EYE ITCHING: 0
NAUSEA: 0
EYE REDNESS: 0
TROUBLE SWALLOWING: 0
CONSTIPATION: 0
COUGH: 0
VOMITING: 0
ABDOMINAL PAIN: 0
EYE PAIN: 0
CHEST TIGHTNESS: 0
ABDOMINAL DISTENTION: 0
BACK PAIN: 0
WHEEZING: 0
EYE DISCHARGE: 0
DIARRHEA: 0
COLOR CHANGE: 0
RHINORRHEA: 0
SORE THROAT: 0
SINUS PAIN: 0
SHORTNESS OF BREATH: 0
SINUS PRESSURE: 0
PHOTOPHOBIA: 0

## 2021-05-08 ASSESSMENT — PAIN DESCRIPTION - DESCRIPTORS: DESCRIPTORS: ACHING;TENDER

## 2021-05-08 ASSESSMENT — PAIN DESCRIPTION - LOCATION: LOCATION: ANKLE;SHOULDER

## 2021-05-08 NOTE — ED PROVIDER NOTES
[address] [facility] ED  EMERGENCY DEPARTMENT ENCOUNTER      Pt Name: Owen Potts  MRN: [id number]  Malougfrosangela 1935  Date of evaluation: 5/8/2021  Provider: RON Barillas CNP    CHIEF COMPLAINT       Chief Complaint   Patient presents with    Fall     pt c/o left shoulder and right ankle pain after falling yesterday, denies LOC, head and neck pain         HISTORY OF PRESENT ILLNESS   (Location/Symptom, Timing/Onset,Context/Setting, Quality, Duration, Modifying Factors, Severity)  Note limiting factors. Owen Potts is a 81 y.o. female who presents to the emergency department for plan of left shoulder and right ankle pain due to a short fall yesterday. Patient states that she frequently wakes and has some stumbling and initial difficulty with ambulation until she is able to get to her cane in the mornings. She states this happens almost exclusively in the mornings when she is waking to go to the bathroom. She reports that for 1 year since her left hip replacement she is been having an issue where her foot will drag causing her to have stumbles. She states generally it is minor she is able to catch herself and continue. States however that yesterday morning 24 hours ago when she got out of bed her right ankle twisted causing her to fall from a semiseated position in the bed. She was able to catch her self of the left arm but then caused pain in her left shoulder. She states she did strike her left shoulder she did not fall completely to the ground did not injure her head face or neck. Her daughter lives with her is able to help her back up and she was been ambulatory since. She states she continues have pain that she rates as a 5 out of 10 in the right ankle that occurs when she is walking is a minor aching throbbing, she has been able to bear weight well otherwise.   She states she has no pain or discomfort anywhere when she is at rest but continues to have a 7 out of 10 aching in her left shoulder when she use her left arm. She states she has full range of motion of her left shoulder but it does cause more intense pain when using that arm. She denies any chest pain shortness of breath or difficulty breathing denies any rib pain denies any abdominal pain. Family is bedside and confirms that she has not had any dizziness complaints changes of activity coordination or behavior. Patient is on Coumadin. They both deny any facial deviation slurred speech changes of vision or activity or behavior. Nursing Notes were reviewed. REVIEW OF SYSTEMS    (2-9 systems for level 4, 10 or more for level 5)     Review of Systems   Constitutional: Negative for activity change, appetite change, chills, diaphoresis, fatigue and fever. HENT: Negative for congestion, ear pain, postnasal drip, rhinorrhea, sinus pressure, sinus pain, sore throat and trouble swallowing. Eyes: Negative for photophobia, pain, discharge, redness, itching and visual disturbance. Respiratory: Negative for cough, chest tightness, shortness of breath and wheezing. Cardiovascular: Negative for chest pain and palpitations. Gastrointestinal: Negative for abdominal distention, abdominal pain, constipation, diarrhea, nausea and vomiting. Genitourinary: Negative for difficulty urinating, dysuria, flank pain, frequency and urgency. Musculoskeletal: Positive for arthralgias, gait problem (pain in right ankle, foot drags since left hip surgery 1 year ago) and myalgias. Negative for back pain, joint swelling, neck pain and neck stiffness. Skin: Negative for color change, rash and wound. Neurological: Negative for dizziness, tremors, seizures, syncope, speech difficulty, weakness, light-headedness, numbness and headaches. Except as noted above the remainder of the review of systems was reviewed and negative.        PAST MEDICAL HISTORY     Past Medical History:   Diagnosis Date    Anticoagulant long-term use     Atrial fibrillation (HCC)     Cancer (HCC)     RUL lung    COPD (chronic obstructive pulmonary disease) (HCC)     Hypertension     Lupus (HCC)     Neuropathy     Osteoarthritis     Sleep apnea      Past Surgical History:   Procedure Laterality Date    APPENDECTOMY      CARDIAC PACEMAKER PLACEMENT      CHOLECYSTECTOMY      ELBOW SURGERY Left     FEMUR FRACTURE SURGERY Left 2020    [facility]     Socioeconomic History    Marital status:      Spouse name: None    Number of children: None    Years of education: None    Highest education level: None   Occupational History    None   Social Needs    Financial resource strain: Not hard at all   [employer] insecurity     Worry: Never true     Inability: Never true   Contractor Copilot needs     Medical: No     Non-medical: No   Tobacco Use    Smoking status: Former Smoker     Types: Cigarettes     Quit date:      Years since quittin.3    Smokeless tobacco: Never Used   Substance and Sexual Activity    Alcohol use: Not Currently    Drug use: Never    Sexual activity: Not Currently     Partners: Male   Lifestyle    Physical activity     Days per week: 0 days     Minutes per session: 0 min    Stress: Only a little   Relationships    Social connections     Talks on phone: More than three times a week     Gets together: Once a week     Attends Jewish service: 1 to 4 times per year     Active member of club or organization: No     Attends meetings of clubs or organizations: Never     Relationship status:      Intimate partner violence     Fear of current or ex partner: No     Emotionally abused: No     Physically abused: No     Forced sexual activity: No   Other Topics Concern    None   Social History Narrative         Lives With: Family    Type of Home: [facility] [place] [address] [place]: Two level, Bed/Bath upstairs    Bathroom Shower/Tub: Tub/Shower unit    Bathroom Equipment: Shower chair, Grab bars in shower    Home Equipment: 4 wheeled walker    ADL Assistance: [address]: [facility] Responsibilities: Yes    Laundry Responsibility: Primary    Cleaning Responsibility: (Pt completes dishes and light homemaking and completes her own laundry management)    Ambulation Assistance: Independent    Transfer Assistance: Independent    Additional Comments: wears 02 at night       SCREENINGS    Bj Coma Scale  Eye Opening: Spontaneous  Best Verbal Response: Oriented  Best Motor Response: Obeys commands  [place] Coma Scale Score: 15        PHYSICAL EXAM    (up to 7 for level 4, 8 or more for level 5)     ED Triage Vitals [05/08/21 0926]   BP Temp Temp Source Pulse Resp SpO2 Height Weight   (!) 158/92 97.4 °F (36.3 °C) Oral 63 16 97 % 5' 5\" (1.651 m) 140 lb (63.5 kg)       Physical Exam  Constitutional:       General: She is not in acute distress. Appearance: Normal appearance. She is normal weight. She is not ill-appearing, toxic-appearing or diaphoretic. HENT:      Head: Normocephalic and atraumatic. Right Ear: External ear normal.      Left Ear: External ear normal.      Nose: Nose normal. No congestion or rhinorrhea. Mouth/Throat:      Mouth: Mucous membranes are moist.      Pharynx: Oropharynx is clear. No oropharyngeal exudate or posterior oropharyngeal erythema. Eyes:      General:         Right eye: No discharge. Left eye: No discharge. Extraocular Movements: Extraocular movements intact. Conjunctiva/sclera: Conjunctivae normal.      Pupils: Pupils are equal, round, and reactive to light. Neck:      Musculoskeletal: Normal range of motion and neck supple. No neck rigidity or muscular tenderness. Cardiovascular:      Rate and Rhythm: Normal rate and regular rhythm. Pulses: Normal pulses.    Pulmonary:      Effort: Pulmonary effort is normal.      Breath sounds: Normal breath sounds. Chest:      Chest wall: No tenderness. Abdominal:      General: Bowel sounds are normal. There is no distension. Palpations: There is no mass. Tenderness: There is no abdominal tenderness. There is no guarding or rebound. Hernia: No hernia is present. Musculoskeletal: Normal range of motion. General: Tenderness present. No swelling, deformity or signs of injury. Right shoulder: Normal.      Left shoulder: She exhibits tenderness, pain and spasm. She exhibits normal range of motion, no bony tenderness, no swelling, no effusion, no crepitus, no laceration, normal pulse and normal strength. Right elbow: Normal.     Left elbow: She exhibits normal range of motion, no swelling, no effusion, no deformity and no laceration. Tenderness found. Right wrist: Normal.      Left wrist: Normal.      Right hip: Normal.      Left hip: Normal.      Right knee: Normal.      Left knee: Normal.      Right ankle: She exhibits normal range of motion, no swelling, no ecchymosis, no deformity, no laceration and normal pulse. Tenderness. No lateral malleolus and no medial malleolus tenderness found. Achilles tendon exhibits pain. Left ankle: Normal.      Cervical back: Normal.      Thoracic back: Normal.      Lumbar back: Normal.      Right upper arm: Normal.      Left upper arm: Normal.      Right forearm: Normal.      Left forearm: Normal.        Arms:       Right hand: Normal.      Left hand: Normal.      Right upper leg: Normal.      Left upper leg: Normal.      Right lower leg: Normal.      Left lower leg: Normal.      Right foot: Normal range of motion and normal capillary refill. Tenderness present. No bony tenderness, swelling, crepitus or deformity. Left foot: Normal.        Feet:    Skin:     General: Skin is warm and dry. Capillary Refill: Capillary refill takes less than 2 seconds.    Neurological:      General: No focal deficit present. Mental Status: She is alert and oriented to person, place, and time. Mental status is at baseline. Cranial Nerves: No cranial nerve deficit. Sensory: No sensory deficit. Motor: No weakness. Coordination: Coordination normal.         RESULTS     EKG: All EKG's are interpreted by the Emergency Department Physician who either signs or Co-signsthis chart in the absence of a cardiologist.        RADIOLOGY:   Caresse Cools such as CT, Ultrasound and MRI are read by the radiologist. Plain radiographic images are visualized and preliminarily interpreted by the emergency physician with the below findings:        Interpretation per the Radiologist below, if available at the time ofthis note:    XR ELBOW LEFT (MIN 3 VIEWS)   Final Result      Acute mildly displaced fracture involving the distal clavicle at the acromioclavicular joint. Possible left small elbow joint effusion. Postsurgical changes at the elbow. The elbow joint effusion is nonspecific. Elbow joint effusions can be associated with occult fractures, or this may relate to the postsurgical changes or degenerative changes. Possible nondisplaced involving the posterior aspect of the calcaneus. Recommend correlation for point tenderness. Nodular opacities within the right upper lung zone and left lung base correlating with the hypermetabolic lesions on the recent PET CT, and better evaluated on that study. No acute radiographic cardiopulmonary process or pneumothorax. XR SHOULDER LEFT (MIN 2 VIEWS)   Final Result      Acute mildly displaced fracture involving the distal clavicle at the acromioclavicular joint. Possible left small elbow joint effusion. Postsurgical changes at the elbow. The elbow joint effusion is nonspecific. Elbow joint effusions can be associated with occult fractures, or this may relate to the postsurgical changes or degenerative changes.       Possible nondisplaced involving the posterior aspect of the calcaneus. Recommend correlation for point tenderness. Nodular opacities within the right upper lung zone and left lung base correlating with the hypermetabolic lesions on the recent PET CT, and better evaluated on that study. No acute radiographic cardiopulmonary process or pneumothorax. XR ANKLE RIGHT (MIN 3 VIEWS)   Final Result      Acute mildly displaced fracture involving the distal clavicle at the acromioclavicular joint. Possible left small elbow joint effusion. Postsurgical changes at the elbow. The elbow joint effusion is nonspecific. Elbow joint effusions can be associated with occult fractures, or this may relate to the postsurgical changes or degenerative changes. Possible nondisplaced involving the posterior aspect of the calcaneus. Recommend correlation for point tenderness. Nodular opacities within the right upper lung zone and left lung base correlating with the hypermetabolic lesions on the recent PET CT, and better evaluated on that study. No acute radiographic cardiopulmonary process or pneumothorax. XR CHEST (2 VW)   Final Result      Acute mildly displaced fracture involving the distal clavicle at the acromioclavicular joint. Possible left small elbow joint effusion. Postsurgical changes at the elbow. The elbow joint effusion is nonspecific. Elbow joint effusions can be associated with occult fractures, or this may relate to the postsurgical changes or degenerative changes. Possible nondisplaced involving the posterior aspect of the calcaneus. Recommend correlation for point tenderness. Nodular opacities within the right upper lung zone and left lung base correlating with the hypermetabolic lesions on the recent PET CT, and better evaluated on that study. No acute radiographic cardiopulmonary process or pneumothorax.             ED BEDSIDE ULTRASOUND:   Performed by ED Physician - none    LABS:  Labs Reviewed - No data to walking cane. The cane is usable right side. States he does not use her left arm for this. She will be provided with a left shoulder immobilizer to support both the clavicle and the left elbow. They state they will be able to follow-up with orthopedics on Monday morning to feel safe being discharged home due to the multiple assistive devices and support at home. Patient be discharged with medication for pain discomfort the form of tramadol as she has used this before safely in the past and preferred more of a mild pain medication. She is encouraged to additionally use Tylenol as needed in between with rest ice and elevation of the extremities as applicable. Family at bedside states that she feels comfortable taking care of her at home as she has been assisting her for the past year since her fall and is friendly with the use of all the supportive devices. Directed return to the ER for any onset of new concerning symptoms worsening condition. Specifically return for any changes of behavior pain of the head no matter how minor odd headaches changes in vision dizziness disorientation as this will require a CT evaluation of the head as well as any intolerable pain of the joints that are injured. Bed rest is much as possible do not walk frequently on the right foot until it is further evaluated by orthopedics. Patient and  verbalized understanding of all given instructions and education. CRITICAL CARE TIME       CONSULTS:  None    PROCEDURES:  Unless otherwise noted below, none     Procedures    FINAL IMPRESSION      1. Nondisplaced fracture of lateral end of left clavicle, initial encounter for closed fracture    2. Closed nondisplaced fracture of anterior process of right calcaneus, initial encounter    3.  Effusion of left elbow          DISPOSITION/PLAN   DISPOSITION        PATIENT REFERRED TO:  [facility], [place] [address] [address] [facility]  [phone]  Schedule an appointment as soon as possible for a visit on 5/10/2021      Vivi Rodrigues, [address]  [phone]    Call in 3 days        DISCHARGE MEDICATIONS:  New Prescriptions    TRAMADOL (ULTRAM) 50 MG TABLET    Take 1 tablet by mouth every 6 hours as needed for Pain for up to 3 days. Intended supply: 3 days.  Take lowest dose possible to manage pain          (Please notethat portions of this note were completed with a voice recognition program.  Efforts were made to edit the dictations but occasionally words are mis-transcribed.)    RNO Coelho CNP (electronically signed)  Attending Emergency Physician         RON Coelho CNP  05/08/21 2803

## 2021-05-08 NOTE — ED NOTES
Discharge instructions explained to patient with verbalization of understanding.       Cleopatra Jameson RN  05/08/21 0919

## 2021-05-10 ENCOUNTER — OFFICE VISIT (OUTPATIENT)
Dept: PULMONOLOGY | Age: 86
End: 2021-05-10
Payer: MEDICARE

## 2021-05-10 VITALS
OXYGEN SATURATION: 98 % | RESPIRATION RATE: 16 BRPM | HEART RATE: 66 BPM | TEMPERATURE: 97.1 F | HEIGHT: 66 IN | DIASTOLIC BLOOD PRESSURE: 60 MMHG | SYSTOLIC BLOOD PRESSURE: 110 MMHG | BODY MASS INDEX: 22.6 KG/M2

## 2021-05-10 DIAGNOSIS — J96.11 CHRONIC RESPIRATORY FAILURE WITH HYPOXIA (HCC): ICD-10-CM

## 2021-05-10 DIAGNOSIS — J44.9 CHRONIC OBSTRUCTIVE PULMONARY DISEASE, UNSPECIFIED COPD TYPE (HCC): ICD-10-CM

## 2021-05-10 DIAGNOSIS — G47.33 OBSTRUCTIVE SLEEP APNEA (ADULT) (PEDIATRIC): ICD-10-CM

## 2021-05-10 DIAGNOSIS — I27.20 PULMONARY HYPERTENSION (HCC): ICD-10-CM

## 2021-05-10 DIAGNOSIS — R91.1 LUNG NODULE: Primary | ICD-10-CM

## 2021-05-10 PROCEDURE — G8926 SPIRO NO PERF OR DOC: HCPCS | Performed by: INTERNAL MEDICINE

## 2021-05-10 PROCEDURE — G8400 PT W/DXA NO RESULTS DOC: HCPCS | Performed by: INTERNAL MEDICINE

## 2021-05-10 PROCEDURE — 3023F SPIROM DOC REV: CPT | Performed by: INTERNAL MEDICINE

## 2021-05-10 PROCEDURE — 99213 OFFICE O/P EST LOW 20 MIN: CPT | Performed by: INTERNAL MEDICINE

## 2021-05-10 PROCEDURE — 4040F PNEUMOC VAC/ADMIN/RCVD: CPT | Performed by: INTERNAL MEDICINE

## 2021-05-10 PROCEDURE — G8420 CALC BMI NORM PARAMETERS: HCPCS | Performed by: INTERNAL MEDICINE

## 2021-05-10 PROCEDURE — 1090F PRES/ABSN URINE INCON ASSESS: CPT | Performed by: INTERNAL MEDICINE

## 2021-05-10 PROCEDURE — 1036F TOBACCO NON-USER: CPT | Performed by: INTERNAL MEDICINE

## 2021-05-10 PROCEDURE — G8427 DOCREV CUR MEDS BY ELIG CLIN: HCPCS | Performed by: INTERNAL MEDICINE

## 2021-05-10 PROCEDURE — 1123F ACP DISCUSS/DSCN MKR DOCD: CPT | Performed by: INTERNAL MEDICINE

## 2021-05-10 RX ORDER — AMOXICILLIN 500 MG/1
TABLET, FILM COATED ORAL
COMMUNITY
Start: 2021-05-04

## 2021-05-10 RX ORDER — PREDNISONE 10 MG/1
TABLET ORAL
COMMUNITY
Start: 2021-05-04

## 2021-05-10 RX ORDER — IPRATROPIUM BROMIDE AND ALBUTEROL SULFATE 2.5; .5 MG/3ML; MG/3ML
SOLUTION RESPIRATORY (INHALATION)
COMMUNITY
Start: 2021-05-04

## 2021-05-10 NOTE — PROGRESS NOTES
Subjective:     Em Alcala is a 81 y.o. female who complains today of:     Chief Complaint   Patient presents with    Follow-up     1 Week F/U for Nodule of lower lobe of left lung        HPI  Patient presents for lung nodule, patient was seen by interventional radiology, unable to do the nodule due to its location close to the diaphragm. She has shortness of breath which is at baseline, mainly exertional, no coughing, no chest pain, no fever, she had a fall recently fractured her left clavicle and right ankle currently she follows up with orthopedic surgery, she recently received a course of prednisone and currently on doxycycline for COPD exacerbation. Weight is stable, no nausea no vomiting. No worsening lower extremity edema. Allergies:  Atorvastatin and Lipitor [atorvastatin calcium]  Past Medical History:   Diagnosis Date    Anticoagulant long-term use     Atrial fibrillation (HCC)     Cancer (HCC)     RUL lung    COPD (chronic obstructive pulmonary disease) (HCC)     Hypertension     Lupus ([address]     Neuropathy     Osteoarthritis     Sleep apnea      Past Surgical History:   Procedure Laterality Date    APPENDECTOMY      CARDIAC PACEMAKER PLACEMENT      CHOLECYSTECTOMY      ELBOW SURGERY Left     FEMUR FRACTURE SURGERY Left 5/27/2020    INTRAMEDULLARY NAIL LEFT FEMUR performed by Luis Fernando Kim MD at [facility]     Family History   Problem Relation Age of Onset    Cancer Sister      Social History     Socioeconomic History    Marital status:       Spouse name: Not on file    Number of children: Not on file    Years of education: Not on file    Highest education level: Not on file   Occupational History    Not on file   Social Needs    Financial resource strain: Not hard at all    Food insecurity     Worry: Never true     Inability: Never true   [employer] needs     Medical: No     Non-medical: No   Tobacco Use    Smoking status: Former Smoker Types: Cigarettes     Quit date: 37     Years since quittin.3    Smokeless tobacco: Never Used   Substance and Sexual Activity    Alcohol use: Not Currently    Drug use: Never    Sexual activity: Not Currently     Partners: Male   Lifestyle    Physical activity     Days per week: 0 days     Minutes per session: 0 min    Stress: Only a little   Relationships    Social connections     Talks on phone: More than three times a week     Gets together: Once a week     Attends [employer] service: 1 to 4 times per year     Active member of club or organization: No     Attends meetings of clubs or organizations: Never     Relationship status:     Intimate partner violence     Fear of current or ex partner: No     Emotionally abused: No     Physically abused: No     Forced sexual activity: No   Other Topics Concern    Not on file   Social History Narrative         Lives With: Family    Type of Home: [id number] [facility] [address] [id number] [place]: Two level, Bed/Bath upstairs    Bathroom Shower/Tub: Tub/Shower unit    Bathroom Equipment: Shower chair, Grab bars in [address] [place]: 4 wheeled walker    ADL Assistance: Independent    Homemaking Assistance: Independent    Homemaking Responsibilities: Yes    Laundry Responsibility: Primary    Cleaning Responsibility: (Pt completes dishes and light homemaking and completes her own laundry management)    Ambulation Assistance: Independent    Transfer Assistance: Independent    Additional Comments: wears 02 at night         Review of Systems      ROS: 10 organs review of system is done including general, psychological, ENT, hematological, endocrine, respiratory, cardiovascular, gastrointestinal,musculoskeletal, neurological,  allergy and Immunology is done and is otherwise negative.     Current Outpatient Medications   Medication Sig Dispense Refill    predniSONE (DELTASONE) 10 MG tablet       Amoxicillin 500 MG TABS TAKE 1 TABLET BY MOUTH THREE TIMES DAILY      ipratropium-albuterol (DUONEB) 0.5-2.5 (3) MG/3ML SOLN nebulizer solution USE 1 VIAL VIA NEBULIZER FOUR TIMES DAILY      traMADol (ULTRAM) 50 MG tablet Take 1 tablet by mouth every 6 hours as needed for Pain for up to 3 days. Intended supply: 3 days. Take lowest dose possible to manage pain 12 tablet 0    hydroxychloroquine (PLAQUENIL) 200 MG tablet       dextromethorphan-guaiFENesin (MUCINEX DM)  MG per extended release tablet Take 1 tablet by mouth every 12 hours as needed      fluticasone (FLONASE) 50 MCG/ACT nasal spray 1 spray by Each Nostril route daily      Cyanocobalamin (VITAMIN B-12) 1000 MCG extended release tablet Take 1,000 mcg by mouth daily      ferrous sulfate (IRON 325) 325 (65 Fe) MG tablet Take 325 mg by mouth daily (with breakfast)      Omega-3 Fatty Acids (FISH OIL) 1000 MG CAPS Take 3,000 mg by mouth 3 times daily      nitroGLYCERIN (NITROSTAT) 0.4 MG SL tablet Place 0.4 mg under the tongue every 5 minutes as needed for Chest pain up to max of 3 total doses. If no relief after 1 dose, call 911.       furosemide (LASIX) 20 MG tablet Take 1 tablet by mouth daily 60 tablet 3    pantoprazole (PROTONIX) 20 MG tablet Take 1 tablet by mouth every morning (before breakfast) 30 tablet 3    lisinopril (PRINIVIL;ZESTRIL) 40 MG tablet Take 1 tablet by mouth nightly 30 tablet 3    tiotropium (SPIRIVA RESPIMAT) 2.5 MCG/ACT AERS inhaler Inhale 2 puffs into the lungs daily 1 Inhaler 1    montelukast (SINGULAIR) 10 MG tablet Take 1 tablet by mouth nightly 30 tablet 3    sotalol (BETAPACE) 120 MG tablet Take 120 mg by mouth 2 times daily      aspirin 81 MG chewable tablet Take 81 mg by mouth daily      potassium chloride (KLOR-CON M) 20 MEQ extended release tablet Take 20 mEq by mouth 2 times daily      magnesium oxide (MAG-OX) 400 MG tablet Take 400 mg by mouth daily      hydroCHLOROthiazide (MICROZIDE) 12.5 MG capsule Take 12.5 mg by mouth daily      albuterol (PROVENTIL) (2.5 MG/3ML) 0.083% nebulizer solution Take 2.5 mg by nebulization every 6 hours as needed for Wheezing      warfarin (COUMADIN) 4 MG tablet Take 1 tablet by mouth Once for 1 dose (Patient taking differently: Take 6 mg by mouth Once ) 30 tablet 3     No current facility-administered medications for this visit. Objective:     Vitals:    05/10/21 1318   BP: 110/60   Site: Right Upper Arm   Position: Sitting   Cuff Size: Medium Adult   Pulse: 66   Resp: 16   Temp: 97.1 °F (36.2 °C)   TempSrc: Tympanic   SpO2: 98%   Height: 5' 6\" (1.676 m)         Physical Exam  Constitutional:       General: She is not in acute distress. Appearance: She is well-developed. She is not diaphoretic. HENT:      Head: Normocephalic and atraumatic. Eyes:      Conjunctiva/sclera: Conjunctivae normal.      Pupils: Pupils are equal, round, and reactive to light. Neck:      Musculoskeletal: Normal range of motion and neck supple. Cardiovascular:      Rate and Rhythm: Normal rate and regular rhythm. Heart sounds: No murmur. No friction rub. No gallop. Pulmonary:      Effort: Pulmonary effort is normal. No respiratory distress. Breath sounds: Normal breath sounds. No wheezing or rales. Chest:      Chest wall: No tenderness. Abdominal:      General: There is no distension. Palpations: Abdomen is soft. Tenderness: There is no abdominal tenderness. There is no rebound. Musculoskeletal:         General: No tenderness. Right lower leg: No edema. Left lower leg: No edema. Lymphadenopathy:      Cervical: No cervical adenopathy. Skin:     General: Skin is warm and dry. Findings: No erythema. Neurological:      Mental Status: She is alert and oriented to person, place, and time.    Psychiatric:         Judgment: Judgment normal.         Imaging studies reviewed by me CT chest shows PET positive inferior lingula nodule, with chronic changes right upper lobe  Lab results reviewed in chart  PFT COPD, records from [facility] indicate FEV1 36%  ECHO: May 2020, EF 60% RVSP 36  Obstructive sleep apnea CPAP 9    Assessment and Plan       Diagnosis Orders   1. Lung nodule     2. Obstructive sleep apnea (adult) (pediatric)     3. Chronic respiratory failure with hypoxia (HCC)     4. Chronic obstructive pulmonary disease, unspecified COPD type ([facility] [address]     5. Pulmonary hypertension (HCC)       · Lung nodule, inferior lingula PET positive, not accessible via CT-guided biopsy, at this location difficult access via navigational bronchoscopy, will await repeat CT scan on May 24 I will see her after that and if nodule persisted we will attempt navigational bronchoscopy for sampling. · Obstructive sleep apnea, continue CPAP 9 cmH2O  · Severe COPD, continue current inhalers continue O2 target sat 88 to 90%, patient follows up with Dr. Adria Gonzalez  · Pulmonary hypertension, group 3 and treatment as above. No orders of the defined types were placed in this encounter. No orders of the defined types were placed in this encounter. Discussed with patient the importance of exercise and weight control and  overall health and well-being. Reviewed with the patient: current clinical status, medications, activities and diet. Side effects, adverse effects of the medication prescribed today, as well as treatment plan and result expectations have been discussed with the patient who expresses understanding and desires to proceed. Return in about 7 weeks (around 6/28/2021).       Florencio Pineda MD

## 2021-05-13 ENCOUNTER — HOSPITAL ENCOUNTER (OUTPATIENT)
Dept: CT IMAGING | Age: 86
Discharge: HOME | End: 2021-05-15
Payer: MEDICARE

## 2021-05-13 DIAGNOSIS — R91.1 NODULE OF LOWER LOBE OF LEFT LUNG: ICD-10-CM

## 2021-05-13 PROCEDURE — 71250 CT THORAX DX C-: CPT

## 2021-05-21 ENCOUNTER — TELEPHONE (OUTPATIENT)
Dept: PULMONOLOGY | Age: 86
End: 2021-05-21

## 2021-05-21 DIAGNOSIS — R91.1 LUNG NODULE: Primary | ICD-10-CM

## 2021-05-21 NOTE — TELEPHONE ENCOUNTER
Spoke with the patient, reviewed CT scan with her, nodule left lower lobe remain relatively stable, still concerning for malignancy, option to proceed with navigational bronchoscopy versus monitoring. She prefer to wait and monitor for now, I will see her on July 6 we will review the CT together again and if she change her mind we will proceed with navigational bronc.  For now plan is to  repeat CT scan in August.

## 2021-08-09 ENCOUNTER — HOSPITAL ENCOUNTER (OUTPATIENT)
Dept: CT IMAGING | Age: 86
Discharge: HOME | End: 2021-08-11
Payer: MEDICARE

## 2021-08-09 VITALS
SYSTOLIC BLOOD PRESSURE: 156 MMHG | DIASTOLIC BLOOD PRESSURE: 68 MMHG | RESPIRATION RATE: 16 BRPM | HEART RATE: 61 BPM | OXYGEN SATURATION: 100 %

## 2021-08-09 DIAGNOSIS — R91.1 LUNG NODULE: ICD-10-CM

## 2021-08-09 PROCEDURE — 71250 CT THORAX DX C-: CPT

## 2021-08-28 LAB
ALBUMIN: 3.3 G/DL (ref 3.4–5)
ALP BLD-CCNC: 70 U/L (ref 33–136)
ALT SERPL-CCNC: 13 U/L (ref 7–45)
ANION GAP SERPL CALCULATED.3IONS-SCNC: 10 MMOL/L (ref 10–20)
AST SERPL-CCNC: 32 U/L (ref 9–39)
BICARBONATE: 33 MMOL/L (ref 21–32)
BILIRUB SERPL-MCNC: 0.6 MG/DL (ref 0–1.2)
CALCIUM SERPL-MCNC: 8.4 MG/DL (ref 8.6–10.3)
CHLORIDE BLD-SCNC: 88 MMOL/L (ref 98–107)
CREAT SERPL-MCNC: 0.42 MG/DL (ref 0.5–1)
GFR AFRICAN AMERICAN: >60 ML/MIN/1.73M2
GFR NON-AFRICAN AMERICAN: >60 ML/MIN/1.73M2
GLUCOSE: 118 MG/DL (ref 74–99)
POTASSIUM SERPL-SCNC: 5 MMOL/L (ref 3.5–5.3)
SODIUM BLD-SCNC: 126 MMOL/L (ref 136–145)
TOTAL PROTEIN: 5.9 G/DL (ref 6.4–8.2)
UREA NITROGEN: 11 MG/DL (ref 6–23)

## 2021-08-29 LAB
ALBUMIN: 3.2 G/DL (ref 3.4–5)
ALP BLD-CCNC: 73 U/L (ref 33–136)
ALT SERPL-CCNC: 21 U/L (ref 7–45)
ANION GAP SERPL CALCULATED.3IONS-SCNC: 13 MMOL/L (ref 10–20)
AST SERPL-CCNC: 40 U/L (ref 9–39)
BICARBONATE: 32 MMOL/L (ref 21–32)
BILIRUB SERPL-MCNC: 0.6 MG/DL (ref 0–1.2)
CALCIUM SERPL-MCNC: 8.2 MG/DL (ref 8.6–10.3)
CHLORIDE BLD-SCNC: 88 MMOL/L (ref 98–107)
CREAT SERPL-MCNC: 0.48 MG/DL (ref 0.5–1)
GFR AFRICAN AMERICAN: >60 ML/MIN/1.73M2
GFR NON-AFRICAN AMERICAN: >60 ML/MIN/1.73M2
GLUCOSE: 138 MG/DL (ref 74–99)
POTASSIUM SERPL-SCNC: 5.7 MMOL/L (ref 3.5–5.3)
SODIUM BLD-SCNC: 127 MMOL/L (ref 136–145)
TOTAL PROTEIN: 5.4 G/DL (ref 6.4–8.2)
UREA NITROGEN: 15 MG/DL (ref 6–23)

## 2021-08-30 LAB
ALBUMIN: 3.6 G/DL (ref 3.4–5)
ALP BLD-CCNC: 83 U/L (ref 33–136)
ALT SERPL-CCNC: 46 U/L (ref 7–45)
ANION GAP SERPL CALCULATED.3IONS-SCNC: 15 MMOL/L (ref 10–20)
AST SERPL-CCNC: 66 U/L (ref 9–39)
BICARBONATE: 34 MMOL/L (ref 21–32)
BILIRUB SERPL-MCNC: 0.8 MG/DL (ref 0–1.2)
CALCIUM SERPL-MCNC: 9.2 MG/DL (ref 8.6–10.3)
CHLORIDE BLD-SCNC: 90 MMOL/L (ref 98–107)
CREAT SERPL-MCNC: 0.56 MG/DL (ref 0.5–1)
GFR AFRICAN AMERICAN: >60 ML/MIN/1.73M2
GFR NON-AFRICAN AMERICAN: >60 ML/MIN/1.73M2
GLUCOSE: 140 MG/DL (ref 74–99)
POTASSIUM SERPL-SCNC: 4.9 MMOL/L (ref 3.5–5.3)
SODIUM BLD-SCNC: 134 MMOL/L (ref 136–145)
TOTAL PROTEIN: 6.4 G/DL (ref 6.4–8.2)
UREA NITROGEN: 22 MG/DL (ref 6–23)

## 2021-09-14 ENCOUNTER — TELEPHONE (OUTPATIENT)
Dept: PULMONOLOGY | Age: 86
End: 2021-09-14

## 2021-09-14 NOTE — TELEPHONE ENCOUNTER
----- Message from Trevor Gongora MD sent at 8/12/2021  8:46 AM EDT -----  Regarding: Needs follow-up appointment  Needs follow-up appointment

## 2021-09-14 NOTE — TELEPHONE ENCOUNTER
Patient did not show up to her follow-up appointment on August 26, CT chest shows 2 new lung nodules concerning for malignancy, I spoke with the patient over the phone I discussed with her that she will need biopsy or at least PET scan she does not want this to be done at this time. Patient would like to see Dr. Colletta Stabs in follow-up      Si Sandifer, could you please schedule this patient to see Dr. Colletta Stabs as soon as possible.       Discussed with the patient, she will be discharged from my care and transferred to Dr. Colletta Stabs

## 2021-09-15 NOTE — TELEPHONE ENCOUNTER
Somebody answered the phone for me yesterday and I talked to that person at 248 pm phone #[phone], even have the phone number recorded as outgoing phone call on my phone

## 2021-09-15 NOTE — TELEPHONE ENCOUNTER
I got through on that phone number today, the patients daughter answered and stated her mom passed away last week.
